# Patient Record
Sex: FEMALE | Race: WHITE | NOT HISPANIC OR LATINO
[De-identification: names, ages, dates, MRNs, and addresses within clinical notes are randomized per-mention and may not be internally consistent; named-entity substitution may affect disease eponyms.]

---

## 2020-09-20 ENCOUNTER — TRANSCRIPTION ENCOUNTER (OUTPATIENT)
Age: 60
End: 2020-09-20

## 2021-02-08 ENCOUNTER — APPOINTMENT (OUTPATIENT)
Dept: CT IMAGING | Facility: IMAGING CENTER | Age: 61
End: 2021-02-08
Payer: COMMERCIAL

## 2021-02-08 ENCOUNTER — RESULT REVIEW (OUTPATIENT)
Age: 61
End: 2021-02-08

## 2021-02-08 ENCOUNTER — OUTPATIENT (OUTPATIENT)
Dept: OUTPATIENT SERVICES | Facility: HOSPITAL | Age: 61
LOS: 1 days | End: 2021-02-08
Payer: COMMERCIAL

## 2021-02-08 DIAGNOSIS — G56.01 CARPAL TUNNEL SYNDROME, RIGHT UPPER LIMB: Chronic | ICD-10-CM

## 2021-02-08 DIAGNOSIS — Z00.8 ENCOUNTER FOR OTHER GENERAL EXAMINATION: ICD-10-CM

## 2021-02-08 DIAGNOSIS — Z98.89 OTHER SPECIFIED POSTPROCEDURAL STATES: Chronic | ICD-10-CM

## 2021-02-08 PROCEDURE — 74177 CT ABD & PELVIS W/CONTRAST: CPT | Mod: 26

## 2021-02-08 PROCEDURE — 82565 ASSAY OF CREATININE: CPT

## 2021-02-08 PROCEDURE — 74177 CT ABD & PELVIS W/CONTRAST: CPT

## 2021-04-16 ENCOUNTER — APPOINTMENT (OUTPATIENT)
Dept: RHEUMATOLOGY | Facility: CLINIC | Age: 61
End: 2021-04-16
Payer: COMMERCIAL

## 2021-04-16 ENCOUNTER — LABORATORY RESULT (OUTPATIENT)
Age: 61
End: 2021-04-16

## 2021-04-16 VITALS
SYSTOLIC BLOOD PRESSURE: 109 MMHG | HEIGHT: 66 IN | TEMPERATURE: 96.7 F | RESPIRATION RATE: 16 BRPM | WEIGHT: 139 LBS | OXYGEN SATURATION: 99 % | DIASTOLIC BLOOD PRESSURE: 70 MMHG | BODY MASS INDEX: 22.34 KG/M2 | HEART RATE: 60 BPM

## 2021-04-16 PROCEDURE — 99244 OFF/OP CNSLTJ NEW/EST MOD 40: CPT

## 2021-04-16 PROCEDURE — 99072 ADDL SUPL MATRL&STAF TM PHE: CPT

## 2021-04-21 ENCOUNTER — NON-APPOINTMENT (OUTPATIENT)
Age: 61
End: 2021-04-21

## 2021-04-22 ENCOUNTER — NON-APPOINTMENT (OUTPATIENT)
Age: 61
End: 2021-04-22

## 2021-04-27 ENCOUNTER — APPOINTMENT (OUTPATIENT)
Dept: RHEUMATOLOGY | Facility: CLINIC | Age: 61
End: 2021-04-27
Payer: COMMERCIAL

## 2021-04-27 VITALS
WEIGHT: 142 LBS | HEART RATE: 96 BPM | HEIGHT: 66 IN | BODY MASS INDEX: 22.82 KG/M2 | SYSTOLIC BLOOD PRESSURE: 157 MMHG | DIASTOLIC BLOOD PRESSURE: 70 MMHG | OXYGEN SATURATION: 98 % | TEMPERATURE: 97.9 F

## 2021-04-27 DIAGNOSIS — R76.12 NONSPECIFIC REACTION TO CELL MEDIATED IMMUNITY MEASUREMENT OF GAMMA INTERFERON ANTIGEN RESPONSE W/OUT ACTIVE TUBERCULOSIS: ICD-10-CM

## 2021-04-27 LAB
ALBUMIN SERPL ELPH-MCNC: 4.7 G/DL
ALP BLD-CCNC: 91 U/L
ALT SERPL-CCNC: 16 U/L
ANA SER IF-ACNC: NEGATIVE
ANION GAP SERPL CALC-SCNC: 15 MMOL/L
APPEARANCE: CLEAR
AST SERPL-CCNC: 12 U/L
B2 GLYCOPROT1 AB SER QL: NEGATIVE
BACTERIA: NEGATIVE
BASOPHILS # BLD AUTO: 0.04 K/UL
BASOPHILS NFR BLD AUTO: 0.3 %
BILIRUB SERPL-MCNC: 0.2 MG/DL
BILIRUBIN URINE: NEGATIVE
BLOOD URINE: NEGATIVE
BUN SERPL-MCNC: 13 MG/DL
C3 SERPL-MCNC: 172 MG/DL
C4 SERPL-MCNC: 42 MG/DL
CALCIUM SERPL-MCNC: 10.2 MG/DL
CARDIOLIPIN AB SER IA-ACNC: POSITIVE
CCP AB SER IA-ACNC: <8 UNITS
CHLORIDE SERPL-SCNC: 103 MMOL/L
CO2 SERPL-SCNC: 25 MMOL/L
COLOR: COLORLESS
CONFIRM: 29.8 SEC
CREAT SERPL-MCNC: 0.54 MG/DL
CREAT SPEC-SCNC: 25 MG/DL
CREAT/PROT UR: 0.3 RATIO
CRP SERPL-MCNC: 10 MG/L
DRVVT IMM 1:2 NP PPP: NORMAL
DRVVT SCREEN TO CONFIRM RATIO: 1.01 RATIO
DSDNA AB SER-ACNC: <12 IU/ML
ENA RNP AB SER IA-ACNC: <0.2 AL
ENA SM AB SER IA-ACNC: <0.2 AL
EOSINOPHIL # BLD AUTO: 0.05 K/UL
EOSINOPHIL NFR BLD AUTO: 0.3 %
ERYTHROCYTE [SEDIMENTATION RATE] IN BLOOD BY WESTERGREN METHOD: 36 MM/HR
GLUCOSE QUALITATIVE U: NEGATIVE
GLUCOSE SERPL-MCNC: 99 MG/DL
HBV CORE IGG+IGM SER QL: NONREACTIVE
HBV SURFACE AG SER QL: NONREACTIVE
HCT VFR BLD CALC: 41.3 %
HGB BLD-MCNC: 12.2 G/DL
HYALINE CASTS: 0 /LPF
IGG SUBSET TOTAL IGG: 746 MG/DL
IGG1 SER-MCNC: 531 MG/DL
IGG2 SER-MCNC: 121 MG/DL
IGG3 SER-MCNC: 50 MG/DL
IGG4 SER-MCNC: 4 MG/DL
IL6 SERPL-MCNC: 4.4 PG/ML
IMM GRANULOCYTES NFR BLD AUTO: 0.5 %
KETONES URINE: NEGATIVE
LEUKOCYTE ESTERASE URINE: NEGATIVE
LYMPHOCYTES # BLD AUTO: 1.76 K/UL
LYMPHOCYTES NFR BLD AUTO: 11.9 %
M TB IFN-G BLD-IMP: POSITIVE
MAN DIFF?: NORMAL
MCHC RBC-ENTMCNC: 27.2 PG
MCHC RBC-ENTMCNC: 29.5 GM/DL
MCV RBC AUTO: 92.2 FL
MICROSCOPIC-UA: NORMAL
MONOCYTES # BLD AUTO: 0.59 K/UL
MONOCYTES NFR BLD AUTO: 4 %
MPO AB + PR3 PNL SER: NORMAL
NEUTROPHILS # BLD AUTO: 12.29 K/UL
NEUTROPHILS NFR BLD AUTO: 83 %
NITRITE URINE: NEGATIVE
PH URINE: 6.5
PLATELET # BLD AUTO: 382 K/UL
POTASSIUM SERPL-SCNC: 4.9 MMOL/L
PROT SERPL-MCNC: 6.9 G/DL
PROT UR-MCNC: 7 MG/DL
PROTEIN URINE: NORMAL
QUANTIFERON TB PLUS MITOGEN MINUS NIL: 1.79 IU/ML
QUANTIFERON TB PLUS NIL: 0.01 IU/ML
QUANTIFERON TB PLUS TB1 MINUS NIL: 0.4 IU/ML
QUANTIFERON TB PLUS TB2 MINUS NIL: 0.46 IU/ML
RBC # BLD: 4.48 M/UL
RBC # FLD: 19 %
RED BLOOD CELLS URINE: 0 /HPF
RF+CCP IGG SER-IMP: NEGATIVE
RHEUMATOID FACT SER QL: 10 IU/ML
SCREEN DRVVT: 40.5 SEC
SILICA CLOTTING TIME INTERPRETATION: NORMAL
SILICA CLOTTING TIME S/C: 1.02 RATIO
SODIUM SERPL-SCNC: 143 MMOL/L
SPECIFIC GRAVITY URINE: 1.01
SQUAMOUS EPITHELIAL CELLS: 0 /HPF
T PALLIDUM AB SER QL IA: NEGATIVE
UROBILINOGEN URINE: NORMAL
WBC # FLD AUTO: 14.8 K/UL
WHITE BLOOD CELLS URINE: 0 /HPF

## 2021-04-27 PROCEDURE — 99072 ADDL SUPL MATRL&STAF TM PHE: CPT

## 2021-04-27 PROCEDURE — 99214 OFFICE O/P EST MOD 30 MIN: CPT

## 2021-04-28 ENCOUNTER — TRANSCRIPTION ENCOUNTER (OUTPATIENT)
Age: 61
End: 2021-04-28

## 2021-05-03 ENCOUNTER — APPOINTMENT (OUTPATIENT)
Dept: INFECTIOUS DISEASE | Facility: CLINIC | Age: 61
End: 2021-05-03
Payer: COMMERCIAL

## 2021-05-03 ENCOUNTER — LABORATORY RESULT (OUTPATIENT)
Age: 61
End: 2021-05-03

## 2021-05-03 ENCOUNTER — APPOINTMENT (OUTPATIENT)
Dept: RADIOLOGY | Facility: CLINIC | Age: 61
End: 2021-05-03
Payer: COMMERCIAL

## 2021-05-03 ENCOUNTER — OUTPATIENT (OUTPATIENT)
Dept: OUTPATIENT SERVICES | Facility: HOSPITAL | Age: 61
LOS: 1 days | End: 2021-05-03
Payer: COMMERCIAL

## 2021-05-03 VITALS
WEIGHT: 142 LBS | TEMPERATURE: 98.2 F | HEART RATE: 104 BPM | SYSTOLIC BLOOD PRESSURE: 156 MMHG | BODY MASS INDEX: 22.82 KG/M2 | DIASTOLIC BLOOD PRESSURE: 102 MMHG | OXYGEN SATURATION: 100 % | HEIGHT: 66 IN

## 2021-05-03 DIAGNOSIS — R76.12 NONSPECIFIC REACTION TO CELL MEDIATED IMMUNITY MEASUREMENT OF GAMMA INTERFERON ANTIGEN RESPONSE WITHOUT ACTIVE TUBERCULOSIS: ICD-10-CM

## 2021-05-03 DIAGNOSIS — Z98.89 OTHER SPECIFIED POSTPROCEDURAL STATES: Chronic | ICD-10-CM

## 2021-05-03 DIAGNOSIS — Z82.49 FAMILY HISTORY OF ISCHEMIC HEART DISEASE AND OTHER DISEASES OF THE CIRCULATORY SYSTEM: ICD-10-CM

## 2021-05-03 DIAGNOSIS — G56.01 CARPAL TUNNEL SYNDROME, RIGHT UPPER LIMB: Chronic | ICD-10-CM

## 2021-05-03 DIAGNOSIS — Z80.7 FAMILY HISTORY OF OTHER MALIGNANT NEOPLASMS OF LYMPHOID, HEMATOPOIETIC AND RELATED TISSUES: ICD-10-CM

## 2021-05-03 PROCEDURE — 99072 ADDL SUPL MATRL&STAF TM PHE: CPT

## 2021-05-03 PROCEDURE — 71046 X-RAY EXAM CHEST 2 VIEWS: CPT | Mod: 26

## 2021-05-03 PROCEDURE — 99203 OFFICE O/P NEW LOW 30 MIN: CPT

## 2021-05-03 PROCEDURE — 71046 X-RAY EXAM CHEST 2 VIEWS: CPT

## 2021-05-04 LAB
HCV AB SER QL: REACTIVE
HCV S/CO RATIO: 15.32 S/CO
HIV1+2 AB SPEC QL IA.RAPID: NONREACTIVE

## 2021-05-04 NOTE — PHYSICAL EXAM
[General Appearance - Alert] : alert [General Appearance - In No Acute Distress] : in no acute distress [Sclera] : the sclera and conjunctiva were normal [PERRL With Normal Accommodation] : pupils were equal in size, round, reactive to light [Extraocular Movements] : extraocular movements were intact [Outer Ear] : the ears and nose were normal in appearance [Oropharynx] : the oropharynx was normal with no thrush [Neck Appearance] : the appearance of the neck was normal [Neck Cervical Mass (___cm)] : no neck mass was observed [Jugular Venous Distention Increased] : there was no jugular-venous distention [] : no respiratory distress [Auscultation Breath Sounds / Voice Sounds] : lungs were clear to auscultation bilaterally [Heart Rate And Rhythm] : heart rate was normal and rhythm regular [Bowel Sounds] : normal bowel sounds [Abdomen Soft] : soft [Cervical Lymph Nodes Enlarged Posterior Bilaterally] : posterior cervical [Cervical Lymph Nodes Enlarged Anterior Bilaterally] : anterior cervical [Supraclavicular Lymph Nodes Enlarged Bilaterally] : supraclavicular [Skin Lesions] : no skin lesions [No Focal Deficits] : no focal deficits [Oriented To Time, Place, And Person] : oriented to person, place, and time [Affect] : the affect was normal

## 2021-05-04 NOTE — ASSESSMENT
[FreeTextEntry1] : This is a 59 yo F with h/o aortitis, MGUS, prior Hep C (s/p treatment >6 years ago ~2015 and cured), presents today for positive quantiferon gold.\par \par Check chest xray.\par If negative then it is c/w latent TB and  will start  mg po daily and B6 mg po daily.\par \par Check HIV, hepatitis C since h/o Hep c in past. \par LFTs normal. \par \par Discussed side effects of INH with pt including but not limited to GI upset, neuropathy, elevated LFTs, bone marrow suppression.  She does not drink alcohol.\par \par Pt to return one month.\par \par Addendum:\par Chest xray clear.\par Pt informed to start INH as above.

## 2021-05-04 NOTE — HISTORY OF PRESENT ILLNESS
[FreeTextEntry1] : This is a 59 yo F with h/o aortitis, MGUS, prior Hep C (s/p treatment >6 years ago ~2015 and cured), presents today for positive quantiferon gold.\par \par Born in [place].\par Moved to  1999.\par Never knew of a positive TB test in the past.\par Never treated in the past.\par No known exposures.\par Works as .\par Lives in [place]\par \par \par No cough, sob, enlarged lymph nodes, fevers. Does get some sweats since post menopausal. No hemoptysis.\par \par Had chest xray 1/28/21 which was clear. \par \par Also notes h/o Hep C, s/p treatment ~2015, LFTs normal now. \par \par Now on Prednisone 60 mg daily and to be tapered down soon to 50.  To start biologic once TB treatment initiated.

## 2021-05-04 NOTE — CONSULT LETTER
[Dear  ___] : Dear  [unfilled], [Consult Letter:] : I had the pleasure of evaluating your patient, [unfilled]. [Please see my note below.] : Please see my note below. [Consult Closing:] : Thank you very much for allowing me to participate in the care of this patient.  If you have any questions, please do not hesitate to contact me. [Sincerely,] : Sincerely, [FreeTextEntry2] : Dr. Neva Patino [FreeTextEntry3] : \par Valery Thibodeaux MD\par  of Medicine\par Division of Infectious Diseases\par [facility]\par [address] [place] [address] DrJyoti\[place] [address]\par Tel: [phone]\par Fax: [phone]

## 2021-05-05 LAB
HCV RNA SERPL NAA DL=5-ACNC: NOT DETECTED IU/ML
HCV RNA SERPL NAA+PROBE-LOG IU: NOT DETECTED LOG10IU/ML

## 2021-05-10 ENCOUNTER — APPOINTMENT (OUTPATIENT)
Dept: CT IMAGING | Facility: CLINIC | Age: 61
End: 2021-05-10
Payer: COMMERCIAL

## 2021-05-10 ENCOUNTER — OUTPATIENT (OUTPATIENT)
Dept: OUTPATIENT SERVICES | Facility: HOSPITAL | Age: 61
LOS: 1 days | End: 2021-05-10
Payer: COMMERCIAL

## 2021-05-10 DIAGNOSIS — Z98.89 OTHER SPECIFIED POSTPROCEDURAL STATES: Chronic | ICD-10-CM

## 2021-05-10 DIAGNOSIS — G56.01 CARPAL TUNNEL SYNDROME, RIGHT UPPER LIMB: Chronic | ICD-10-CM

## 2021-05-10 DIAGNOSIS — Z00.8 ENCOUNTER FOR OTHER GENERAL EXAMINATION: ICD-10-CM

## 2021-05-10 DIAGNOSIS — I77.6 ARTERITIS, UNSPECIFIED: ICD-10-CM

## 2021-05-10 PROCEDURE — 71275 CT ANGIOGRAPHY CHEST: CPT | Mod: 26

## 2021-05-10 PROCEDURE — 70498 CT ANGIOGRAPHY NECK: CPT | Mod: 26

## 2021-05-10 PROCEDURE — 70498 CT ANGIOGRAPHY NECK: CPT

## 2021-05-10 PROCEDURE — 71275 CT ANGIOGRAPHY CHEST: CPT

## 2021-05-11 ENCOUNTER — NON-APPOINTMENT (OUTPATIENT)
Age: 61
End: 2021-05-11

## 2021-05-27 ENCOUNTER — APPOINTMENT (OUTPATIENT)
Dept: RHEUMATOLOGY | Facility: CLINIC | Age: 61
End: 2021-05-27
Payer: COMMERCIAL

## 2021-05-27 VITALS
BODY MASS INDEX: 23.3 KG/M2 | OXYGEN SATURATION: 98 % | SYSTOLIC BLOOD PRESSURE: 186 MMHG | HEIGHT: 66 IN | TEMPERATURE: 97.2 F | WEIGHT: 145 LBS | DIASTOLIC BLOOD PRESSURE: 102 MMHG | HEART RATE: 115 BPM

## 2021-05-27 DIAGNOSIS — Z22.7 LATENT TUBERCULOSIS: ICD-10-CM

## 2021-05-27 PROCEDURE — 99072 ADDL SUPL MATRL&STAF TM PHE: CPT

## 2021-05-27 PROCEDURE — 99214 OFFICE O/P EST MOD 30 MIN: CPT

## 2021-05-28 ENCOUNTER — TRANSCRIPTION ENCOUNTER (OUTPATIENT)
Age: 61
End: 2021-05-28

## 2021-05-28 LAB
ALBUMIN SERPL ELPH-MCNC: 4.8 G/DL
ALP BLD-CCNC: 73 U/L
ALT SERPL-CCNC: 22 U/L
ANION GAP SERPL CALC-SCNC: 15 MMOL/L
AST SERPL-CCNC: 16 U/L
BASOPHILS # BLD AUTO: 0.02 K/UL
BASOPHILS NFR BLD AUTO: 0.2 %
BILIRUB SERPL-MCNC: 0.3 MG/DL
BUN SERPL-MCNC: 12 MG/DL
CALCIUM SERPL-MCNC: 9.8 MG/DL
CHLORIDE SERPL-SCNC: 102 MMOL/L
CO2 SERPL-SCNC: 26 MMOL/L
CREAT SERPL-MCNC: 0.58 MG/DL
CRP SERPL-MCNC: 29 MG/L
EOSINOPHIL # BLD AUTO: 0.01 K/UL
EOSINOPHIL NFR BLD AUTO: 0.1 %
ERYTHROCYTE [SEDIMENTATION RATE] IN BLOOD BY WESTERGREN METHOD: 27 MM/HR
GLUCOSE SERPL-MCNC: 138 MG/DL
HCT VFR BLD CALC: 42.4 %
HGB BLD-MCNC: 13.1 G/DL
IMM GRANULOCYTES NFR BLD AUTO: 0.4 %
LYMPHOCYTES # BLD AUTO: 0.67 K/UL
LYMPHOCYTES NFR BLD AUTO: 6.4 %
MAN DIFF?: NORMAL
MCHC RBC-ENTMCNC: 29.3 PG
MCHC RBC-ENTMCNC: 30.9 GM/DL
MCV RBC AUTO: 94.9 FL
MONOCYTES # BLD AUTO: 0.15 K/UL
MONOCYTES NFR BLD AUTO: 1.4 %
NEUTROPHILS # BLD AUTO: 9.57 K/UL
NEUTROPHILS NFR BLD AUTO: 91.5 %
PLATELET # BLD AUTO: 334 K/UL
POTASSIUM SERPL-SCNC: 4.7 MMOL/L
PROT SERPL-MCNC: 7 G/DL
RBC # BLD: 4.47 M/UL
RBC # FLD: 17.5 %
SODIUM SERPL-SCNC: 143 MMOL/L
WBC # FLD AUTO: 10.46 K/UL

## 2021-06-08 ENCOUNTER — APPOINTMENT (OUTPATIENT)
Dept: INFECTIOUS DISEASE | Facility: CLINIC | Age: 61
End: 2021-06-08
Payer: COMMERCIAL

## 2021-06-08 VITALS
HEART RATE: 91 BPM | OXYGEN SATURATION: 97 % | WEIGHT: 144 LBS | RESPIRATION RATE: 17 BRPM | SYSTOLIC BLOOD PRESSURE: 135 MMHG | BODY MASS INDEX: 23.14 KG/M2 | DIASTOLIC BLOOD PRESSURE: 85 MMHG | TEMPERATURE: 99.1 F | HEIGHT: 66 IN

## 2021-06-08 PROCEDURE — 99072 ADDL SUPL MATRL&STAF TM PHE: CPT

## 2021-06-08 PROCEDURE — 99213 OFFICE O/P EST LOW 20 MIN: CPT

## 2021-06-11 LAB
ALBUMIN SERPL ELPH-MCNC: 4.4 G/DL
ALP BLD-CCNC: 62 U/L
ALT SERPL-CCNC: 33 U/L
ANION GAP SERPL CALC-SCNC: 15 MMOL/L
AST SERPL-CCNC: 18 U/L
BILIRUB SERPL-MCNC: 0.3 MG/DL
BUN SERPL-MCNC: 11 MG/DL
CALCIUM SERPL-MCNC: 9.3 MG/DL
CHLORIDE SERPL-SCNC: 102 MMOL/L
CO2 SERPL-SCNC: 23 MMOL/L
CREAT SERPL-MCNC: 0.6 MG/DL
GLUCOSE SERPL-MCNC: 138 MG/DL
POTASSIUM SERPL-SCNC: 4.8 MMOL/L
PROT SERPL-MCNC: 6.5 G/DL
SODIUM SERPL-SCNC: 139 MMOL/L

## 2021-06-14 NOTE — ASSESSMENT
[FreeTextEntry1] : This is a 61 yo F with h/o aortitis, MGUS, prior Hep C (s/p treatment >6 years ago ~2015 and cured), presents today for positive quantiferon gold found to have Latent TB\par \par Chest xray was clear 5/4/21 as was CT angio 5/20/21. No findings of TB.\par \par Continue  mg po daily and B6 mg po daily.  Planned for 9 months.  End date 2/5/2022.\par \par LFTs normal.\par \par Discussed side effects of INH with pt including but not limited to GI upset, neuropathy, elevated LFTs, bone marrow suppression.  She does not drink alcohol.\par \par If needed, can start biologic as pt has been on INH for one month now. \par \par Pt to return one month.\par \par

## 2021-06-14 NOTE — PHYSICAL EXAM
[General Appearance - Alert] : alert [General Appearance - In No Acute Distress] : in no acute distress [PERRL With Normal Accommodation] : pupils were equal in size, round, reactive to light [Sclera] : the sclera and conjunctiva were normal [Extraocular Movements] : extraocular movements were intact [Outer Ear] : the ears and nose were normal in appearance [Neck Appearance] : the appearance of the neck was normal [Oropharynx] : the oropharynx was normal with no thrush [Neck Cervical Mass (___cm)] : no neck mass was observed [Jugular Venous Distention Increased] : there was no jugular-venous distention [Auscultation Breath Sounds / Voice Sounds] : lungs were clear to auscultation bilaterally [] : no respiratory distress [Heart Rate And Rhythm] : heart rate was normal and rhythm regular [Bowel Sounds] : normal bowel sounds [Cervical Lymph Nodes Enlarged Posterior Bilaterally] : posterior cervical [Abdomen Soft] : soft [Cervical Lymph Nodes Enlarged Anterior Bilaterally] : anterior cervical [Supraclavicular Lymph Nodes Enlarged Bilaterally] : supraclavicular [Skin Lesions] : no skin lesions [No Focal Deficits] : no focal deficits [Oriented To Time, Place, And Person] : oriented to person, place, and time [Affect] : the affect was normal

## 2021-06-14 NOTE — HISTORY OF PRESENT ILLNESS
[FreeTextEntry1] : This is a 59 yo F with h/o aortitis, MGUS, prior Hep C (s/p treatment >6 years ago ~2015 and cured), presents today for positive quantiferon gold.\par \par Born in [place].\par Moved to  1999.\par Never knew of a positive TB test in the past.\par Never treated in the past.\par No known exposures.\par Works as .\par Lives in [place]\par \par \par No cough, sob, enlarged lymph nodes, fevers. Does get some sweats since post menopausal. No hemoptysis.\par \par Had chest xray 1/28/21 which was clear. \par \par Also notes h/o Hep C, s/p treatment ~2015, LFTs normal now. \par \par Now on Prednisone 60 mg daily and to be tapered down soon to 50.  To start biologic once TB treatment initiated.   \par \par Started INH and B6 5/2021.  Tolerating. Does have some have some muscle cramps. Told by her cardiologist to take magnesium.

## 2021-06-14 NOTE — CONSULT LETTER
[Dear  ___] : Dear  [unfilled], [Consult Letter:] : I had the pleasure of evaluating your patient, [unfilled]. [Consult Closing:] : Thank you very much for allowing me to participate in the care of this patient.  If you have any questions, please do not hesitate to contact me. [Please see my note below.] : Please see my note below. [Sincerely,] : Sincerely, [FreeTextEntry2] : Dr. Neva Patino [FreeTextEntry3] : \par Valery Thibodeaux MD\par  of Medicine\par Division of Infectious Diseases\par [facility] at [facility]\par [address] [place] [address] DrJyoti\[place] [address]\par Tel: [phone]\par Fax: [phone]

## 2021-07-01 ENCOUNTER — APPOINTMENT (OUTPATIENT)
Dept: RHEUMATOLOGY | Facility: CLINIC | Age: 61
End: 2021-07-01
Payer: COMMERCIAL

## 2021-07-01 VITALS
HEIGHT: 66 IN | BODY MASS INDEX: 23.14 KG/M2 | SYSTOLIC BLOOD PRESSURE: 122 MMHG | OXYGEN SATURATION: 98 % | DIASTOLIC BLOOD PRESSURE: 78 MMHG | WEIGHT: 144 LBS | HEART RATE: 88 BPM | TEMPERATURE: 97.8 F | RESPIRATION RATE: 16 BRPM

## 2021-07-01 PROCEDURE — 99214 OFFICE O/P EST MOD 30 MIN: CPT

## 2021-07-01 PROCEDURE — 99072 ADDL SUPL MATRL&STAF TM PHE: CPT

## 2021-07-02 ENCOUNTER — TRANSCRIPTION ENCOUNTER (OUTPATIENT)
Age: 61
End: 2021-07-02

## 2021-07-02 LAB
BASOPHILS # BLD AUTO: 0.03 K/UL
BASOPHILS NFR BLD AUTO: 0.3 %
CRP SERPL-MCNC: 8 MG/L
EOSINOPHIL # BLD AUTO: 0.01 K/UL
EOSINOPHIL NFR BLD AUTO: 0.1 %
ERYTHROCYTE [SEDIMENTATION RATE] IN BLOOD BY WESTERGREN METHOD: 19 MM/HR
HCT VFR BLD CALC: 42.8 %
HGB BLD-MCNC: 13 G/DL
IMM GRANULOCYTES NFR BLD AUTO: 0.5 %
LYMPHOCYTES # BLD AUTO: 0.85 K/UL
LYMPHOCYTES NFR BLD AUTO: 7.3 %
MAN DIFF?: NORMAL
MCHC RBC-ENTMCNC: 29.7 PG
MCHC RBC-ENTMCNC: 30.4 GM/DL
MCV RBC AUTO: 97.9 FL
MONOCYTES # BLD AUTO: 0.27 K/UL
MONOCYTES NFR BLD AUTO: 2.3 %
NEUTROPHILS # BLD AUTO: 10.48 K/UL
NEUTROPHILS NFR BLD AUTO: 89.5 %
PLATELET # BLD AUTO: 318 K/UL
RBC # BLD: 4.37 M/UL
RBC # FLD: 15.2 %
WBC # FLD AUTO: 11.7 K/UL

## 2021-07-16 ENCOUNTER — NON-APPOINTMENT (OUTPATIENT)
Age: 61
End: 2021-07-16

## 2021-07-16 ENCOUNTER — APPOINTMENT (OUTPATIENT)
Dept: INFECTIOUS DISEASE | Facility: CLINIC | Age: 61
End: 2021-07-16
Payer: COMMERCIAL

## 2021-07-16 VITALS
OXYGEN SATURATION: 100 % | TEMPERATURE: 98.5 F | HEIGHT: 66 IN | BODY MASS INDEX: 23.14 KG/M2 | WEIGHT: 144 LBS | HEART RATE: 124 BPM | SYSTOLIC BLOOD PRESSURE: 156 MMHG | DIASTOLIC BLOOD PRESSURE: 106 MMHG

## 2021-07-16 PROCEDURE — 99213 OFFICE O/P EST LOW 20 MIN: CPT

## 2021-07-16 PROCEDURE — 99072 ADDL SUPL MATRL&STAF TM PHE: CPT

## 2021-07-16 NOTE — CONSULT LETTER
[Dear  ___] : Dear  [unfilled], [Consult Letter:] : I had the pleasure of evaluating your patient, [unfilled]. [Please see my note below.] : Please see my note below. [Consult Closing:] : Thank you very much for allowing me to participate in the care of this patient.  If you have any questions, please do not hesitate to contact me. [Sincerely,] : Sincerely, [FreeTextEntry2] : Dr. Neva Patino [FreeTextEntry3] : \par Valery Thibodeaux MD\par  of Medicine\par Division of Infectious Diseases\par [facility] at [facility]\par [address] [place] [address] DrJyoti\[place] [address]\par Tel: [phone]\par Fax: [phone]

## 2021-07-16 NOTE — ASSESSMENT
[FreeTextEntry1] : This is a 60 yo F with h/o aortitis, MGUS, prior Hep C (s/p treatment >6 years ago ~2015 and cured), presents today for positive quantiferon gold found to have Latent TB\par \par Chest xray was clear 5/4/21 as was CT angio 5/20/21. No findings of TB.\par \par Continue  mg po daily and B6 mg po daily.  Planned for 9 months.  End date 2/5/2022.\par \par LFTs normal.\par \par Discussed side effects of INH with pt including but not limited to GI upset, neuropathy, elevated LFTs, bone marrow suppression.  She does not drink alcohol.\par \par If needed, can start biologic as pt has been on INH for one month now. \par \par Concerned regarding leg cramps.  Will add CPK to labs today with CMP. CBC checked 2 weeks ago and stable.  WBC mildly high but pt on prednisone.   \par \par Asked pt to hold INH x 2 days. Continue B6 though.  Will see if cramps any better with holding 2 doses of INH.\par Resume INH Sunday 7/18/21.\par \par RTC 6-8 weeks but sooner if needed. \par \par

## 2021-07-19 LAB
ALBUMIN SERPL ELPH-MCNC: 4.4 G/DL
ALP BLD-CCNC: 73 U/L
ALT SERPL-CCNC: 114 U/L
ANION GAP SERPL CALC-SCNC: 15 MMOL/L
AST SERPL-CCNC: 56 U/L
BILIRUB SERPL-MCNC: 0.2 MG/DL
BUN SERPL-MCNC: 15 MG/DL
CALCIUM SERPL-MCNC: 9.5 MG/DL
CHLORIDE SERPL-SCNC: 104 MMOL/L
CK SERPL-CCNC: 134 U/L
CO2 SERPL-SCNC: 24 MMOL/L
CREAT SERPL-MCNC: 0.67 MG/DL
CRP SERPL-MCNC: 8 MG/L
ERYTHROCYTE [SEDIMENTATION RATE] IN BLOOD BY WESTERGREN METHOD: 9 MM/HR
GLUCOSE SERPL-MCNC: 117 MG/DL
POTASSIUM SERPL-SCNC: 4.6 MMOL/L
PROT SERPL-MCNC: 6.3 G/DL
SODIUM SERPL-SCNC: 143 MMOL/L

## 2021-07-20 ENCOUNTER — TRANSCRIPTION ENCOUNTER (OUTPATIENT)
Age: 61
End: 2021-07-20

## 2021-07-21 ENCOUNTER — TRANSCRIPTION ENCOUNTER (OUTPATIENT)
Age: 61
End: 2021-07-21

## 2021-07-21 ENCOUNTER — APPOINTMENT (OUTPATIENT)
Dept: INFECTIOUS DISEASE | Facility: CLINIC | Age: 61
End: 2021-07-21

## 2021-07-21 ENCOUNTER — NON-APPOINTMENT (OUTPATIENT)
Age: 61
End: 2021-07-21

## 2021-07-21 LAB
ALBUMIN SERPL ELPH-MCNC: 4.5 G/DL
ALP BLD-CCNC: 71 U/L
ALT SERPL-CCNC: 83 U/L
ANION GAP SERPL CALC-SCNC: 16 MMOL/L
AST SERPL-CCNC: 35 U/L
BILIRUB SERPL-MCNC: 0.4 MG/DL
BUN SERPL-MCNC: 16 MG/DL
CALCIUM SERPL-MCNC: 9.9 MG/DL
CHLORIDE SERPL-SCNC: 100 MMOL/L
CO2 SERPL-SCNC: 24 MMOL/L
CREAT SERPL-MCNC: 0.78 MG/DL
GLUCOSE SERPL-MCNC: 106 MG/DL
POTASSIUM SERPL-SCNC: 4.6 MMOL/L
PROT SERPL-MCNC: 6.2 G/DL
SODIUM SERPL-SCNC: 140 MMOL/L

## 2021-07-22 ENCOUNTER — TRANSCRIPTION ENCOUNTER (OUTPATIENT)
Age: 61
End: 2021-07-22

## 2021-07-28 ENCOUNTER — APPOINTMENT (OUTPATIENT)
Dept: RHEUMATOLOGY | Facility: CLINIC | Age: 61
End: 2021-07-28
Payer: COMMERCIAL

## 2021-07-28 VITALS
TEMPERATURE: 97.2 F | HEART RATE: 103 BPM | BODY MASS INDEX: 23.33 KG/M2 | OXYGEN SATURATION: 98 % | DIASTOLIC BLOOD PRESSURE: 79 MMHG | RESPIRATION RATE: 16 BRPM | HEIGHT: 66 IN | WEIGHT: 145.2 LBS | SYSTOLIC BLOOD PRESSURE: 129 MMHG

## 2021-07-28 DIAGNOSIS — R06.02 SHORTNESS OF BREATH: ICD-10-CM

## 2021-07-28 DIAGNOSIS — K21.9 GASTRO-ESOPHAGEAL REFLUX DISEASE W/OUT ESOPHAGITIS: ICD-10-CM

## 2021-07-28 PROCEDURE — 99072 ADDL SUPL MATRL&STAF TM PHE: CPT

## 2021-07-28 PROCEDURE — 99215 OFFICE O/P EST HI 40 MIN: CPT

## 2021-08-20 ENCOUNTER — APPOINTMENT (OUTPATIENT)
Dept: MRI IMAGING | Facility: CLINIC | Age: 61
End: 2021-08-20
Payer: COMMERCIAL

## 2021-08-20 ENCOUNTER — OUTPATIENT (OUTPATIENT)
Dept: OUTPATIENT SERVICES | Facility: HOSPITAL | Age: 61
LOS: 1 days | End: 2021-08-20
Payer: COMMERCIAL

## 2021-08-20 ENCOUNTER — APPOINTMENT (OUTPATIENT)
Dept: RADIOLOGY | Facility: CLINIC | Age: 61
End: 2021-08-20
Payer: COMMERCIAL

## 2021-08-20 DIAGNOSIS — Z98.89 OTHER SPECIFIED POSTPROCEDURAL STATES: Chronic | ICD-10-CM

## 2021-08-20 DIAGNOSIS — Z00.8 ENCOUNTER FOR OTHER GENERAL EXAMINATION: ICD-10-CM

## 2021-08-20 DIAGNOSIS — G56.01 CARPAL TUNNEL SYNDROME, RIGHT UPPER LIMB: Chronic | ICD-10-CM

## 2021-08-20 PROCEDURE — 74185 MRA ABD W OR W/O CNTRST: CPT | Mod: 26

## 2021-08-20 PROCEDURE — 77085 DXA BONE DENSITY AXL VRT FX: CPT

## 2021-08-20 PROCEDURE — 71555 MRI ANGIO CHEST W OR W/O DYE: CPT | Mod: 26

## 2021-08-20 PROCEDURE — C8911: CPT

## 2021-08-20 PROCEDURE — 77085 DXA BONE DENSITY AXL VRT FX: CPT | Mod: 26

## 2021-08-20 PROCEDURE — C8902: CPT

## 2021-08-20 PROCEDURE — A9585: CPT

## 2021-08-26 ENCOUNTER — APPOINTMENT (OUTPATIENT)
Dept: MRI IMAGING | Facility: CLINIC | Age: 61
End: 2021-08-26

## 2021-08-27 ENCOUNTER — APPOINTMENT (OUTPATIENT)
Dept: PULMONOLOGY | Facility: CLINIC | Age: 61
End: 2021-08-27

## 2021-08-27 ENCOUNTER — RESULT REVIEW (OUTPATIENT)
Age: 61
End: 2021-08-27

## 2021-08-27 ENCOUNTER — APPOINTMENT (OUTPATIENT)
Dept: RADIOLOGY | Facility: CLINIC | Age: 61
End: 2021-08-27
Payer: COMMERCIAL

## 2021-08-27 ENCOUNTER — APPOINTMENT (OUTPATIENT)
Dept: RHEUMATOLOGY | Facility: CLINIC | Age: 61
End: 2021-08-27
Payer: COMMERCIAL

## 2021-08-27 VITALS
RESPIRATION RATE: 16 BRPM | WEIGHT: 149 LBS | HEIGHT: 66 IN | TEMPERATURE: 96.3 F | OXYGEN SATURATION: 98 % | HEART RATE: 102 BPM | BODY MASS INDEX: 23.95 KG/M2

## 2021-08-27 DIAGNOSIS — Z79.52 LONG TERM (CURRENT) USE OF SYSTEMIC STEROIDS: ICD-10-CM

## 2021-08-27 DIAGNOSIS — M25.572 PAIN IN LEFT ANKLE AND JOINTS OF LEFT FOOT: ICD-10-CM

## 2021-08-27 DIAGNOSIS — M25.571 PAIN IN RIGHT ANKLE AND JOINTS OF RIGHT FOOT: ICD-10-CM

## 2021-08-27 DIAGNOSIS — Z11.59 ENCOUNTER FOR SCREENING FOR OTHER VIRAL DISEASES: ICD-10-CM

## 2021-08-27 PROCEDURE — 73610 X-RAY EXAM OF ANKLE: CPT | Mod: RT

## 2021-08-27 PROCEDURE — 99214 OFFICE O/P EST MOD 30 MIN: CPT

## 2021-08-27 RX ORDER — ISONIAZID 300 MG/1
300 TABLET ORAL
Qty: 90 | Refills: 2 | Status: DISCONTINUED | COMMUNITY
Start: 2021-05-03 | End: 2021-08-27

## 2021-08-31 ENCOUNTER — NON-APPOINTMENT (OUTPATIENT)
Age: 61
End: 2021-08-31

## 2021-08-31 LAB
ALBUMIN SERPL ELPH-MCNC: 4.4 G/DL
ALP BLD-CCNC: 57 U/L
ALT SERPL-CCNC: 18 U/L
ANION GAP SERPL CALC-SCNC: 14 MMOL/L
AST SERPL-CCNC: 19 U/L
BASOPHILS # BLD AUTO: 0.02 K/UL
BASOPHILS NFR BLD AUTO: 0.2 %
BILIRUB SERPL-MCNC: 0.3 MG/DL
BUN SERPL-MCNC: 9 MG/DL
CALCIUM SERPL-MCNC: 9.7 MG/DL
CHLORIDE SERPL-SCNC: 105 MMOL/L
CK SERPL-CCNC: 38 U/L
CO2 SERPL-SCNC: 26 MMOL/L
COVID-19 SPIKE DOMAIN ANTIBODY INTERPRETATION: POSITIVE
CREAT SERPL-MCNC: 0.64 MG/DL
CRP SERPL-MCNC: <3 MG/L
EOSINOPHIL # BLD AUTO: 0 K/UL
EOSINOPHIL NFR BLD AUTO: 0 %
ERYTHROCYTE [SEDIMENTATION RATE] IN BLOOD BY WESTERGREN METHOD: 7 MM/HR
GGT SERPL-CCNC: 25 U/L
GLUCOSE SERPL-MCNC: 98 MG/DL
HCT VFR BLD CALC: 42.1 %
HGB BLD-MCNC: 13.5 G/DL
IMM GRANULOCYTES NFR BLD AUTO: 0.3 %
LYMPHOCYTES # BLD AUTO: 1.6 K/UL
LYMPHOCYTES NFR BLD AUTO: 15.2 %
MAN DIFF?: NORMAL
MCHC RBC-ENTMCNC: 30.5 PG
MCHC RBC-ENTMCNC: 32.1 GM/DL
MCV RBC AUTO: 95.2 FL
MONOCYTES # BLD AUTO: 0.56 K/UL
MONOCYTES NFR BLD AUTO: 5.3 %
NEUTROPHILS # BLD AUTO: 8.29 K/UL
NEUTROPHILS NFR BLD AUTO: 79 %
PLATELET # BLD AUTO: 274 K/UL
POTASSIUM SERPL-SCNC: 4.6 MMOL/L
PROT SERPL-MCNC: 6.5 G/DL
RBC # BLD: 4.42 M/UL
RBC # FLD: 13.2 %
SARS-COV-2 AB SERPL IA-ACNC: 142 U/ML
SODIUM SERPL-SCNC: 145 MMOL/L
WBC # FLD AUTO: 10.5 K/UL

## 2021-09-01 ENCOUNTER — APPOINTMENT (OUTPATIENT)
Dept: INFECTIOUS DISEASE | Facility: CLINIC | Age: 61
End: 2021-09-01
Payer: COMMERCIAL

## 2021-09-01 ENCOUNTER — OUTPATIENT (OUTPATIENT)
Dept: OUTPATIENT SERVICES | Facility: HOSPITAL | Age: 61
LOS: 1 days | End: 2021-09-01
Payer: COMMERCIAL

## 2021-09-01 ENCOUNTER — APPOINTMENT (OUTPATIENT)
Dept: MRI IMAGING | Facility: CLINIC | Age: 61
End: 2021-09-01
Payer: COMMERCIAL

## 2021-09-01 VITALS
DIASTOLIC BLOOD PRESSURE: 75 MMHG | HEIGHT: 66 IN | BODY MASS INDEX: 24.27 KG/M2 | HEART RATE: 80 BPM | RESPIRATION RATE: 18 BRPM | WEIGHT: 151 LBS | OXYGEN SATURATION: 99 % | SYSTOLIC BLOOD PRESSURE: 136 MMHG | TEMPERATURE: 98.7 F

## 2021-09-01 DIAGNOSIS — G56.01 CARPAL TUNNEL SYNDROME, RIGHT UPPER LIMB: Chronic | ICD-10-CM

## 2021-09-01 DIAGNOSIS — Z98.89 OTHER SPECIFIED POSTPROCEDURAL STATES: Chronic | ICD-10-CM

## 2021-09-01 DIAGNOSIS — M19.079 PRIMARY OSTEOARTHRITIS, UNSPECIFIED ANKLE AND FOOT: ICD-10-CM

## 2021-09-01 PROCEDURE — 73721 MRI JNT OF LWR EXTRE W/O DYE: CPT

## 2021-09-01 PROCEDURE — 73721 MRI JNT OF LWR EXTRE W/O DYE: CPT | Mod: 26,RT

## 2021-09-01 PROCEDURE — 99214 OFFICE O/P EST MOD 30 MIN: CPT

## 2021-09-03 NOTE — CONSULT LETTER
[Dear  ___] : Dear  [unfilled], [Consult Letter:] : I had the pleasure of evaluating your patient, [unfilled]. [Please see my note below.] : Please see my note below. [Consult Closing:] : Thank you very much for allowing me to participate in the care of this patient.  If you have any questions, please do not hesitate to contact me. [Sincerely,] : Sincerely, [FreeTextEntry2] : Dr. Neva Patino [FreeTextEntry3] : \par Valery Thibodeaux MD\par  of Medicine\par Division of Infectious Diseases\par [facility] at [facility]\par [address] [place] [address] DrJyoti\[place] [address]\par Tel: [phone]\par Fax: [phone]

## 2021-09-03 NOTE — HISTORY OF PRESENT ILLNESS
[FreeTextEntry1] : This is a 60 yo F with h/o aortitis, MGUS, prior Hep C (s/p treatment >6 years ago ~2015 and cured), presents today for f/up of latent TB with positive quantiferon gold.\par \par Born in [place].\par Moved to [place].\par Never knew of a positive TB test in the past.\par Never treated in the past.\par No known exposures.\par Works as .\par Lives in [place]\par \par \par No cough, sob, enlarged lymph nodes, fevers. Does get some sweats since post menopausal. No hemoptysis.\par \par Had chest xray 1/28/21 which was clear. \par \par Also notes h/o Hep C, s/p treatment ~2015, LFTs normal now. \par \par Now on Prednisone taper.  Started On 30 mg for aortitis.  Now at 15 mg po daily.    \par \par Started INH and B6 5/3/2021.  Developed muscle aches and elevated LFTs.  Drug stopped 7/16/21.  Returns to discuss next steps.    \par \par MRI chest done 8/20/21: Patent central airways.  No nodules. No effusion. \par Given omeprazole for GERD.\par

## 2021-09-03 NOTE — ASSESSMENT
[FreeTextEntry1] : This is a 62 yo F with h/o aortitis, MGUS, prior Hep C (s/p treatment >6 years ago ~2015 and cured), presents today for positive quantiferon gold found to have Latent TB\par \par Chest xray was clear 5/4/21 as was CT angio 5/20/21. No findings of TB.\par Had MRI chest 8/20/21 w/o findings of TB as well.\par \par Had to stop INH due to transaminitis and muscle aches.\par \par Will start Rifampin 600 mg po daily x 4 months today.\par Can interact with omeprazole and atorvastatin. Will monitor closely. \par \par LFTs normal now.\par \par Discussed side effects of Rifampin with pt including but not limited to GI upset, elevated LFTs, bone marrow suppression, orange colored urine.  She does not drink alcohol.\par \par RTC 2-3 weeks for close f/up given side effects with prior INH. \par \par \par \par

## 2021-09-07 ENCOUNTER — TRANSCRIPTION ENCOUNTER (OUTPATIENT)
Age: 61
End: 2021-09-07

## 2021-09-08 ENCOUNTER — INPATIENT (INPATIENT)
Facility: HOSPITAL | Age: 61
LOS: 1 days | Discharge: HOME | DRG: 547 | End: 2021-09-10
Attending: GENERAL ACUTE CARE HOSPITAL | Admitting: GENERAL ACUTE CARE HOSPITAL
Payer: COMMERCIAL

## 2021-09-08 ENCOUNTER — APPOINTMENT (OUTPATIENT)
Dept: RHEUMATOLOGY | Facility: CLINIC | Age: 61
End: 2021-09-08
Payer: COMMERCIAL

## 2021-09-08 VITALS
SYSTOLIC BLOOD PRESSURE: 176 MMHG | DIASTOLIC BLOOD PRESSURE: 104 MMHG | HEIGHT: 66 IN | HEART RATE: 128 BPM | OXYGEN SATURATION: 97 % | BODY MASS INDEX: 23.78 KG/M2 | WEIGHT: 148 LBS | TEMPERATURE: 97.4 F

## 2021-09-08 VITALS
OXYGEN SATURATION: 98 % | HEART RATE: 93 BPM | DIASTOLIC BLOOD PRESSURE: 70 MMHG | TEMPERATURE: 98 F | RESPIRATION RATE: 18 BRPM | WEIGHT: 149.91 LBS | HEIGHT: 66 IN | SYSTOLIC BLOOD PRESSURE: 112 MMHG

## 2021-09-08 DIAGNOSIS — G56.01 CARPAL TUNNEL SYNDROME, RIGHT UPPER LIMB: Chronic | ICD-10-CM

## 2021-09-08 DIAGNOSIS — R07.9 CHEST PAIN, UNSPECIFIED: ICD-10-CM

## 2021-09-08 DIAGNOSIS — Z98.89 OTHER SPECIFIED POSTPROCEDURAL STATES: Chronic | ICD-10-CM

## 2021-09-08 LAB
ALBUMIN SERPL ELPH-MCNC: 4.3 G/DL — SIGNIFICANT CHANGE UP (ref 3.3–5)
ALP SERPL-CCNC: 65 U/L — SIGNIFICANT CHANGE UP (ref 40–120)
ALT FLD-CCNC: 18 U/L — SIGNIFICANT CHANGE UP (ref 10–45)
ANION GAP SERPL CALC-SCNC: 17 MMOL/L — SIGNIFICANT CHANGE UP (ref 5–17)
APTT BLD: 33.8 SEC — SIGNIFICANT CHANGE UP (ref 27.5–35.5)
AST SERPL-CCNC: 25 U/L — SIGNIFICANT CHANGE UP (ref 10–40)
BASOPHILS # BLD AUTO: 0.03 K/UL — SIGNIFICANT CHANGE UP (ref 0–0.2)
BASOPHILS NFR BLD AUTO: 0.3 % — SIGNIFICANT CHANGE UP (ref 0–2)
BILIRUB SERPL-MCNC: 0.6 MG/DL — SIGNIFICANT CHANGE UP (ref 0.2–1.2)
BUN SERPL-MCNC: 10 MG/DL — SIGNIFICANT CHANGE UP (ref 7–23)
CALCIUM SERPL-MCNC: 9.8 MG/DL — SIGNIFICANT CHANGE UP (ref 8.4–10.5)
CHLORIDE SERPL-SCNC: 104 MMOL/L — SIGNIFICANT CHANGE UP (ref 96–108)
CO2 SERPL-SCNC: 20 MMOL/L — LOW (ref 22–31)
CREAT SERPL-MCNC: 0.56 MG/DL — SIGNIFICANT CHANGE UP (ref 0.5–1.3)
EOSINOPHIL # BLD AUTO: 0.02 K/UL — SIGNIFICANT CHANGE UP (ref 0–0.5)
EOSINOPHIL NFR BLD AUTO: 0.2 % — SIGNIFICANT CHANGE UP (ref 0–6)
GLUCOSE SERPL-MCNC: 96 MG/DL — SIGNIFICANT CHANGE UP (ref 70–99)
HCT VFR BLD CALC: 43.5 % — SIGNIFICANT CHANGE UP (ref 34.5–45)
HGB BLD-MCNC: 14 G/DL — SIGNIFICANT CHANGE UP (ref 11.5–15.5)
IMM GRANULOCYTES NFR BLD AUTO: 0.2 % — SIGNIFICANT CHANGE UP (ref 0–1.5)
INR BLD: 1.04 RATIO — SIGNIFICANT CHANGE UP (ref 0.88–1.16)
LYMPHOCYTES # BLD AUTO: 1.82 K/UL — SIGNIFICANT CHANGE UP (ref 1–3.3)
LYMPHOCYTES # BLD AUTO: 17.3 % — SIGNIFICANT CHANGE UP (ref 13–44)
MCHC RBC-ENTMCNC: 29.5 PG — SIGNIFICANT CHANGE UP (ref 27–34)
MCHC RBC-ENTMCNC: 32.2 GM/DL — SIGNIFICANT CHANGE UP (ref 32–36)
MCV RBC AUTO: 91.6 FL — SIGNIFICANT CHANGE UP (ref 80–100)
MONOCYTES # BLD AUTO: 0.68 K/UL — SIGNIFICANT CHANGE UP (ref 0–0.9)
MONOCYTES NFR BLD AUTO: 6.5 % — SIGNIFICANT CHANGE UP (ref 2–14)
NEUTROPHILS # BLD AUTO: 7.96 K/UL — HIGH (ref 1.8–7.4)
NEUTROPHILS NFR BLD AUTO: 75.5 % — SIGNIFICANT CHANGE UP (ref 43–77)
NRBC # BLD: 0 /100 WBCS — SIGNIFICANT CHANGE UP (ref 0–0)
NT-PROBNP SERPL-SCNC: 1234 PG/ML — HIGH (ref 0–300)
PLATELET # BLD AUTO: 299 K/UL — SIGNIFICANT CHANGE UP (ref 150–400)
POTASSIUM SERPL-MCNC: 4.3 MMOL/L — SIGNIFICANT CHANGE UP (ref 3.5–5.3)
POTASSIUM SERPL-SCNC: 4.3 MMOL/L — SIGNIFICANT CHANGE UP (ref 3.5–5.3)
PROT SERPL-MCNC: 6.9 G/DL — SIGNIFICANT CHANGE UP (ref 6–8.3)
PROTHROM AB SERPL-ACNC: 12.4 SEC — SIGNIFICANT CHANGE UP (ref 10.6–13.6)
RBC # BLD: 4.75 M/UL — SIGNIFICANT CHANGE UP (ref 3.8–5.2)
RBC # FLD: 12.9 % — SIGNIFICANT CHANGE UP (ref 10.3–14.5)
SARS-COV-2 RNA SPEC QL NAA+PROBE: SIGNIFICANT CHANGE UP
SODIUM SERPL-SCNC: 142 MMOL/L — SIGNIFICANT CHANGE UP (ref 135–145)
TROPONIN T, HIGH SENSITIVITY RESULT: 13 NG/L — SIGNIFICANT CHANGE UP (ref 0–51)
TROPONIN T, HIGH SENSITIVITY RESULT: 16 NG/L — SIGNIFICANT CHANGE UP (ref 0–51)
WBC # BLD: 10.53 K/UL — HIGH (ref 3.8–10.5)
WBC # FLD AUTO: 10.53 K/UL — HIGH (ref 3.8–10.5)

## 2021-09-08 PROCEDURE — G1004: CPT

## 2021-09-08 PROCEDURE — 99215 OFFICE O/P EST HI 40 MIN: CPT

## 2021-09-08 PROCEDURE — 71275 CT ANGIOGRAPHY CHEST: CPT | Mod: 26,MG

## 2021-09-08 PROCEDURE — 99223 1ST HOSP IP/OBS HIGH 75: CPT

## 2021-09-08 PROCEDURE — 93010 ELECTROCARDIOGRAM REPORT: CPT

## 2021-09-08 PROCEDURE — 99285 EMERGENCY DEPT VISIT HI MDM: CPT

## 2021-09-08 RX ORDER — METOPROLOL TARTRATE 50 MG
12.5 TABLET ORAL DAILY
Refills: 0 | Status: DISCONTINUED | OUTPATIENT
Start: 2021-09-08 | End: 2021-09-10

## 2021-09-08 RX ORDER — ENOXAPARIN SODIUM 100 MG/ML
40 INJECTION SUBCUTANEOUS DAILY
Refills: 0 | Status: DISCONTINUED | OUTPATIENT
Start: 2021-09-08 | End: 2021-09-10

## 2021-09-08 RX ORDER — VALACYCLOVIR 500 MG/1
500 TABLET, FILM COATED ORAL DAILY
Refills: 0 | Status: DISCONTINUED | OUTPATIENT
Start: 2021-09-08 | End: 2021-09-10

## 2021-09-08 RX ORDER — ATORVASTATIN CALCIUM 80 MG/1
20 TABLET, FILM COATED ORAL AT BEDTIME
Refills: 0 | Status: DISCONTINUED | OUTPATIENT
Start: 2021-09-08 | End: 2021-09-10

## 2021-09-08 RX ORDER — ONDANSETRON 8 MG/1
4 TABLET, FILM COATED ORAL EVERY 8 HOURS
Refills: 0 | Status: DISCONTINUED | OUTPATIENT
Start: 2021-09-08 | End: 2021-09-10

## 2021-09-08 RX ORDER — PANTOPRAZOLE SODIUM 20 MG/1
40 TABLET, DELAYED RELEASE ORAL
Refills: 0 | Status: DISCONTINUED | OUTPATIENT
Start: 2021-09-08 | End: 2021-09-10

## 2021-09-08 RX ORDER — ACETAMINOPHEN 500 MG
650 TABLET ORAL EVERY 6 HOURS
Refills: 0 | Status: DISCONTINUED | OUTPATIENT
Start: 2021-09-08 | End: 2021-09-10

## 2021-09-08 RX ADMIN — Medication 30 MILLILITER(S): at 16:49

## 2021-09-08 NOTE — H&P ADULT - PROBLEM SELECTOR PLAN 3
CT showing possible fat necrosis in bilateral breast soft tissue with indeterminate nodular focus in the left breast soft tissue  - outpt dedicated breast imaging - US vs mammogram vs CT

## 2021-09-08 NOTE — H&P ADULT - PROBLEM SELECTOR PLAN 1
ddx includes ischemic pain vs aortitis  delta trop negative, EKG non ischemic   possibly 2/2 aortitis 2/2 recent reduction in steroids  CTA showing no dissection or aneurysm  - rheum consult in AM (Dr Mckeon, [place])  - cardiology consult in AM (Dr Best)  - check TTE  - monitor on tele   - possible further ischemic work-up pending cardiology eval ddx includes ischemic pain vs aortitis  delta trop negative, EKG w/o KENNEY, +STD in I/AVL with TWI in V4-V6, no prior EKGs to compare to   possibly 2/2 aortitis 2/2 recent reduction in steroids  CTA showing no dissection or aneurysm  - rheum consult in AM (Dr Mckeon, [place] [facility])  - cardiology consult in AM (Dr Best)  - check TTE  - monitor on tele   - possible further ischemic work-up pending cardiology eval

## 2021-09-08 NOTE — H&P ADULT - NSHPREVIEWOFSYSTEMS_GEN_ALL_CORE
REVIEW OF SYSTEMS:    CONSTITUTIONAL: No weakness, fevers, chills  EYES/ENT: No visual changes;  no throat pain   NECK: No pain or stiffness  RESPIRATORY: No cough, no shortness of breath  CARDIOVASCULAR: +chest pain no palpitations  GASTROINTESTINAL: no nausea, vomiting, no abdominal pain, no BRBPR  GENITOURINARY: no polyuria, no dysuria  NEUROLOGICAL: +numbness in R foot, no headaches, no confusion   MUSCULOSKELETAL: +back pain and RLE pain, no weakness   SKIN: No itching, burning, rashes, or lesions   PSYCH: no anxiety, depression  HEME: no gum bleeding, no bruising

## 2021-09-08 NOTE — ED PROVIDER NOTE - NS ED ATTENDING STATEMENT MOD
Per patient there are no changes in medication, dosage, sig or quantity.  The medication (s) are set up as pending and waiting for your approval.  The preferred pharmacy has been set up and verified.    CELEBREX 200 MG capsule 30 capsule 2 4/24/2017     Sig - Route: Take 1 capsule by mouth daily. - Oral         Attending with

## 2021-09-08 NOTE — ED PROVIDER NOTE - ATTENDING CONTRIBUTION TO CARE
I performed a history and physical exam of the patient and discussed their management with the resident and /or advanced care provider. I reviewed the resident and /or ACP's note and agree with the documented findings and plan of care. My medical decision making and observations are found above.  Lungs clear, awake alert, abd soft

## 2021-09-08 NOTE — H&P ADULT - PROBLEM SELECTOR PLAN 4
Detail Level: Zone
Detail Level: Generalized
Detail Level: Detailed
Detail Level: Simple
h/o aortitis with intermittent episodes of CP   rheumatology consult in AM   c/w prednisone 145mg daily for now

## 2021-09-08 NOTE — H&P ADULT - NSICDXPASTMEDICALHX_GEN_ALL_CORE_FT
PAST MEDICAL HISTORY:  Aortitis     GERD (gastroesophageal reflux disease)     HCV (hepatitis C virus)     TB lung, latent

## 2021-09-08 NOTE — ED ADULT NURSE NOTE - OBJECTIVE STATEMENT
61yF BIBEMS from outpatient MD office for complaints of chest pain and R leg pain. PMHx of . Pt reports x2 episodes of anterior wall chest pain described as stabbing, sudden onset, first episode lasted ~5min and second episode 61yF KAYCEE from outpatient MD office for complaints of chest pain and R leg pain. PMHx of . Pt reports x2 episodes of anterior wall chest pain described as stabbing, sudden onset, first episode lasted ~5min and second episode lasting ~30min. Pt took ASA and was given oxygen for first episodes because she was on a flight (pt works as a ) and pain resolved. Pain resolved on own for second episode. Denies N/V, SOB, dizziness/lightheadedness, syncope, fevers/chills, urinary symptoms. Pt saw outpatient MD today who referred her to hospital for further work up. Pt also reports t4mtiac of R leg pain, numbness and tingling. Pt AAOx4, VS as documented, pt placed on cardiac monitor (NSR noted at this time). Pt lungs clear BL. Pt has no increased WOB, labored respirations, or retractions. Pt abdomen soft and nontender to palpation. Pt has + pulses in UE and LE BL. Pt has no edema in LE BL. Bed locked in lowest position with appropriate side rails raised. Pt given call bell and explained call bell system. Pt aware of plan to await ED MD assessment. Pt has no other questions or concerns at this time.

## 2021-09-08 NOTE — H&P ADULT - HISTORY OF PRESENT ILLNESS
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, HCV, latent TB, GERD p/w CP. Pt states she has 2 episodes of CP in the past 3 days. First episode was while she was flying (she is a ), severe, 10/10, midsternal pain, radiating to back, no associated SOB, n/v, diaphoresis, lightheadedness. Lasted for 5 minutes before passing. She had another similar episodes yesterday, although less intense, which lasted 30 minutes. She does not know of any provoking factors, denies any stress or exertion when the episodes occur; passes on its own without intervention after some time. Pt states feels similar to pain she had when she was diagnosed with aortitis and has had similar episodes whenever her prednisone dose is reduced; she was reduced form 20mg to 15mg about 2 weeks ago. Saw Dr Mckeon today due to these episodes and told to come to ED for further evaluation.   Pt also c/o one month of RLE pain - pain is from her buttock down to her feet, associated with paresthesias in her feet, mostly locate don posterior aspect of leg; pain is constant but worse with movement; pt has hx pf sciatica many years ago and this feels similar, but pain has not been getting any better over past few weeks.  No urinary or bowel incontinence, no recent fall or trauma.   Denies fevers, chills, CP, SOB, abd pain, n/v, urinary symptoms.

## 2021-09-08 NOTE — ED ADULT NURSE NOTE - BREATHING, MLM
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 He otherwise feels at baseline and would prefer to hold off on taking study drug until he speaks with [facility] CF doctor, Dr. Flores and be observed off drug, rather then withdraw from the study.\par \par He feels at a new baseline and is exacerbating more frequently since last hospitalization in July 2018.\par Pulmonary:\par frequent history of hemoptysis in the past, maintained on daily vitamin K supplement. \par History of severe C. difficile colitis during hospitalization in July, 2018 after being treated with oral ciprofloxacin. \par Discontinue PO vanco. \par Resumed azithromycin TIW  \par \par FEV appears to be at baseline before start of Corbus trial, between 38% - 40% since at least October, 2018. . Sputum positive for Pseudomonas and fungus Candida blanki - and has been on noxafil (posaconazole) since 2014. \par Hx of bronchoscopy with + candida blanki and persistent positivity.\par did not tolerate voriconazole. \par Unable to tolerate inhaled antibiotics [place] - caused hemoptysis. Tobramycin TIS?- caused hemoptysis but definitely due to dry powder . PATIENT MAY BENEFIT FROM TRYING ANOTHER MAINTENANCE INHALED ANTIBIOTIC AS HE HAS EXACERBATED 4 TIMES THIS YEAR ALONE. \par colistin - rash\par \par Alternatives to consider for inhalation use inh ceftazidime, levaquin. \par \par No evidence of  AFB, resume Azithro TIW. \par Baseline FEV1 in 39-40% range. \par ACT - no HS. uses albuterol, and pulmozyme. Does aerobika with neb BID-TID. Good adherence.\par \par Chronic respiratory failure - was discharged with bilevel use with 1.5 L O2 since 2014 hospitalization with fungal pneumonia where he was in the ICU. Has been using bilevel for the past 5 years. Reviewed VBG with PCO in 55 back in 2014.\par DME - [facility].\par \par Chest x-ray 12/2018 - chronic features of bronchiectasis.\par \par ENT - sinus congestion from a URI, not affecting his breathing. \par Pt did not fill script for trial of Afrin x 3 days, and switch flonase to azelastine. \par Pt stayed on azelastine.\par chronic sinusitis and nasal polyposis. Last sinus surgery was 2013. Has multiple ENT physicians. One who removes frequent cerumen impaction. Another ENT who would be performing surgeries if needed.\par \par GI - history of meconium ileus, that led to a perforation, needing ostomy which was reversed. Chronic constipation on MiraLax daily.\par \par Endocrine- OGTT impaired in 2016 \par CFRD on OGTT results performed on 5/24/19- 121/232/216 - pt had appointment with diabetes education 7/12/19\par ask endo to move up appointment. 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 \par \par Research - [id number]. \par \par Health Maintenance\par -up to date with flu vaccine, needs PNA  \par -needs BD, script provided again\par -annual labs drawn up to date\par \par ADVANCED CARE PLANNING - d/w pt regarding intubation and lung transplant. Went over decision aid, risks/benefits of both procedures. Answered pts questions. \par \par Subject is here for Visit 6 of the [id number].\par Subject returned all used and unused medication. New drug dispensed however pt will remain off study drug until he notifies me of resuming. 
Spontaneous, unlabored and symmetrical

## 2021-09-08 NOTE — ED PROVIDER NOTE - CLINICAL SUMMARY MEDICAL DECISION MAKING FREE TEXT BOX
René: hx of aortitis now with 2-3 episodes of chest pain. not radiating to back. +concern for aneurism or heart ischemic disease. would get labs and CT of aorta. PE not as likely. Would admit for pain.

## 2021-09-08 NOTE — H&P ADULT - PROBLEM SELECTOR PLAN 2
back pain radiating down RLE with paresthesias x 1 month, likely sciatica   - will check CT lumbar spine   - pain control with tylenol and oxycodone for now

## 2021-09-08 NOTE — ED ADULT TRIAGE NOTE - CHIEF COMPLAINT QUOTE
went to PMD for leg pain. pt stated that she had CP yesterday, PMD sent her to ED for further eval. denies CP currently.

## 2021-09-08 NOTE — ED PROVIDER NOTE - OBJECTIVE STATEMENT
62 yo female with pmhx aortitis (on prednisone, recently decreased), HCV, latent TB on rifampin, GERD, presenting with 3-4 days of CP. She is a , during flight felt sudden midsternal CP, nonradiating, had been persistent since then (was given oxygen on plane with no improvement of sx). Pain improved mildly today, but persisted, saw Rheum today, who rec go to ED for further evaluation/admission. Had extensive workup in March 2021 in [place] with dx with aortitis.  Also endorses having RLE pain, has h/o sciatica s/p fall 12 years ago and has chronic pain in RLE.    Denies SOB, N/V, LOC, vision changes, weakness.

## 2021-09-08 NOTE — H&P ADULT - NSHPLABSRESULTS_GEN_ALL_CORE
Labs, imaging and EKG personally reviewed and interpreted by me.   labs notable for normal WBC, Hb, electrolytes; trop 30/32, elevated BNP  Imaging personally reviewed and interpreted by me - CXR w/o consolidation or effusions.   EKG personally reviewed and interpreted by me - NSR, no acute ischemic changes.                         14.0   10.53 )-----------( 299      ( 08 Sep 2021 16:48 )             43.5     09-08    142  |  104  |  10  ----------------------------<  96  4.3   |  20<L>  |  0.56    Ca    9.8      08 Sep 2021 16:48    TPro  6.9  /  Alb  4.3  /  TBili  0.6  /  DBili  x   /  AST  25  /  ALT  18  /  AlkPhos  65  09-08        PT/INR - ( 08 Sep 2021 16:48 )   PT: 12.4 sec;   INR: 1.04 ratio         PTT - ( 08 Sep 2021 16:48 )  PTT:33.8 sec      < from: CT Angio Chest Aorta w/wo IV Cont (09.08.21 @ 20:56) >    IMPRESSION:    No thoracic aortic dissection.    Suggest fat necrosis in bilateral breast soft tissue. Indeterminate nodular focus in the left breast soft tissue. Recommend correlation with dedicated breast imaging to exclude potential underlying lesion/neoplasm.    Additional findings as described    < end of copied text > Labs, imaging and EKG personally reviewed and interpreted by me.   labs notable for normal WBC, Hb, electrolytes; trop 13/16, elevated BNP  Imaging personally reviewed and interpreted by me - CXR w/o consolidation or effusions.   EKG personally reviewed and interpreted by me - NSR, no acute ischemic changes.                         14.0   10.53 )-----------( 299      ( 08 Sep 2021 16:48 )             43.5     09-08    142  |  104  |  10  ----------------------------<  96  4.3   |  20<L>  |  0.56    Ca    9.8      08 Sep 2021 16:48    TPro  6.9  /  Alb  4.3  /  TBili  0.6  /  DBili  x   /  AST  25  /  ALT  18  /  AlkPhos  65  09-08        PT/INR - ( 08 Sep 2021 16:48 )   PT: 12.4 sec;   INR: 1.04 ratio         PTT - ( 08 Sep 2021 16:48 )  PTT:33.8 sec      < from: CT Angio Chest Aorta w/wo IV Cont (09.08.21 @ 20:56) >    IMPRESSION:    No thoracic aortic dissection.    Suggest fat necrosis in bilateral breast soft tissue. Indeterminate nodular focus in the left breast soft tissue. Recommend correlation with dedicated breast imaging to exclude potential underlying lesion/neoplasm.    Additional findings as described    < end of copied text > Labs, imaging and EKG personally reviewed and interpreted by me.   labs notable for normal WBC, Hb, electrolytes; trop 13/16, elevated BNP  Imaging personally reviewed and interpreted by me - CXR w/o consolidation or effusions.   EKG personally reviewed and interpreted by me - NSR, STD in I/AVL with TWI in V4-V6, LVH, .                        14.0   10.53 )-----------( 299      ( 08 Sep 2021 16:48 )             43.5     09-08    142  |  104  |  10  ----------------------------<  96  4.3   |  20<L>  |  0.56    Ca    9.8      08 Sep 2021 16:48    TPro  6.9  /  Alb  4.3  /  TBili  0.6  /  DBili  x   /  AST  25  /  ALT  18  /  AlkPhos  65  09-08        PT/INR - ( 08 Sep 2021 16:48 )   PT: 12.4 sec;   INR: 1.04 ratio         PTT - ( 08 Sep 2021 16:48 )  PTT:33.8 sec      < from: CT Angio Chest Aorta w/wo IV Cont (09.08.21 @ 20:56) >    IMPRESSION:    No thoracic aortic dissection.    Suggest fat necrosis in bilateral breast soft tissue. Indeterminate nodular focus in the left breast soft tissue. Recommend correlation with dedicated breast imaging to exclude potential underlying lesion/neoplasm.    Additional findings as described    < end of copied text >

## 2021-09-08 NOTE — H&P ADULT - NSHPPHYSICALEXAM_GEN_ALL_CORE
Vital Signs Last 24 Hrs  T(C): 36.6 (08 Sep 2021 22:55), Max: 36.7 (08 Sep 2021 15:35)  T(F): 97.9 (08 Sep 2021 22:55), Max: 98.1 (08 Sep 2021 15:35)  HR: 78 (08 Sep 2021 22:55) (78 - 93)  BP: 158/94 (08 Sep 2021 22:55) (112/70 - 158/94)  BP(mean): 110 (08 Sep 2021 18:18) (110 - 110)  RR: 18 (08 Sep 2021 22:55) (18 - 20)  SpO2: 100% (08 Sep 2021 22:55) (98% - 100%)    PHYSICAL EXAM:  GENERAL: NAD, well-developed  HEAD:  Atraumatic, normocephalic  EYES: EOMI, conjunctiva and sclera clear  NECK: Supple, no JVD  CHEST/LUNG: Clear to auscultation bilaterally; no wheezing or rales  HEART: Regular rate and rhythm; no murmurs  ABDOMEN: Soft, nontender, nondistended; bowel sounds present  EXTREMITIES:  2+ Peripheral Pulses, no edema  PSYCH: calm affect, not anxious  NEUROLOGY: non-focal, AAOx3, moving all extremities equally   SKIN: No rashes or lesions  MUSCULOSKELETAL: full strength in RLE, no back pain elicited, no joint swelling

## 2021-09-08 NOTE — H&P ADULT - NSHPSOCIALHISTORY_GEN_ALL_CORE
current smoker 5-7 cigarettes per day x 35 years; no etoh no drugs  lives at home with spouse  works as

## 2021-09-09 DIAGNOSIS — R09.89 OTHER SPECIFIED SYMPTOMS AND SIGNS INVOLVING THE CIRCULATORY AND RESPIRATORY SYSTEMS: ICD-10-CM

## 2021-09-09 DIAGNOSIS — Z98.890 OTHER SPECIFIED POSTPROCEDURAL STATES: Chronic | ICD-10-CM

## 2021-09-09 DIAGNOSIS — I77.6 ARTERITIS, UNSPECIFIED: ICD-10-CM

## 2021-09-09 DIAGNOSIS — M54.9 DORSALGIA, UNSPECIFIED: ICD-10-CM

## 2021-09-09 DIAGNOSIS — R93.89 ABNORMAL FINDINGS ON DIAGNOSTIC IMAGING OF OTHER SPECIFIED BODY STRUCTURES: ICD-10-CM

## 2021-09-09 DIAGNOSIS — R07.9 CHEST PAIN, UNSPECIFIED: ICD-10-CM

## 2021-09-09 DIAGNOSIS — Z22.7 LATENT TUBERCULOSIS: ICD-10-CM

## 2021-09-09 DIAGNOSIS — K21.9 GASTRO-ESOPHAGEAL REFLUX DISEASE WITHOUT ESOPHAGITIS: ICD-10-CM

## 2021-09-09 LAB
HCV AB S/CO SERPL IA: 14.57 S/CO — HIGH (ref 0–0.99)
HCV AB SERPL-IMP: REACTIVE

## 2021-09-09 PROCEDURE — 93306 TTE W/DOPPLER COMPLETE: CPT | Mod: 26

## 2021-09-09 PROCEDURE — 72131 CT LUMBAR SPINE W/O DYE: CPT | Mod: 26

## 2021-09-09 RX ORDER — OXYCODONE HYDROCHLORIDE 5 MG/1
5 TABLET ORAL EVERY 6 HOURS
Refills: 0 | Status: DISCONTINUED | OUTPATIENT
Start: 2021-09-09 | End: 2021-09-10

## 2021-09-09 RX ORDER — INFLUENZA VIRUS VACCINE 15; 15; 15; 15 UG/.5ML; UG/.5ML; UG/.5ML; UG/.5ML
0.5 SUSPENSION INTRAMUSCULAR ONCE
Refills: 0 | Status: DISCONTINUED | OUTPATIENT
Start: 2021-09-09 | End: 2021-09-10

## 2021-09-09 RX ADMIN — ENOXAPARIN SODIUM 40 MILLIGRAM(S): 100 INJECTION SUBCUTANEOUS at 12:16

## 2021-09-09 RX ADMIN — ATORVASTATIN CALCIUM 20 MILLIGRAM(S): 80 TABLET, FILM COATED ORAL at 21:12

## 2021-09-09 RX ADMIN — PANTOPRAZOLE SODIUM 40 MILLIGRAM(S): 20 TABLET, DELAYED RELEASE ORAL at 05:03

## 2021-09-09 RX ADMIN — Medication 12.5 MILLIGRAM(S): at 05:03

## 2021-09-09 RX ADMIN — OXYCODONE HYDROCHLORIDE 5 MILLIGRAM(S): 5 TABLET ORAL at 02:07

## 2021-09-09 RX ADMIN — Medication 15 MILLIGRAM(S): at 05:03

## 2021-09-09 RX ADMIN — OXYCODONE HYDROCHLORIDE 5 MILLIGRAM(S): 5 TABLET ORAL at 01:14

## 2021-09-09 RX ADMIN — VALACYCLOVIR 500 MILLIGRAM(S): 500 TABLET, FILM COATED ORAL at 12:16

## 2021-09-09 NOTE — CONSULT NOTE ADULT - SUBJECTIVE AND OBJECTIVE BOX
CARDIOLOGY CONSULT - Dr. 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 Pt states she has 2 episodes of CP in the past 3 days. First episode was while she was flying (she is a ), severe, 10/10, midsternal pain, radiating to back, no associated SOB, n/v, diaphoresis, lightheadedness. Lasted for 5 minutes before passing. She had another similar episodes yesterday, although less intense, which lasted 30 minutes. She does not know of any provoking factors, denies any stress or exertion when the episodes occur; passes on its own without intervention after some time. Pt states feels similar to pain she had when she was diagnosed with aortitis and has had similar episodes whenever her prednisone dose is reduced; she was reduced form 20mg to 15mg about 2 weeks ago. Saw Dr Mckeon today due to these episodes and told to come to ED for further evaluation.   Pt also c/o one month of RLE pain - pain is from her buttock down to her feet, associated with paresthesias in her feet, mostly locate don posterior aspect of leg; pain is constant but worse with movement; pt has hx pf sciatica many years ago and this feels similar, but pain has not been getting any better over past few weeks.  No urinary or bowel incontinence, no recent fall or trauma.   Denies fevers, chills, CP, SOB, abd pain, n/v, urinary symptoms. (08 Sep 2021 23:53)    Denies any recent cardiac testing.  pt denies any symptoms assoicated with chest pain.  currently patient denies any chest pain, dyspnea, palpitations, cough, syncope, edema, exertional symptoms, nausea, abdominal pain, fever, chills,  or rash.  Denies any history of CAD, MI, valve disease, cardiomyopathy, or congenital heart disease.     PAST MEDICAL & SURGICAL HISTORY:  Aortitis    TB lung, latent    GERD (gastroesophageal reflux disease)    HCV (hepatitis C virus)    Status post breast reduction            PREVIOUS DIAGNOSTIC TESTING:    [ ] Echocardiogram:  [ ]  Catheterization:  [ ] Stress Test:  	    MEDICATIONS:  Home Medications:  Lipitor 20 mg oral tablet: 1 tab(s) orally once a day (08 Sep 2021 23:52)  metoprolol succinate 25 mg oral capsule, extended release: 0.5 cap(s) orally once a day (08 Sep 2021 23:52)  omeprazole 40 mg oral delayed release capsule: 1 cap(s) orally once a day (08 Sep 2021 23:52)  predniSONE 10 mg oral tablet: 1.5 tab(s) orally once a day (08 Sep 2021 23:52)  rifAMPin 300 mg oral capsule: 2 cap(s) orally once a day (08 Sep 2021 23:52)  Valtrex 500 mg oral tablet: 1 tab(s) orally once a day (08 Sep 2021 23:52)      MEDICATIONS  (STANDING):  atorvastatin 20 milliGRAM(s) Oral at bedtime  enoxaparin Injectable 40 milliGRAM(s) SubCutaneous daily  influenza   Vaccine 0.5 milliLiter(s) IntraMuscular once  metoprolol succinate ER 12.5 milliGRAM(s) Oral daily  pantoprazole    Tablet 40 milliGRAM(s) Oral before breakfast  predniSONE   Tablet 15 milliGRAM(s) Oral daily  rifAMPin 600 milliGRAM(s) Oral daily  valACYclovir 500 milliGRAM(s) Oral daily      FAMILY HISTORY:  FH: CAD (coronary artery disease) (Father, Sibling)        SOCIAL HISTORY:    [ x] Non-smoker  [ ] Smoker  [ ] Alcohol    Allergies    No Known Allergies    Intolerances    	    REVIEW OF SYSTEMS:  CONSTITUTIONAL: No fever, weight loss, or fatigue  EYES: No eye pain, visual disturbances, or discharge  ENMT:  No difficulty hearing, tinnitus, vertigo; No sinus or throat pain  NECK: No pain or stiffness  RESPIRATORY: No cough, wheezing, chills or hemoptysis; No Shortness of Breath  CARDIOVASCULAR: + chest pain, no palpitations, passing out, dizziness, or leg swelling  GASTROINTESTINAL: No abdominal or epigastric pain. No nausea, vomiting, or hematemesis; No diarrhea or constipation. No melena or hematochezia.  GENITOURINARY: No dysuria, frequency, hematuria, or incontinence  NEUROLOGICAL: No headaches, memory loss, loss of strength, numbness, or tremors  SKIN: No itching, burning, rashes, or lesions   	    [x ] All others negative  see hpi 	  [ ] Unable to obtain    PHYSICAL EXAM:  T(C): 36.7 (09-09-21 @ 12:22), Max: 36.7 (09-08-21 @ 15:35)  HR: 67 (09-09-21 @ 12:22) (67 - 93)  BP: 121/76 (09-09-21 @ 12:22) (112/63 - 158/94)  RR: 18 (09-09-21 @ 12:22) (18 - 20)  SpO2: 97% (09-09-21 @ 12:22) (95% - 100%)  Wt(kg): --  I&O's Summary      Appearance: Normal	  Psychiatry: A & O x 3, Mood & affect appropriate  HEENT:   Normal oral mucosa, PERRL, EOMI	  Lymphatic: No lymphadenopathy  Cardiovascular: Normal S1 S2,RRR, No JVD, No murmurs  Respiratory: Lungs clear to auscultation	  Gastrointestinal:  Soft, Non-tender, + BS	  Skin: No rashes, No ecchymoses, No cyanosis	  Neurologic: Non-focal  Extremities: Normal range of motion, No clubbing, cyanosis or edema  Vascular: Peripheral pulses palpable 2+ bilaterally    TELEMETRY: NSR 	    ECG:  per provider note - nsr, short pr, lvh, no acute ischemic changes 	  RADIOLOGY:  < from: CT Angio Chest Aorta w/wo IV Cont (09.08.21 @ 20:56) >  FINDINGS:    LUNGS AND AIRWAYS: Patent central airways. Small triangular opacity along the minor fissure, suggestive of intrafissural lymph node. Subsegmental bibasilar atelectasis.    PLEURA: No pleural effusion or pneumothorax.    MEDIASTINUM AND YEIMY: Subcentimeter lymph nodes without lymphadenopathy.    VESSELS: No thoracic aortic intramural hematoma, dissection or aneurysm. Bovine arch.    HEART: Normal heart size. No pericardial effusion.    CHEST WALL AND LOWER NECK: Fatty structure with stranding in bilateral breast soft tissue, suggesting fat necrosis. A 0.6 x 0.7 cm nodular focus in the left breast soft tissue.    VISUALIZED UPPER ABDOMEN: A 2.0 x 2.1 cm cyst in the left hepatic lobe. Subcentimeter hypodense focus in the right hepatic lobe, too small to characterize.    BONES: Degenerative changes of the spine.    IMPRESSION:    No thoracic aortic dissection.    Suggest fat necrosis in bilateral breast soft tissue. Indeterminate nodular focus in the left breast soft tissue. Recommend correlation with dedicated breast imaging to exclude potential underlying lesion/neoplasm.    Additional findings as described    --- End of Report ---      < end of copied text >    OTHER: 	  	  LABS:	 	    CARDIAC MARKERS:  Troponin T, High Sensitivity Result: 16 ng/L (09-08 @ 18:23)  Troponin T, High Sensitivity Result: 13 ng/L (09-08 @ 16:48)                                  14.0   10.53 )-----------( 299      ( 08 Sep 2021 16:48 )             43.5     09-08    142  |  104  |  10  ----------------------------<  96  4.3   |  20<L>  |  0.56    Ca    9.8      08 Sep 2021 16:48    TPro  6.9  /  Alb  4.3  /  TBili  0.6  /  DBili  x   /  AST  25  /  ALT  18  /  AlkPhos  65  09-08    PT/INR - ( 08 Sep 2021 16:48 )   PT: 12.4 sec;   INR: 1.04 ratio         PTT - ( 08 Sep 2021 16:48 )  PTT:33.8 sec  proBNP: Serum Pro-Brain Natriuretic Peptide: 1234 pg/mL (09-08 @ 16:48)    Lipid Profile:   HgA1c:   TSH:       
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  Rheumatology on consult for hx of large vessel vasculitis.   Symptoms started 3-4 days prior to admission.  Sharp, substernal, intermittent CP that has no aggravating or relieving factors.   No fever, chills, n/v/d, abdominal pain, cough or SOB.   Hx of large vessel vasculitis:   Dx in 4/2021, she was getting intermittent CP and abdominal pain radiating to her back.  CT scan showed thoracic and abdominal aortic wall thickening.  Was started on Prednisone 60 mg daily in 4/2021 with slow taper.  CP symptoms became better with steroids, CT in 5/2021 and then MRA in 8/2021 showed no aortic wall thickening.   Serologies (ALDAIR, ANCA, IGG 4, NENO, Sjogrens') were negative.   Was on 15 mg Prednisone daily when current symptoms restarted.       PAST MEDICAL & SURGICAL HISTORY:  Aortitis    TB lung, latent    GERD (gastroesophageal reflux disease)    HCV (hepatitis C virus)    Status post breast reduction    Review of Systems:  As per HPI    MEDICATIONS  (STANDING):  atorvastatin 20 milliGRAM(s) Oral at bedtime  enoxaparin Injectable 40 milliGRAM(s) SubCutaneous daily  influenza   Vaccine 0.5 milliLiter(s) IntraMuscular once  metoprolol succinate ER 12.5 milliGRAM(s) Oral daily  pantoprazole    Tablet 40 milliGRAM(s) Oral before breakfast  predniSONE   Tablet 15 milliGRAM(s) Oral daily  rifAMPin 600 milliGRAM(s) Oral daily  valACYclovir 500 milliGRAM(s) Oral daily    MEDICATIONS  (PRN):  acetaminophen   Tablet .. 650 milliGRAM(s) Oral every 6 hours PRN Temp greater or equal to 38.5C (101.3F), Mild Pain (1 - 3)  ondansetron Injectable 4 milliGRAM(s) IV Push every 8 hours PRN Nausea and/or Vomiting  oxyCODONE    IR 5 milliGRAM(s) Oral every 6 hours PRN Moderate Pain (4 - 6)      Pertinent Medication history:  Home Medications:  Lipitor 20 mg oral tablet: 1 tab(s) orally once a day (08 Sep 2021 23:52)  metoprolol succinate 25 mg oral capsule, extended release: 0.5 cap(s) orally once a day (08 Sep 2021 23:52)  omeprazole 40 mg oral delayed release capsule: 1 cap(s) orally once a day (08 Sep 2021 23:52)  predniSONE 10 mg oral tablet: 1.5 tab(s) orally once a day (08 Sep 2021 23:52)  rifAMPin 300 mg oral capsule: 2 cap(s) orally once a day (08 Sep 2021 23:52)  Valtrex 500 mg oral tablet: 1 tab(s) orally once a day (08 Sep 2021 23:52)      Allergies    No Known Allergies    Intolerances      SOCIAL HISTORY:  Current smoker, no alcohol or illicit drugs.     FAMILY HISTORY:  FH: CAD (coronary artery disease) (Father, Sibling)        Vital Signs Last 24 Hrs  T(C): 36.7 (09 Sep 2021 12:22), Max: 36.7 (08 Sep 2021 18:18)  T(F): 98 (09 Sep 2021 12:22), Max: 98.1 (08 Sep 2021 18:18)  HR: 67 (09 Sep 2021 12:22) (67 - 84)  BP: 121/76 (09 Sep 2021 12:22) (112/63 - 158/94)  BP(mean): 110 (08 Sep 2021 18:18) (110 - 110)  RR: 18 (09 Sep 2021 12:22) (18 - 18)  SpO2: 97% (09 Sep 2021 12:22) (95% - 100%)    Daily     Daily     Physical Exam:  General: No apparent distress  HEENT: EOMI, MMM  CVS: +S1/S2, RRR  Resp: CTA b/l  GI: Soft, NT/ND  MSK: No signs of synovitis, limitation of ROM or deformity in any joint.   Neuro: AAOx3  Skin: No rashes    LABS:                        14.0   10.53 )-----------( 299      ( 08 Sep 2021 16:48 )             43.5     09-08    142  |  104  |  10  ----------------------------<  96  4.3   |  20<L>  |  0.56    Ca    9.8      08 Sep 2021 16:48    TPro  6.9  /  Alb  4.3  /  TBili  0.6  /  DBili  x   /  AST  25  /  ALT  18  /  AlkPhos  65  09-08    PT/INR - ( 08 Sep 2021 16:48 )   PT: 12.4 sec;   INR: 1.04 ratio         PTT - ( 08 Sep 2021 16:48 )  PTT:33.8 sec      RADIOLOGY & ADDITIONAL STUDIES:    EXAM:  CT ANGIO CHEST AORTA [facility]                            PROCEDURE DATE:  09/08/2021            INTERPRETATION:  CLINICAL INFORMATION: Chest pain. History of aortitis. Evaluate for dissection versus aneurysm.    COMPARISON: None.    CONTRAST/COMPLICATIONS:  IV Contrast: Omnipaque 350  90 cc administered   10 cc discarded  Oral Contrast: None  Complications: None reported    PROCEDURE:  A noncontrast enhanced CT of the Chest was performed followed by a CT Angiography of the Chest.  Sagittal and coronal reformats were performed as well as 3D (MIP) reconstructions.    FINDINGS:    LUNGS AND AIRWAYS: Patent central airways. Small triangular opacity along the minor fissure, suggestive of intrafissural lymph node. Subsegmental bibasilar atelectasis.    PLEURA: No pleural effusion or pneumothorax.    MEDIASTINUM AND YEIMY: Subcentimeter lymph nodes without lymphadenopathy.    VESSELS: No thoracic aortic intramural hematoma, dissection or aneurysm. Bovine arch.    HEART: Normal heart size. No pericardial effusion.    CHEST WALL AND LOWER NECK: Fatty structure with stranding in bilateral breast soft tissue, suggesting fat necrosis. A 0.6 x 0.7 cm nodular focus in the left breast soft tissue.    VISUALIZED UPPER ABDOMEN: A 2.0 x 2.1 cm cyst in the left hepatic lobe. Subcentimeter hypodense focus in the right hepatic lobe, too small to characterize.    BONES: Degenerative changes of the spine.    IMPRESSION:    No thoracic aortic dissection.    Suggest fat necrosis in bilateral breast soft tissue. Indeterminate nodular focus in the left breast soft tissue. Recommend correlation with dedicated breast imaging to exclude potential underlying lesion/neoplasm.    Additional findings as described    --- End of Report ---              DONALDO SANTIAGO MD; Resident Radiologist  This document has been electronically signed.  GABRIELLA REBOLLAR MD; Attending Radiologist  This document has been electronically signed. Sep  8 2021 10:30PM

## 2021-09-09 NOTE — CONSULT NOTE ADULT - ASSESSMENT
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  Rheumatology on consult for hx of large vessel vasculitis.     Relevant data:  . Current episodes of CP started a few days ago, appear to be different than abdominal and back pains she used to have previously when her vasculitis was active.   . Pt has never had CAD w/u previously   . Exam unremarkable.  . CBC, CMP, trops wnl.  . CT chest this admission shows no vasculitis of aorta.  . EKG showed T wave inversions.     Impression:  . Current symptoms likely not related to aortic vasculitis that she has had previously.   . Her recent MRA in 8/2021 and CT this admission were normal.  . A cardiac etiology of symptoms or even esophageal etiology of symptoms are more likely and require w/u.     Recs:   . Continue home prednisone 15 mg daily.  . Get ESR and CRP.  . Thorough cardiac eval to rule out ischemic cardiac dx.     DW with Dr. Lynette Monaco MD  Rheum fellow PGY 5  Pager [phone]

## 2021-09-09 NOTE — CONSULT NOTE ADULT - ASSESSMENT
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 r/t aortitis   -CTA neg for dissection, fat necrosis noted  -BNP elevated, no decomp hf noted on exam  -check echo to rule out LV or valvular dysfunction   -pt notes shes unable to tolerate NST given hx of SVT  -will consider ischemic w/u pending echo results     #Aortitis   -h/o aortitis with intermittent episodes of CP   -c/w prednisone daily  -rheum eval    #hx of SVT s/p ablation  -cont bb    dvt ppx  d/w  at bedside

## 2021-09-09 NOTE — CONSULT NOTE ADULT - TIME BILLING
Agree with above NP note.  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, HCV, latent TB, SVT s/p ablation, GERD p/w CP    #Chest pain  -no active chest pain  -trop negative   -dx with aortitis   -CTA neg for acute aortic disease/dissection  -echo with normal lv/valve function  -pt notes shes unable to tolerate NST given hx of SVT  -previous cath in 2015 after moderate anterior ischemia revealed normal coronaries  -ct coronaries in this scenario will better diagnostic test to r/o obstructive CAD  -will d/w her in am    #Aortitis   -h/o aortitis with intermittent episodes of CP   -sx not similar to previous chest pain associated with active vasculitis  -c/w prednisone daily  -rheum f/u    #hx of SVT s/p ablation  -cont bb    dvt ppx

## 2021-09-09 NOTE — PATIENT PROFILE ADULT - HAS THE PATIENT RECEIVED THE INFLUENZA VACCINE THIS SEASON?
no... Epidermal Autograft Text: The defect edges were debeveled with a #15 scalpel blade.  Given the location of the defect, shape of the defect and the proximity to free margins an epidermal autograft was deemed most appropriate.  Using a sterile surgical marker, the primary defect shape was transferred to the donor site. The epidermal graft was then harvested.  The skin graft was then placed in the primary defect and oriented appropriately.

## 2021-09-10 ENCOUNTER — TRANSCRIPTION ENCOUNTER (OUTPATIENT)
Age: 61
End: 2021-09-10

## 2021-09-10 VITALS
DIASTOLIC BLOOD PRESSURE: 77 MMHG | TEMPERATURE: 99 F | HEART RATE: 79 BPM | RESPIRATION RATE: 18 BRPM | SYSTOLIC BLOOD PRESSURE: 115 MMHG | OXYGEN SATURATION: 98 %

## 2021-09-10 LAB
ANION GAP SERPL CALC-SCNC: 12 MMOL/L — SIGNIFICANT CHANGE UP (ref 5–17)
BILIRUB SERPL-MCNC: 0.2 MG/DL — SIGNIFICANT CHANGE UP (ref 0.2–1.2)
BUN SERPL-MCNC: 13 MG/DL — SIGNIFICANT CHANGE UP (ref 7–23)
CALCIUM SERPL-MCNC: 9.1 MG/DL — SIGNIFICANT CHANGE UP (ref 8.4–10.5)
CHLORIDE SERPL-SCNC: 106 MMOL/L — SIGNIFICANT CHANGE UP (ref 96–108)
CO2 SERPL-SCNC: 24 MMOL/L — SIGNIFICANT CHANGE UP (ref 22–31)
COVID-19 SPIKE DOMAIN AB INTERP: POSITIVE
COVID-19 SPIKE DOMAIN ANTIBODY RESULT: 106 U/ML — HIGH
CREAT SERPL-MCNC: 0.53 MG/DL — SIGNIFICANT CHANGE UP (ref 0.5–1.3)
CREAT SERPL-MCNC: 0.56 MG/DL — SIGNIFICANT CHANGE UP (ref 0.5–1.3)
CRP SERPL-MCNC: 22 MG/L — HIGH (ref 0–4)
ERYTHROCYTE [SEDIMENTATION RATE] IN BLOOD: 27 MM/HR — HIGH (ref 0–20)
GLUCOSE SERPL-MCNC: 96 MG/DL — SIGNIFICANT CHANGE UP (ref 70–99)
HCT VFR BLD CALC: 42.2 % — SIGNIFICANT CHANGE UP (ref 34.5–45)
HGB BLD-MCNC: 13.7 G/DL — SIGNIFICANT CHANGE UP (ref 11.5–15.5)
INR BLD: 1.05 RATIO — SIGNIFICANT CHANGE UP (ref 0.88–1.16)
MCHC RBC-ENTMCNC: 29.5 PG — SIGNIFICANT CHANGE UP (ref 27–34)
MCHC RBC-ENTMCNC: 32.5 GM/DL — SIGNIFICANT CHANGE UP (ref 32–36)
MCV RBC AUTO: 90.8 FL — SIGNIFICANT CHANGE UP (ref 80–100)
MELD SCORE WITH DIALYSIS: 20 POINTS — SIGNIFICANT CHANGE UP
MELD SCORE WITHOUT DIALYSIS: 7 POINTS — SIGNIFICANT CHANGE UP
NRBC # BLD: 0 /100 WBCS — SIGNIFICANT CHANGE UP (ref 0–0)
PLATELET # BLD AUTO: 260 K/UL — SIGNIFICANT CHANGE UP (ref 150–400)
POTASSIUM SERPL-MCNC: 3.9 MMOL/L — SIGNIFICANT CHANGE UP (ref 3.5–5.3)
POTASSIUM SERPL-SCNC: 3.9 MMOL/L — SIGNIFICANT CHANGE UP (ref 3.5–5.3)
PROTHROM AB SERPL-ACNC: 12.6 SEC — SIGNIFICANT CHANGE UP (ref 10.6–13.6)
RBC # BLD: 4.65 M/UL — SIGNIFICANT CHANGE UP (ref 3.8–5.2)
RBC # FLD: 13 % — SIGNIFICANT CHANGE UP (ref 10.3–14.5)
SARS-COV-2 IGG+IGM SERPL QL IA: 106 U/ML — HIGH
SARS-COV-2 IGG+IGM SERPL QL IA: POSITIVE
SODIUM SERPL-SCNC: 142 MMOL/L — SIGNIFICANT CHANGE UP (ref 135–145)
SODIUM SERPL-SCNC: 142 MMOL/L — SIGNIFICANT CHANGE UP (ref 135–145)
WBC # BLD: 8.41 K/UL — SIGNIFICANT CHANGE UP (ref 3.8–10.5)
WBC # FLD AUTO: 8.41 K/UL — SIGNIFICANT CHANGE UP (ref 3.8–10.5)

## 2021-09-10 PROCEDURE — 93970 EXTREMITY STUDY: CPT | Mod: 26

## 2021-09-10 PROCEDURE — 75574 CT ANGIO HRT W/3D IMAGE: CPT | Mod: 26

## 2021-09-10 RX ORDER — METOPROLOL TARTRATE 50 MG
25 TABLET ORAL ONCE
Refills: 0 | Status: COMPLETED | OUTPATIENT
Start: 2021-09-10 | End: 2021-09-10

## 2021-09-10 RX ADMIN — VALACYCLOVIR 500 MILLIGRAM(S): 500 TABLET, FILM COATED ORAL at 11:48

## 2021-09-10 RX ADMIN — Medication 12.5 MILLIGRAM(S): at 05:06

## 2021-09-10 RX ADMIN — ENOXAPARIN SODIUM 40 MILLIGRAM(S): 100 INJECTION SUBCUTANEOUS at 11:49

## 2021-09-10 RX ADMIN — Medication 25 MILLIGRAM(S): at 11:42

## 2021-09-10 RX ADMIN — PANTOPRAZOLE SODIUM 40 MILLIGRAM(S): 20 TABLET, DELAYED RELEASE ORAL at 05:06

## 2021-09-10 RX ADMIN — Medication 15 MILLIGRAM(S): at 05:06

## 2021-09-10 NOTE — DISCHARGE NOTE PROVIDER - HOSPITAL COURSE
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     Problem/Plan - 1:  ·  Problem: Chest pain.   ·  Plan: ddx includes ischemic pain vs aortitis  delta trop negative, EKG w/o KENNEY, +STD in I/AVL with TWI in V4-V6, no prior EKGs to compare to  ( not in chart )   possibly 2/2 aortitis 2/2 recent reduction in steroids  CTA showing no dissection or aneurysm  - rheum appreciated : pain is different from her vasculitic pain .. cont steroids   - cardiology input noted :  CT coronaries .. will dc if cleared by cardio   - monitor on tele      Problem/Plan - 2:  ·  Problem: Pain of back and right lower extremity.   ·  Plan: back pain radiating down RLE with paresthesias x 1 month, likely sciatica   - CT lumbar : no acuet patholgy   - pain control with tylenol and oxycodone for now.     Problem/Plan - 3:  ·  Problem: Abnormal CT scan.   ·  Plan: CT showing possible fat necrosis in bilateral breast soft tissue with indeterminate nodular focus in the left breast soft tissue  - outpt dedicated breast imaging - US vs mammogram vs CT.     Problem/Plan - 4:  ·  Problem: Aortitis.   ·  Plan: h/o aortitis with intermittent episodes of CP   rheumatology consult in AM   c/w prednisone 145mg daily for now.     Problem/Plan - 5:  ·  Problem: TB lung, latent.   ·  Plan: c/w rifampin.     Problem/Plan - 6:  ·  Problem: GERD (gastroesophageal reflux disease).   ·  Plan: c/w PPI.     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    ·  Problem: Chest pain.   ·  Plan: ddx includes ischemic pain vs aortitis  delta trop negative, EKG w/o KENNEY, +STD in I/AVL with TWI in V4-V6, no prior EKGs to compare to  ( not in chart )   possibly 2/2 aortitis 2/2 recent reduction in steroids  CTA showing no dissection or aneurysm, s/p rheumatology and cardiology eval and monitored on tele   s/p CT coronaries : Approximate 20% narrowing in the ostium of the RCA from noncalcified plaque. The remainder of the coronaries are unremarkable. Mild diffuse thickening of the myocardium suggesting LV hypertrophy, measuring up to 16 mm.  NONCARDIAC FINDINGS: Minimal thickening of the ascending and descending thoracic aorta in keeping with known vasculitis.    ·  Problem: Pain of back and right lower extremity.   - back pain radiating down RLE with paresthesias x 1 month, likely sciatica   - CT lumbar : no acuet patholgy, pain management done with Tylenol and oxycodone       ·  Problem: Abnormal CT scan.   ·  - CT showing possible fat necrosis in bilateral breast soft tissue with indeterminate nodular focus in the left breast soft tissue  - outpt dedicated breast imaging - US vs mammogram vs CT.    ·  Problem: Aortitis.   -  h/o aortitis with intermittent episodes of CP. You were seen by rheumatologist, recommended  cardiac workup. Prednisone 15 mg continued     ·  Problem: TB lung, latent.   - rifampin continued      Problem/Plan - 6:  ·  Problem: GERD (gastroesophageal reflux disease).   ·  Plan: c/w PPI.     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    ·  Problem: Chest pain.   ·  Plan: ddx includes ischemic pain vs aortitis  delta trop negative, EKG w/o KENNEY, +STD in I/AVL with TWI in V4-V6, no prior EKGs to compare to  ( not in chart )   possibly 2/2 aortitis 2/2 recent reduction in steroids  CTA showing no dissection or aneurysm, s/p rheumatology and cardiology eval and monitored on tele   s/p CT coronaries : Approximate 20% narrowing in the ostium of the RCA from noncalcified plaque. The remainder of the coronaries are unremarkable. Mild diffuse thickening of the myocardium suggesting LV hypertrophy, measuring up to 16 mm.  NONCARDIAC FINDINGS: Minimal thickening of the ascending and descending thoracic aorta in keeping with known vasculitis.    ·  Problem: Pain of back and right lower extremity.   - back pain radiating down RLE with paresthesias x 1 month, likely sciatica   - CT lumbar : no acuet patholgy, pain management done with Tylenol and oxycodone       ·  Problem: Abnormal CT scan.   ·  - CT showing possible fat necrosis in bilateral breast soft tissue with indeterminate nodular focus in the left breast soft tissue  - outpt dedicated breast imaging - US vs mammogram vs CT.    ·  Problem: Aortitis.   -  h/o aortitis with intermittent episodes of CP. You were seen by rheumatologist, recommended  cardiac workup. Prednisone 15 mg continued     ·  Problem: TB lung, latent.   - rifampin continued       ·  Problem: GERD (gastroesophageal reflux disease).    c/w PPI.    She denies incontinent bowel / bladder episode, leg weakness. pt to follow up with her primary care physician for further evaluation   Recommended to have dedicated breast image for abnormality in CTA  pt to follow up with PCP, cardiology, and rheumatologist

## 2021-09-10 NOTE — PROGRESS NOTE ADULT - ASSESSMENT
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     #Chest pain  -no associated symptoms  -self resolved  -hsT neg, unable to locate ekg, per provider note - NSR with short MA, LVH  -CTA neg for dissection, fat necrosis noted  -BNP elevated, no decomp hf noted on exam  -echo with normal LV function. EF 55%   -pt notes shes unable to tolerate NST given hx of SVT  -Plan for CT coronary today.     #Aortitis   -h/o aortitis with intermittent episodes of CP   -c/w prednisone daily  -rheum f/u    #hx of SVT s/p ablation  -cont bb    d/w patient and NP    # ACP- Advanced Care Planning   -Advanced care planning discussed with patient. Advanced care planning forms discussed with patient and/or family. Risks, benefits and alternatives of medical/cardiac procedures were discussed in detail with all questions awnsered. Up to 30 minutes were spent addressing advance care planning..     dvt ppx       
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    Problem/Plan - 1:  ·  Problem: Chest pain.   ·  Plan: ddx includes ischemic pain vs aortitis  delta trop negative, EKG w/o KENNEY, +STD in I/AVL with TWI in V4-V6, no prior EKGs to compare to  ( not in chart )   possibly 2/2 aortitis 2/2 recent reduction in steroids  CTA showing no dissection or aneurysm  - rheum appreciated : pain is different from her vasculitic pain .. cont steroids   - cardiology input noted :  CT coronaries .. will dc if cleared by cardio   - monitor on tele     Problem/Plan - 2:  ·  Problem: Pain of back and right lower extremity.   ·  Plan: back pain radiating down RLE with paresthesias x 1 month, likely sciatica   - CT lumbar : no acuet patholgy   - pain control with tylenol and oxycodone for now.    Problem/Plan - 3:  ·  Problem: Abnormal CT scan.   ·  Plan: CT showing possible fat necrosis in bilateral breast soft tissue with indeterminate nodular focus in the left breast soft tissue  - outpt dedicated breast imaging - US vs mammogram vs CT.    Problem/Plan - 4:  ·  Problem: Aortitis.   ·  Plan: h/o aortitis with intermittent episodes of CP   rheumatology consult in AM   c/w prednisone 145mg daily for now.    Problem/Plan - 5:  ·  Problem: TB lung, latent.   ·  Plan: c/w rifampin.    Problem/Plan - 6:  ·  Problem: GERD (gastroesophageal reflux disease).   ·  Plan: c/w PPI.
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    Problem/Plan - 1:  ·  Problem: Chest pain.   ·  Plan: ddx includes ischemic pain vs aortitis  delta trop negative, EKG w/o KENNEY, +STD in I/AVL with TWI in V4-V6, no prior EKGs to compare to  ( not in chart )   possibly 2/2 aortitis 2/2 recent reduction in steroids  CTA showing no dissection or aneurysm  - rheum appreciated : pain is different from her vasculitic pain .. cont steroids   - cardiology input noted :  CT coronaries   - monitor on tele     Problem/Plan - 2:  ·  Problem: Pain of back and right lower extremity.   ·  Plan: back pain radiating down RLE with paresthesias x 1 month, likely sciatica   - CT lumbar : no acuet patholgy   - pain control with tylenol and oxycodone for now.    Problem/Plan - 3:  ·  Problem: Abnormal CT scan.   ·  Plan: CT showing possible fat necrosis in bilateral breast soft tissue with indeterminate nodular focus in the left breast soft tissue  - outpt dedicated breast imaging - US vs mammogram vs CT.    Problem/Plan - 4:  ·  Problem: Aortitis.   ·  Plan: h/o aortitis with intermittent episodes of CP   rheumatology consult in AM   c/w prednisone 145mg daily for now.    Problem/Plan - 5:  ·  Problem: TB lung, latent.   ·  Plan: c/w rifampin.    Problem/Plan - 6:  ·  Problem: GERD (gastroesophageal reflux disease).   ·  Plan: c/w PPI.

## 2021-09-10 NOTE — DISCHARGE NOTE PROVIDER - CARE PROVIDER_API CALL
Rolly Beard)  Family Medicine  [address], [address] [place] [address]  Phone: [phone]  Fax: [phone]  Follow Up Time: 1 week    Wilson Zepeda)  Cardiology  [address] [facility] [address] [facility], [address]  [place] [address]  Phone: [phone]  Fax: [phone]  Follow Up Time:

## 2021-09-10 NOTE — DISCHARGE NOTE NURSING/CASE MANAGEMENT/SOCIAL WORK - PATIENT PORTAL LINK FT
You can access the FollowMyHealth Patient Portal offered by [facility] by registering at the following website: [email or web address]. By joining [facility]’s FollowMyHealth portal, you will also be able to view your health information using other applications (apps) compatible with our system.

## 2021-09-10 NOTE — CHART NOTE - NSCHARTNOTEFT_GEN_A_CORE
Echo wnl.  No further CP reported by pt.   ESR and CRP mildly elevated.   CT coronary pending.   At this time we advise continuing Cardiac w/u.  Continue prednisone 15 mg daily.   We also recommend inpatient/outpateint w/u for possible GI causes of CP if cardiac w/u is negative.   If cardiac w/u is negative for cause of CP, we will consider outpatient PET scan to see if there are signs of vasculitis.     DW with Dr. Lynette Monaco MD  Rheum fellow PGY 5  Pager [phone]
CT coronaries reviewed by cardiology. NO cardiology objection for discharge.   L-spine CT showed spinal canal stenosis, L5-S1 Mild to moderate right neural foraminal narrowing. She denies incontinent bowel / bladder episodes, or leg weakness.   Explained pt to follow up with her primary care physician for further evaluation including MRI of spine.   Also she is aware of abnormal CTA results, recommended to have dedicated breast image with her breast surgeon  Medically cleared for discharge by Dr. Delgado.   Pt to follow up with PCP, cardiology, rheumatology and breast surgeon     Kaela Klein NP-C  #[phone]

## 2021-09-10 NOTE — PROGRESS NOTE ADULT - SUBJECTIVE AND OBJECTIVE BOX
CARDIOLOGY FOLLOW UP - Dr. Zepeda  DATE OF SERVICE: 09-10-21 @ 09:57    CC no cp/sob     REVIEW OF SYSTEMS:   CONSTITUTIONAL: No fever, weight loss, or fatigue   RESPIRATORY:  No cough, wheezing, chills or hemoptysis; No SOB  CARDIOVASCULAR: No chest pain, palpitations, passing out, dizziness, or leg swelling   GASTROINTESTINAL: No abdominal or epigastric pain. No nausea, vomiting, or hematemesis, no diarreha, or constipation, No melena or hematochezia   VASCULAR: no edema.       PHYSICAL EXAM:  T(C): 36.5 (09-10-21 @ 04:49), Max: 37 (09-09-21 @ 19:39)  HR: 71 (09-10-21 @ 04:49) (67 - 73)  BP: 131/82 (09-10-21 @ 04:49) (121/76 - 136/69)  RR: 18 (09-10-21 @ 04:49) (16 - 18)  SpO2: 98% (09-10-21 @ 04:49) (97% - 98%)  Wt(kg): --  I&O's Summary      Appearance: Normal	  Cardiovascular: Normal S1 S2,RRR, No JVD, No murmurs  Respiratory: Lungs clear to auscultation	  Gastrointestinal:  Soft, Non-tender, + BS	  Extremities: Normal range of motion, No clubbing, cyanosis or edema      HOME MEDICATIONS:  Lipitor 20 mg oral tablet: 1 tab(s) orally once a day (08 Sep 2021 23:52)  metoprolol succinate 25 mg oral capsule, extended release: 0.5 cap(s) orally once a day (08 Sep 2021 23:52)  omeprazole 40 mg oral delayed release capsule: 1 cap(s) orally once a day (08 Sep 2021 23:52)  predniSONE 10 mg oral tablet: 1.5 tab(s) orally once a day (08 Sep 2021 23:52)  rifAMPin 300 mg oral capsule: 2 cap(s) orally once a day (08 Sep 2021 23:52)  Valtrex 500 mg oral tablet: 1 tab(s) orally once a day (08 Sep 2021 23:52)      MEDICATIONS  (STANDING):  atorvastatin 20 milliGRAM(s) Oral at bedtime  enoxaparin Injectable 40 milliGRAM(s) SubCutaneous daily  influenza   Vaccine 0.5 milliLiter(s) IntraMuscular once  metoprolol succinate ER 12.5 milliGRAM(s) Oral daily  pantoprazole    Tablet 40 milliGRAM(s) Oral before breakfast  predniSONE   Tablet 15 milliGRAM(s) Oral daily  rifAMPin 600 milliGRAM(s) Oral daily  valACYclovir 500 milliGRAM(s) Oral daily      TELEMETRY: NSR 	    ECG:  	  RADIOLOGY:   DIAGNOSTIC TESTING:  [ ] Echocardiogram:  [ ]  Catheterization:  [ ] Stress Test:    OTHER: 	    LABS:	 	                                13.7   8.41  )-----------( 260      ( 10 Sep 2021 06:43 )             42.2     09-10    142  |  106  |  13  ----------------------------<  96  3.9   |  24  |  0.56    Ca    9.1      10 Sep 2021 06:43    TPro  x   /  Alb  x   /  TBili  0.2  /  DBili  x   /  AST  x   /  ALT  x   /  AlkPhos  x   09-10    PT/INR - ( 10 Sep 2021 06:43 )   PT: 12.6 sec;   INR: 1.05 ratio         PTT - ( 08 Sep 2021 16:48 )  PTT:33.8 sec    
Date of service: 09-09-21 @ 22:56      Patient is a 61y old  Female who presents with a chief complaint of CP (09 Sep 2021 17:54)                                                               INTERVAL HPI/OVERNIGHT EVENTS:    REVIEW OF SYSTEMS:     CONSTITUTIONAL: No weakness, fevers or chills  RESPIRATORY: No cough, wheezing,  No shortness of breath  CARDIOVASCULAR: r chest pain or palpitations  GASTROINTESTINAL: No abdominal pain  . No nausea, vomiting, or hematemesis; No diarrhea or constipation. No melena or hematochezia.  GENITOURINARY: No dysuria, frequency or hematuria  NEUROLOGICAL: No numbness or weakness                                                                                                                                                                                                                                                                                 Medications:  MEDICATIONS  (STANDING):  atorvastatin 20 milliGRAM(s) Oral at bedtime  enoxaparin Injectable 40 milliGRAM(s) SubCutaneous daily  influenza   Vaccine 0.5 milliLiter(s) IntraMuscular once  metoprolol succinate ER 12.5 milliGRAM(s) Oral daily  pantoprazole    Tablet 40 milliGRAM(s) Oral before breakfast  predniSONE   Tablet 15 milliGRAM(s) Oral daily  rifAMPin 600 milliGRAM(s) Oral daily  valACYclovir 500 milliGRAM(s) Oral daily    MEDICATIONS  (PRN):  acetaminophen   Tablet .. 650 milliGRAM(s) Oral every 6 hours PRN Temp greater or equal to 38.5C (101.3F), Mild Pain (1 - 3)  ondansetron Injectable 4 milliGRAM(s) IV Push every 8 hours PRN Nausea and/or Vomiting  oxyCODONE    IR 5 milliGRAM(s) Oral every 6 hours PRN Moderate Pain (4 - 6)       Allergies    No Known Allergies    Intolerances      Vital Signs Last 24 Hrs  T(C): 37 (09 Sep 2021 19:39), Max: 37 (09 Sep 2021 19:39)  T(F): 98.6 (09 Sep 2021 19:39), Max: 98.6 (09 Sep 2021 19:39)  HR: 73 (09 Sep 2021 19:39) (67 - 84)  BP: 136/69 (09 Sep 2021 19:39) (112/63 - 136/69)  BP(mean): --  RR: 16 (09 Sep 2021 19:39) (16 - 18)  SpO2: 98% (09 Sep 2021 19:39) (95% - 98%)  CAPILLARY BLOOD GLUCOSE          Physical Exam:    Daily Height in cm: 167.64 (09 Sep 2021 19:39)    Daily   General:  Well appearing, NAD, not cachetic  HEENT:  Nonicteric, PERRLA  CV:  RRR, S1S2   Lungs:  CTA B/L, no wheezes, rales, rhonchi  poinbt tenderness upon palpation in L Lower CC. junction   Abdomen:  Soft, non-tender, no distended, positive BS  Extremities:  2+ pulses, no c/c, no edema  Skin:  Warm and dry, no rashes  :  No martinez  Neuro:  AAOx3, non-focal, grossly intact                                                                                                                                                                                                                                                                                                LABS:                               14.0   10.53 )-----------( 299      ( 08 Sep 2021 16:48 )             43.5                      09-08    142  |  104  |  10  ----------------------------<  96  4.3   |  20<L>  |  0.56    Ca    9.8      08 Sep 2021 16:48    TPro  6.9  /  Alb  4.3  /  TBili  0.6  /  DBili  x   /  AST  25  /  ALT  18  /  AlkPhos  65  09-08                       RADIOLOGY & ADDITIONAL TESTS         I personally reviewed: [  ]EKG   [  ]CXR    [  ] CT      A/P:         Discussed with :     Marianna consultants' Notes   Time spent :  
Date of service: 09-10-21 @ 13:56      Patient is a 61y old  Female who presents with a chief complaint of CP (10 Sep 2021 09:57)                                                               INTERVAL HPI/OVERNIGHT EVENTS:    REVIEW OF SYSTEMS:     CONSTITUTIONAL: No weakness, fevers or chills  EYES/ENT: No visual changes , no ear ache   NECK: No pain or stiffness  RESPIRATORY: No cough, wheezing,  No shortness of breath  CARDIOVASCULAR: No chest pain or palpitations  GASTROINTESTINAL: No abdominal pain  . No nausea, vomiting, or hematemesis; No diarrhea or constipation. No melena or hematochezia.  GENITOURINARY: No dysuria, frequency or hematuria  NEUROLOGICAL: No numbness or weakness  SKIN: No itching, burning, rashes, or lesions                                                                                                                                                                                                                                                                                 Medications:  MEDICATIONS  (STANDING):  atorvastatin 20 milliGRAM(s) Oral at bedtime  enoxaparin Injectable 40 milliGRAM(s) SubCutaneous daily  influenza   Vaccine 0.5 milliLiter(s) IntraMuscular once  metoprolol succinate ER 12.5 milliGRAM(s) Oral daily  pantoprazole    Tablet 40 milliGRAM(s) Oral before breakfast  predniSONE   Tablet 15 milliGRAM(s) Oral daily  rifAMPin 600 milliGRAM(s) Oral daily  valACYclovir 500 milliGRAM(s) Oral daily    MEDICATIONS  (PRN):  acetaminophen   Tablet .. 650 milliGRAM(s) Oral every 6 hours PRN Temp greater or equal to 38.5C (101.3F), Mild Pain (1 - 3)  ondansetron Injectable 4 milliGRAM(s) IV Push every 8 hours PRN Nausea and/or Vomiting  oxyCODONE    IR 5 milliGRAM(s) Oral every 6 hours PRN Moderate Pain (4 - 6)       Allergies    No Known Allergies    Intolerances      Vital Signs Last 24 Hrs  T(C): 37.1 (10 Sep 2021 11:21), Max: 37.1 (10 Sep 2021 11:21)  T(F): 98.8 (10 Sep 2021 11:21), Max: 98.8 (10 Sep 2021 11:21)  HR: 79 (10 Sep 2021 11:21) (71 - 79)  BP: 115/77 (10 Sep 2021 11:21) (115/77 - 136/69)  BP(mean): --  RR: 18 (10 Sep 2021 11:21) (16 - 18)  SpO2: 98% (10 Sep 2021 11:21) (97% - 98%)  CAPILLARY BLOOD GLUCOSE          09-10 @ 07:01  -  09-10 @ 13:56  --------------------------------------------------------  IN: 600 mL / OUT: 0 mL / NET: 600 mL      Physical Exam:    Daily Height in cm: 167.64 (09 Sep 2021 19:39)    Daily   General:  Well appearing, NAD, not cachetic  HEENT:  Nonicteric, PERRLA  CV:  RRR, S1S2   Lungs:  CTA B/L, no wheezes, rales, rhonchi  Abdomen:  Soft, non-tender, no distended, positive BS  Extremities:  2+ pulses, no c/c, no edema  Skin:  Warm and dry, no rashes  :  No martinez  Neuro:  AAOx3, non-focal, grossly intact                                                                                                                                                                                                                                                                                                LABS:                               13.7   8.41  )-----------( 260      ( 10 Sep 2021 06:43 )             42.2                      09-10    142  |  106  |  13  ----------------------------<  96  3.9   |  24  |  0.56    Ca    9.1      10 Sep 2021 06:43    TPro  x   /  Alb  x   /  TBili  0.2  /  DBili  x   /  AST  x   /  ALT  x   /  AlkPhos  x   09-10                       RADIOLOGY & ADDITIONAL TESTS         I personally reviewed: [  ]EKG   [  ]CXR    [  ] CT      A/P:         Discussed with :     Marianna consultants' Notes   Time spent :

## 2021-09-10 NOTE — PROGRESS NOTE ADULT - TIME BILLING
Patient seen and examined.  Agree with above NP note.  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, HCV, latent TB, SVT s/p ablation, GERD p/w CP    #Chest pain  -recurrent chest pain   -trop negative   -known aortitis   -CTA neg for acute aortic disease/dissection  -echo with normal lv/valve function  -previous cath in 2015 after moderate anterior ischemia revealed normal coronaries  -await ct coronary read from today     #Aortitis   -h/o aortitis with intermittent episodes of CP   -sx not similar to previous chest pain associated with active vasculitis  -c/w prednisone daily  -rheum f/u    #hx of SVT s/p ablation  -cont bb    dvt ppx      if ct cors without any obs CAD no CV CI to d/c planning

## 2021-09-10 NOTE — DISCHARGE NOTE PROVIDER - NSDCCPCAREPLAN_GEN_ALL_CORE_FT
PRINCIPAL DISCHARGE DIAGNOSIS  Diagnosis: Chest pain  Assessment and Plan of Treatment: You were evaluated by rheumatologist and cardiologist   Cardiac enzymes negative   CTA showed no dissection  BNP elevated, no decomp hf noted on exam  Echocardiogram with normal LV function. EF 55%   s/p CT coronaries   Approximate 20% narrowing in the ostium of the RCA from noncalcified plaque. The remainder of the coronaries are unremarkable.  Mild diffuse thickening of the myocardium suggesting LV hypertrophy, measuring up to 16 mm.  NONCARDIAC FINDINGS: Minimal thickening of the ascending and descending thoracic aorta in keeping with known vasculitis  Continue current medications as directed   Please follow up with your cardiologist, rheumatologist and primary care physician         SECONDARY DISCHARGE DIAGNOSES  Diagnosis: Abnormal CT scan  Assessment and Plan of Treatment: Abnormal CT scan.   - CT showing possible fat necrosis in bilateral breast soft tissue with indeterminate nodular focus in the left breast soft tissue  - Please follow up with your physician for dedicated breast imaging - US vs mammogram vs CT.    Diagnosis: Aortitis  Assessment and Plan of Treatment: Continue Prednisone  aS directed  Please follow up with your Rheumatologist    Diagnosis: Pain of back and right lower extremity  Assessment and Plan of Treatment: back pain radiating down RLE with paresthesias x 1 month,   CT lumbar spine : no acuet patholgy, continue pain management with Tylenol  --CT of L- spine --  No acute fracture or traumatic subluxation.  L4-L5 disc bulge and bilateral ligamentous hypertrophy resulting in moderate spinal canal stenosis and bilateral lateral recess stenosis.  L5-S1 broad-based disc protrusion asymmetric to the right resulting in moderate right-sided spinal canal stenosis with posterior displacement of the descending right S1 nerve root. Mild to moderate right neural foraminal narrowing at this level.  Correlation with MRI of the lumbar spine may be obtained for further evaluation.  please follow up with your doctor for further evaluation including MRI        PRINCIPAL DISCHARGE DIAGNOSIS  Diagnosis: Chest pain  Assessment and Plan of Treatment: You were evaluated by rheumatologist and cardiologist   Cardiac enzymes negative   CTA showed no dissection  BNP elevated, no decomp hf noted on exam  Echocardiogram with normal LV function. EF 55%   s/p CT coronaries   Approximate 20% narrowing in the ostium of the RCA from noncalcified plaque. The remainder of the coronaries are unremarkable.  Mild diffuse thickening of the myocardium suggesting LV hypertrophy, measuring up to 16 mm.  NONCARDIAC FINDINGS: Minimal thickening of the ascending and descending thoracic aorta in keeping with known vasculitis  Continue current medications as directed   Please follow up with your cardiologist, rheumatologist and primary care physician         SECONDARY DISCHARGE DIAGNOSES  Diagnosis: Abnormal CT scan  Assessment and Plan of Treatment: Abnormal CT scan.   - CT showing possible fat necrosis in bilateral breast soft tissue with indeterminate nodular focus in the left breast soft tissue  - Please follow up with your breast surgeon for dedicated breast imaging - US vs mammogram vs CT.    Diagnosis: Aortitis  Assessment and Plan of Treatment: Continue Prednisone  aS directed  Please follow up with your Rheumatologist    Diagnosis: Pain of back and right lower extremity  Assessment and Plan of Treatment: back pain radiating down RLE with paresthesias x 1 month,   CT lumbar spine : no acuet patholgy,    No incontinent episode or leg weakness   continue pain management with Tylenol  Please follow up with your doctor for further evaluation including MRI   --CT of L- spine --  No acute fracture or traumatic subluxation.  L4-L5 disc bulge and bilateral ligamentous hypertrophy resulting in moderate spinal canal stenosis and bilateral lateral recess stenosis.  L5-S1 broad-based disc protrusion asymmetric to the right resulting in moderate right-sided spinal canal stenosis with posterior displacement of the descending right S1 nerve root. Mild to moderate right neural foraminal narrowing at this level.  Correlation with MRI of the lumbar spine may be obtained for further evaluation.

## 2021-09-14 ENCOUNTER — NON-APPOINTMENT (OUTPATIENT)
Age: 61
End: 2021-09-14

## 2021-09-17 ENCOUNTER — APPOINTMENT (OUTPATIENT)
Dept: RHEUMATOLOGY | Facility: CLINIC | Age: 61
End: 2021-09-17
Payer: COMMERCIAL

## 2021-09-17 ENCOUNTER — APPOINTMENT (OUTPATIENT)
Dept: INFECTIOUS DISEASE | Facility: CLINIC | Age: 61
End: 2021-09-17
Payer: COMMERCIAL

## 2021-09-17 VITALS
HEART RATE: 100 BPM | OXYGEN SATURATION: 98 % | TEMPERATURE: 97.3 F | HEIGHT: 66 IN | DIASTOLIC BLOOD PRESSURE: 89 MMHG | BODY MASS INDEX: 23.63 KG/M2 | WEIGHT: 147 LBS | SYSTOLIC BLOOD PRESSURE: 135 MMHG

## 2021-09-17 VITALS
DIASTOLIC BLOOD PRESSURE: 78 MMHG | HEART RATE: 95 BPM | SYSTOLIC BLOOD PRESSURE: 128 MMHG | OXYGEN SATURATION: 99 % | RESPIRATION RATE: 18 BRPM | TEMPERATURE: 98.7 F | HEIGHT: 66 IN

## 2021-09-17 DIAGNOSIS — M51.16 INTERVERTEBRAL DISC DISORDERS WITH RADICULOPATHY, LUMBAR REGION: ICD-10-CM

## 2021-09-17 DIAGNOSIS — N63.0 UNSPECIFIED LUMP IN UNSPECIFIED BREAST: ICD-10-CM

## 2021-09-17 PROCEDURE — 99215 OFFICE O/P EST HI 40 MIN: CPT

## 2021-09-17 PROCEDURE — 99213 OFFICE O/P EST LOW 20 MIN: CPT

## 2021-09-17 NOTE — PHYSICAL EXAM
[General Appearance - Alert] : alert [General Appearance - In No Acute Distress] : in no acute distress [Sclera] : the sclera and conjunctiva were normal [PERRL With Normal Accommodation] : pupils were equal in size, round, reactive to light [Extraocular Movements] : extraocular movements were intact [Outer Ear] : the ears and nose were normal in appearance [Oropharynx] : the oropharynx was normal with no thrush [Neck Appearance] : the appearance of the neck was normal [Neck Cervical Mass (___cm)] : no neck mass was observed [Jugular Venous Distention Increased] : there was no jugular-venous distention [] : no respiratory distress [Auscultation Breath Sounds / Voice Sounds] : lungs were clear to auscultation bilaterally [Heart Rate And Rhythm] : heart rate was normal and rhythm regular [Bowel Sounds] : normal bowel sounds [Abdomen Soft] : soft [Cervical Lymph Nodes Enlarged Posterior Bilaterally] : posterior cervical [Cervical Lymph Nodes Enlarged Anterior Bilaterally] : anterior cervical [Supraclavicular Lymph Nodes Enlarged Bilaterally] : supraclavicular [Skin Lesions] : no skin lesions [No Focal Deficits] : no focal deficits [Affect] : the affect was normal [Oriented To Time, Place, And Person] : oriented to person, place, and time

## 2021-09-17 NOTE — CONSULT LETTER
[Dear  ___] : Dear  [unfilled], [Consult Letter:] : I had the pleasure of evaluating your patient, [unfilled]. [Please see my note below.] : Please see my note below. [Consult Closing:] : Thank you very much for allowing me to participate in the care of this patient.  If you have any questions, please do not hesitate to contact me. [Sincerely,] : Sincerely, [FreeTextEntry2] : Dr. Neva Patino [FreeTextEntry3] : \par Valery Thibodeaux MD\par  of Medicine\par Division of Infectious Diseases\par [employer] [facility]\par [address] [place] [address] DrJyoti\[place] [address]\par Tel: [phone]\par Fax: [phone]

## 2021-09-17 NOTE — HISTORY OF PRESENT ILLNESS
[FreeTextEntry1] : This is a 60 yo F with h/o aortitis, MGUS, prior Hep C (s/p treatment >6 years ago ~2015 and cured), presents today for f/up of latent TB with positive quantiferon gold.\par \par Born in [place].\par Moved to  1999.\par Never knew of a positive TB test in the past.\par Never treated in the past.\par No known exposures.\par Works as .\par Lives in [place]\par \par \par No cough, sob, enlarged lymph nodes, fevers. Does get some sweats since post menopausal. No hemoptysis.\par \par Had chest xray 1/28/21 which was clear. \par \par Also notes h/o Hep C, s/p treatment ~2015, LFTs normal now. \par \par Now on Prednisone taper.  Started On 30 mg for aortitis.  Now at 12.5 mg po daily.    To taper down every 2 weeks by 2.5 mg.\par \par Started INH and B6 5/3/2021.  Developed muscle aches and elevated LFTs.  Drug stopped 7/16/21.  \par Started Rifampin 600 mg po daily 9/1/21.\par Will be traveling to [facility] to visit family for 6 weeks. \par \par \par MRI chest done 8/20/21: Patent central airways.  No nodules. No effusion. \par Given omeprazole for GERD.\par Had chest pain and was hospitalized at [facility]. Unclear dx. Recovered. CT with dye and was not heart related. Not related to aortitis.  LFTs checked and were normal as well on 9/8 and 9/10/21. \par

## 2021-09-18 LAB
ALBUMIN SERPL ELPH-MCNC: 4.5 G/DL
ALP BLD-CCNC: 70 U/L
ALT SERPL-CCNC: 16 U/L
ANION GAP SERPL CALC-SCNC: 16 MMOL/L
APPEARANCE: CLEAR
AST SERPL-CCNC: 19 U/L
BACTERIA: NEGATIVE
BASOPHILS # BLD AUTO: 0.03 K/UL
BASOPHILS NFR BLD AUTO: 0.3 %
BILIRUB SERPL-MCNC: 0.4 MG/DL
BILIRUBIN URINE: NEGATIVE
BLOOD URINE: NEGATIVE
BUN SERPL-MCNC: 7 MG/DL
CALCIUM SERPL-MCNC: 9.2 MG/DL
CHLORIDE SERPL-SCNC: 103 MMOL/L
CO2 SERPL-SCNC: 24 MMOL/L
COLOR: ABNORMAL
CREAT SERPL-MCNC: 0.55 MG/DL
CRP SERPL-MCNC: 28 MG/L
EOSINOPHIL # BLD AUTO: 0.03 K/UL
EOSINOPHIL NFR BLD AUTO: 0.3 %
GLUCOSE QUALITATIVE U: NEGATIVE
GLUCOSE SERPL-MCNC: 106 MG/DL
HCT VFR BLD CALC: 39.6 %
HGB BLD-MCNC: 13.1 G/DL
HYALINE CASTS: 2 /LPF
IMM GRANULOCYTES NFR BLD AUTO: 0.3 %
KETONES URINE: NEGATIVE
LDH SERPL-CCNC: 230 U/L
LEUKOCYTE ESTERASE URINE: NEGATIVE
LYMPHOCYTES # BLD AUTO: 1.09 K/UL
LYMPHOCYTES NFR BLD AUTO: 12.3 %
MAN DIFF?: NORMAL
MCHC RBC-ENTMCNC: 30.7 PG
MCHC RBC-ENTMCNC: 33.1 GM/DL
MCV RBC AUTO: 92.7 FL
MICROSCOPIC-UA: NORMAL
MONOCYTES # BLD AUTO: 0.5 K/UL
MONOCYTES NFR BLD AUTO: 5.6 %
NEUTROPHILS # BLD AUTO: 7.21 K/UL
NEUTROPHILS NFR BLD AUTO: 81.2 %
NITRITE URINE: NEGATIVE
NT-PROBNP SERPL-MCNC: 1771 PG/ML
PH URINE: 7
PLATELET # BLD AUTO: 323 K/UL
POTASSIUM SERPL-SCNC: 4.7 MMOL/L
PROT SERPL-MCNC: 6.4 G/DL
PROTEIN URINE: NEGATIVE
RBC # BLD: 4.27 M/UL
RBC # FLD: 13 %
RED BLOOD CELLS URINE: 3 /HPF
SODIUM SERPL-SCNC: 143 MMOL/L
SPECIFIC GRAVITY URINE: 1.01
SQUAMOUS EPITHELIAL CELLS: 1 /HPF
UROBILINOGEN URINE: NORMAL
WBC # FLD AUTO: 8.89 K/UL
WHITE BLOOD CELLS URINE: 0 /HPF

## 2021-09-20 ENCOUNTER — RX RENEWAL (OUTPATIENT)
Age: 61
End: 2021-09-20

## 2021-09-21 LAB — IL6 SERPL-MCNC: 7.9 PG/ML

## 2021-09-21 NOTE — DISCHARGE NOTE PROVIDER - NSDCMRMEDTOKEN_GEN_ALL_CORE_FT
26.3 Lipitor 20 mg oral tablet: 1 tab(s) orally once a day  metoprolol succinate 25 mg oral capsule, extended release: 0.5 cap(s) orally once a day  omeprazole 40 mg oral delayed release capsule: 1 cap(s) orally once a day  predniSONE 10 mg oral tablet: 1.5 tab(s) orally once a day  rifAMPin 300 mg oral capsule: 2 cap(s) orally once a day  Valtrex 500 mg oral tablet: 1 tab(s) orally once a day

## 2021-09-22 LAB
IGG SUBSET TOTAL IGG: 533 MG/DL
IGG1 SER-MCNC: 336 MG/DL
IGG2 SER-MCNC: 67 MG/DL
IGG3 SER-MCNC: 35 MG/DL
IGG4 SER-MCNC: 2 MG/DL

## 2021-10-20 ENCOUNTER — APPOINTMENT (OUTPATIENT)
Dept: RHEUMATOLOGY | Facility: CLINIC | Age: 61
End: 2021-10-20

## 2021-11-09 PROBLEM — I77.6 ARTERITIS, UNSPECIFIED: Chronic | Status: ACTIVE | Noted: 2021-09-08

## 2021-11-09 PROBLEM — K21.9 GASTRO-ESOPHAGEAL REFLUX DISEASE WITHOUT ESOPHAGITIS: Chronic | Status: ACTIVE | Noted: 2021-09-09

## 2021-11-09 PROBLEM — Z22.7 LATENT TUBERCULOSIS: Chronic | Status: ACTIVE | Noted: 2021-09-09

## 2021-11-09 PROBLEM — B19.20 UNSPECIFIED VIRAL HEPATITIS C WITHOUT HEPATIC COMA: Chronic | Status: ACTIVE | Noted: 2021-09-09

## 2021-11-23 ENCOUNTER — APPOINTMENT (OUTPATIENT)
Dept: RHEUMATOLOGY | Facility: CLINIC | Age: 61
End: 2021-11-23
Payer: COMMERCIAL

## 2021-11-23 VITALS
HEIGHT: 66 IN | TEMPERATURE: 97.2 F | SYSTOLIC BLOOD PRESSURE: 117 MMHG | WEIGHT: 147 LBS | OXYGEN SATURATION: 98 % | BODY MASS INDEX: 23.63 KG/M2 | DIASTOLIC BLOOD PRESSURE: 89 MMHG | HEART RATE: 103 BPM

## 2021-11-23 PROCEDURE — 99214 OFFICE O/P EST MOD 30 MIN: CPT | Mod: 25

## 2021-11-23 PROCEDURE — 90472 IMMUNIZATION ADMIN EACH ADD: CPT

## 2021-11-23 PROCEDURE — 90670 PCV13 VACCINE IM: CPT

## 2021-11-23 PROCEDURE — G0009: CPT

## 2021-11-23 PROCEDURE — 90686 IIV4 VACC NO PRSV 0.5 ML IM: CPT

## 2021-11-24 LAB
ALBUMIN SERPL ELPH-MCNC: 4.5 G/DL
ALP BLD-CCNC: 93 U/L
ALT SERPL-CCNC: 13 U/L
ANION GAP SERPL CALC-SCNC: 13 MMOL/L
AST SERPL-CCNC: 17 U/L
BASOPHILS # BLD AUTO: 0.03 K/UL
BASOPHILS NFR BLD AUTO: 0.3 %
BILIRUB SERPL-MCNC: 0.5 MG/DL
BUN SERPL-MCNC: 10 MG/DL
CALCIUM SERPL-MCNC: 9.6 MG/DL
CHLORIDE SERPL-SCNC: 104 MMOL/L
CO2 SERPL-SCNC: 26 MMOL/L
COVID-19 SPIKE DOMAIN ANTIBODY INTERPRETATION: POSITIVE
CREAT SERPL-MCNC: 0.5 MG/DL
CRP SERPL-MCNC: 44 MG/L
DEPRECATED KAPPA LC FREE/LAMBDA SER: 0.07 RATIO
EOSINOPHIL # BLD AUTO: 0.04 K/UL
EOSINOPHIL NFR BLD AUTO: 0.4 %
ERYTHROCYTE [SEDIMENTATION RATE] IN BLOOD BY WESTERGREN METHOD: 53 MM/HR
GLUCOSE SERPL-MCNC: 102 MG/DL
HCT VFR BLD CALC: 40.4 %
HGB BLD-MCNC: 12.7 G/DL
IGA SER QL IEP: 107 MG/DL
IGG SER QL IEP: 731 MG/DL
IGM SER QL IEP: 24 MG/DL
IMM GRANULOCYTES NFR BLD AUTO: 0.3 %
KAPPA LC CSF-MCNC: 13.96 MG/DL
KAPPA LC SERPL-MCNC: 0.95 MG/DL
LYMPHOCYTES # BLD AUTO: 1.71 K/UL
LYMPHOCYTES NFR BLD AUTO: 19.2 %
MAN DIFF?: NORMAL
MCHC RBC-ENTMCNC: 29.5 PG
MCHC RBC-ENTMCNC: 31.4 GM/DL
MCV RBC AUTO: 94 FL
MONOCYTES # BLD AUTO: 0.73 K/UL
MONOCYTES NFR BLD AUTO: 8.2 %
NEUTROPHILS # BLD AUTO: 6.36 K/UL
NEUTROPHILS NFR BLD AUTO: 71.6 %
NT-PROBNP SERPL-MCNC: 1196 PG/ML
PLATELET # BLD AUTO: 345 K/UL
POTASSIUM SERPL-SCNC: 5 MMOL/L
PROT SERPL-MCNC: 6.8 G/DL
RBC # BLD: 4.3 M/UL
RBC # FLD: 13.2 %
SARS-COV-2 AB SERPL IA-ACNC: 1.51 U/ML
SODIUM SERPL-SCNC: 143 MMOL/L
WBC # FLD AUTO: 8.9 K/UL

## 2021-11-26 LAB
IGG SUBSET TOTAL IGG: 698 MG/DL
IGG1 SER-MCNC: 463 MG/DL
IGG2 SER-MCNC: 82 MG/DL
IGG3 SER-MCNC: 47 MG/DL
IGG4 SER-MCNC: 6 MG/DL

## 2021-12-21 ENCOUNTER — APPOINTMENT (OUTPATIENT)
Dept: INFECTIOUS DISEASE | Facility: CLINIC | Age: 61
End: 2021-12-21
Payer: COMMERCIAL

## 2021-12-21 VITALS
OXYGEN SATURATION: 99 % | WEIGHT: 149 LBS | RESPIRATION RATE: 18 BRPM | DIASTOLIC BLOOD PRESSURE: 74 MMHG | BODY MASS INDEX: 23.95 KG/M2 | HEIGHT: 66 IN | TEMPERATURE: 99 F | SYSTOLIC BLOOD PRESSURE: 141 MMHG

## 2021-12-21 VITALS
RESPIRATION RATE: 18 BRPM | HEART RATE: 90 BPM | HEIGHT: 66 IN | OXYGEN SATURATION: 99 % | TEMPERATURE: 99 F | SYSTOLIC BLOOD PRESSURE: 141 MMHG | DIASTOLIC BLOOD PRESSURE: 74 MMHG | BODY MASS INDEX: 23.95 KG/M2 | WEIGHT: 149 LBS

## 2021-12-21 PROCEDURE — 99213 OFFICE O/P EST LOW 20 MIN: CPT

## 2021-12-21 NOTE — CONSULT LETTER
[Dear  ___] : Dear  [unfilled], [Consult Letter:] : I had the pleasure of evaluating your patient, [unfilled]. [Please see my note below.] : Please see my note below. [Consult Closing:] : Thank you very much for allowing me to participate in the care of this patient.  If you have any questions, please do not hesitate to contact me. [Sincerely,] : Sincerely, [FreeTextEntry2] : Dr. Neva Patino [FreeTextEntry3] : \par Valery Thibodeaux MD\par  of Medicine\par Division of Infectious Diseases\par [facility] [employer] [facility] [employer] at [facility]\par [address] [place] [address] DrJyoti\[place] [address]\par Tel: [phone]\par Fax: [phone]

## 2021-12-21 NOTE — HISTORY OF PRESENT ILLNESS
[FreeTextEntry1] : This is a 62 yo F with h/o aortitis, MGUS, prior Hep C (s/p treatment >6 years ago ~2015 and cured), presents today for f/up of latent TB with positive quantiferon gold.\par \par Born in [place].\par Moved to [place].\par Never knew of a positive TB test in the past.\par Never treated in the past.\par No known exposures.\par Works as .\par Lives in [place]\par \par \par No cough, sob, enlarged lymph nodes, fevers. Does get some sweats since post menopausal. No hemoptysis.\par \par Had chest xray 1/28/21 which was clear. \par \par Also notes h/o Hep C, s/p treatment ~2015, LFTs normal now. \par \par Now on Prednisone taper.  Started On 30 mg for aortitis.  Now at 5 mg  po daily.    Has f/up with rheum today 12/21/21.\par \par Started INH and B6 5/3/2021.  Developed muscle aches and elevated LFTs.  Drug stopped 7/16/21.  \par Started Rifampin 600 mg po daily 9/1/21.\par Traveled  to [place] to visit family for 6 weeks.  Just returned from [place]. Covid pcr negative. \par \par MRI chest done 8/20/21: Patent central airways.  No nodules. No effusion. \par Reports she gets RLE pain. Now on gabapentin and meloxicam intermittently. Will d/w rheum today. \par

## 2021-12-21 NOTE — ASSESSMENT
Rx faxed to . Fax confirmation received. Rx hardcopy mailed to pharmacy.  emailed with upcoming refill information. [FreeTextEntry1] : This is a 60 yo F with h/o aortitis, MGUS, prior Hep C (s/p treatment >6 years ago ~2015 and cured), presents today for positive quantiferon gold found to have Latent TB\par \par Chest xray was clear 5/4/21 as was CT angio 5/20/21. No findings of TB.\par Had MRI chest 8/20/21 w/o findings of TB as well.\par \par Had to stop INH due to transaminitis and muscle aches.\par \par Continue Rifampin 600 mg po daily x 4 months.  Start date 9/1/2021. Planned end date 1/1/2022.\par Can interact with omeprazole and atorvastatin. Will monitor closely. \par \par LFTs normal now. 11/23/21.\par \par Discussed side effects of Rifampin with pt including but not limited to GI upset, elevated LFTs, bone marrow suppression, orange colored urine.  She does not drink alcohol.\par \par Pt has appt now with rheumatology. Will order CBC w/ diff and CMP to be done at rheum b/c will likely need other blood work today. \par \par She is aware to stop rifampin as of 1/1/22. \par She can return as needed. \par \par \par \par

## 2022-01-11 ENCOUNTER — APPOINTMENT (OUTPATIENT)
Dept: RHEUMATOLOGY | Facility: CLINIC | Age: 62
End: 2022-01-11
Payer: COMMERCIAL

## 2022-01-11 VITALS
SYSTOLIC BLOOD PRESSURE: 142 MMHG | WEIGHT: 145 LBS | HEIGHT: 66 IN | OXYGEN SATURATION: 97 % | HEART RATE: 98 BPM | BODY MASS INDEX: 23.3 KG/M2 | TEMPERATURE: 97.3 F | DIASTOLIC BLOOD PRESSURE: 79 MMHG

## 2022-01-11 PROCEDURE — 99214 OFFICE O/P EST MOD 30 MIN: CPT

## 2022-01-12 ENCOUNTER — TRANSCRIPTION ENCOUNTER (OUTPATIENT)
Age: 62
End: 2022-01-12

## 2022-01-12 LAB
ALBUMIN SERPL ELPH-MCNC: 4.5 G/DL
ALP BLD-CCNC: 108 U/L
ALT SERPL-CCNC: 14 U/L
ANION GAP SERPL CALC-SCNC: 14 MMOL/L
AST SERPL-CCNC: 15 U/L
BASOPHILS # BLD AUTO: 0.02 K/UL
BASOPHILS NFR BLD AUTO: 0.2 %
BILIRUB SERPL-MCNC: 0.2 MG/DL
BUN SERPL-MCNC: 8 MG/DL
CALCIUM SERPL-MCNC: 9.8 MG/DL
CHLORIDE SERPL-SCNC: 102 MMOL/L
CO2 SERPL-SCNC: 27 MMOL/L
COVID-19 SPIKE DOMAIN ANTIBODY INTERPRETATION: POSITIVE
CREAT SERPL-MCNC: 0.59 MG/DL
CRP SERPL-MCNC: 45 MG/L
EOSINOPHIL # BLD AUTO: 0.09 K/UL
EOSINOPHIL NFR BLD AUTO: 1.1 %
ERYTHROCYTE [SEDIMENTATION RATE] IN BLOOD BY WESTERGREN METHOD: 27 MM/HR
GLUCOSE SERPL-MCNC: 90 MG/DL
HCT VFR BLD CALC: 42.8 %
HGB BLD-MCNC: 13 G/DL
IMM GRANULOCYTES NFR BLD AUTO: 0.2 %
LYMPHOCYTES # BLD AUTO: 1.96 K/UL
LYMPHOCYTES NFR BLD AUTO: 24.1 %
MAN DIFF?: NORMAL
MCHC RBC-ENTMCNC: 28.1 PG
MCHC RBC-ENTMCNC: 30.4 GM/DL
MCV RBC AUTO: 92.4 FL
MONOCYTES # BLD AUTO: 0.68 K/UL
MONOCYTES NFR BLD AUTO: 8.4 %
NEUTROPHILS # BLD AUTO: 5.36 K/UL
NEUTROPHILS NFR BLD AUTO: 66 %
PLATELET # BLD AUTO: 375 K/UL
POTASSIUM SERPL-SCNC: 5.1 MMOL/L
PROT SERPL-MCNC: 7.2 G/DL
RBC # BLD: 4.63 M/UL
RBC # FLD: 12.4 %
SARS-COV-2 AB SERPL IA-ACNC: >250 U/ML
SODIUM SERPL-SCNC: 143 MMOL/L
WBC # FLD AUTO: 8.13 K/UL

## 2022-01-19 ENCOUNTER — APPOINTMENT (OUTPATIENT)
Dept: NUCLEAR MEDICINE | Facility: CLINIC | Age: 62
End: 2022-01-19
Payer: COMMERCIAL

## 2022-01-19 ENCOUNTER — OUTPATIENT (OUTPATIENT)
Dept: OUTPATIENT SERVICES | Facility: HOSPITAL | Age: 62
LOS: 1 days | End: 2022-01-19
Payer: COMMERCIAL

## 2022-01-19 DIAGNOSIS — Z98.890 OTHER SPECIFIED POSTPROCEDURAL STATES: Chronic | ICD-10-CM

## 2022-01-19 DIAGNOSIS — Z98.89 OTHER SPECIFIED POSTPROCEDURAL STATES: Chronic | ICD-10-CM

## 2022-01-19 DIAGNOSIS — G56.01 CARPAL TUNNEL SYNDROME, RIGHT UPPER LIMB: Chronic | ICD-10-CM

## 2022-01-19 DIAGNOSIS — I77.6 ARTERITIS, UNSPECIFIED: ICD-10-CM

## 2022-01-19 PROCEDURE — 78816 PET IMAGE W/CT FULL BODY: CPT

## 2022-01-19 PROCEDURE — A9552: CPT

## 2022-01-19 PROCEDURE — 78816 PET IMAGE W/CT FULL BODY: CPT | Mod: 26,PI

## 2022-01-21 ENCOUNTER — TRANSCRIPTION ENCOUNTER (OUTPATIENT)
Age: 62
End: 2022-01-21

## 2022-01-28 ENCOUNTER — TRANSCRIPTION ENCOUNTER (OUTPATIENT)
Age: 62
End: 2022-01-28

## 2022-02-02 NOTE — CONSULT NOTE ADULT - CONSULT REQUESTED DATE/TIME
[facility]  Outpatient Occupational Therapy  CANCEL/ NO SHOW NOTE    Date: 2022  Patient Name: Divya Gonzalez        MRN: [id number]    [facility] #: [id number]  : 2014  (9 y.o.)  Gender: male     No Show: 1  Canceled Appointment: 2    REASON FOR MISSED TREATMENT:    []Cancelled due to illness. []Therapist cancelled appointment  []Cancelled due to other appointment   []No show / No call. Pt called with next scheduled appointment.   []Cancelled due to transportation conflict  [x]Cancelled due to weather  []Frequency of order changed  []Patient on hold due to:   []OTHER:      Electronically signed by:   ARIE De La Cruz            Date:2022
09-Sep-2021 14:47
09-Sep-2021 17:55

## 2022-02-07 ENCOUNTER — OUTPATIENT (OUTPATIENT)
Dept: OUTPATIENT SERVICES | Facility: HOSPITAL | Age: 62
LOS: 1 days | Discharge: HOME | End: 2022-02-07

## 2022-02-07 DIAGNOSIS — G56.01 CARPAL TUNNEL SYNDROME, RIGHT UPPER LIMB: Chronic | ICD-10-CM

## 2022-02-07 DIAGNOSIS — Z98.890 OTHER SPECIFIED POSTPROCEDURAL STATES: Chronic | ICD-10-CM

## 2022-02-07 DIAGNOSIS — Z98.89 OTHER SPECIFIED POSTPROCEDURAL STATES: Chronic | ICD-10-CM

## 2022-02-07 DIAGNOSIS — D47.2 MONOCLONAL GAMMOPATHY: ICD-10-CM

## 2022-02-09 ENCOUNTER — LABORATORY RESULT (OUTPATIENT)
Age: 62
End: 2022-02-09

## 2022-02-09 ENCOUNTER — APPOINTMENT (OUTPATIENT)
Dept: HEMATOLOGY ONCOLOGY | Facility: CLINIC | Age: 62
End: 2022-02-09
Payer: COMMERCIAL

## 2022-02-09 ENCOUNTER — NON-APPOINTMENT (OUTPATIENT)
Age: 62
End: 2022-02-09

## 2022-02-09 ENCOUNTER — RESULT REVIEW (OUTPATIENT)
Age: 62
End: 2022-02-09

## 2022-02-09 VITALS
DIASTOLIC BLOOD PRESSURE: 87 MMHG | WEIGHT: 145.99 LBS | HEART RATE: 103 BPM | BODY MASS INDEX: 23.46 KG/M2 | OXYGEN SATURATION: 99 % | TEMPERATURE: 98.1 F | RESPIRATION RATE: 18 BRPM | HEIGHT: 66 IN | SYSTOLIC BLOOD PRESSURE: 139 MMHG

## 2022-02-09 LAB
BASOPHILS # BLD AUTO: 0.04 K/UL — SIGNIFICANT CHANGE UP (ref 0–0.2)
BASOPHILS NFR BLD AUTO: 0.5 % — SIGNIFICANT CHANGE UP (ref 0–2)
EOSINOPHIL # BLD AUTO: 0.1 K/UL — SIGNIFICANT CHANGE UP (ref 0–0.5)
EOSINOPHIL NFR BLD AUTO: 1.2 % — SIGNIFICANT CHANGE UP (ref 0–6)
ERYTHROCYTE [SEDIMENTATION RATE] IN BLOOD: 58 MM/HR — HIGH (ref 0–20)
HCT VFR BLD CALC: 34.7 % — SIGNIFICANT CHANGE UP (ref 34.5–45)
HGB BLD-MCNC: 11.7 G/DL — SIGNIFICANT CHANGE UP (ref 11.5–15.5)
IMM GRANULOCYTES NFR BLD AUTO: 0.4 % — SIGNIFICANT CHANGE UP (ref 0–1.5)
LYMPHOCYTES # BLD AUTO: 1.92 K/UL — SIGNIFICANT CHANGE UP (ref 1–3.3)
LYMPHOCYTES # BLD AUTO: 23.6 % — SIGNIFICANT CHANGE UP (ref 13–44)
MCHC RBC-ENTMCNC: 29 PG — SIGNIFICANT CHANGE UP (ref 27–34)
MCHC RBC-ENTMCNC: 33.7 G/DL — SIGNIFICANT CHANGE UP (ref 32–36)
MCV RBC AUTO: 86.1 FL — SIGNIFICANT CHANGE UP (ref 80–100)
MONOCYTES # BLD AUTO: 0.59 K/UL — SIGNIFICANT CHANGE UP (ref 0–0.9)
MONOCYTES NFR BLD AUTO: 7.2 % — SIGNIFICANT CHANGE UP (ref 2–14)
NEUTROPHILS # BLD AUTO: 5.46 K/UL — SIGNIFICANT CHANGE UP (ref 1.8–7.4)
NEUTROPHILS NFR BLD AUTO: 67.1 % — SIGNIFICANT CHANGE UP (ref 43–77)
NRBC # BLD: 0 /100 WBCS — SIGNIFICANT CHANGE UP (ref 0–0)
PLATELET # BLD AUTO: 344 K/UL — SIGNIFICANT CHANGE UP (ref 150–400)
RBC # BLD: 4.03 M/UL — SIGNIFICANT CHANGE UP (ref 3.8–5.2)
RBC # FLD: 12.8 % — SIGNIFICANT CHANGE UP (ref 10.3–14.5)
RETICS #: 58.5 K/UL — SIGNIFICANT CHANGE UP (ref 25–125)
RETICS/RBC NFR: 1.4 % — SIGNIFICANT CHANGE UP (ref 0.5–2.5)
WBC # BLD: 8.14 K/UL — SIGNIFICANT CHANGE UP (ref 3.8–10.5)
WBC # FLD AUTO: 8.14 K/UL — SIGNIFICANT CHANGE UP (ref 3.8–10.5)

## 2022-02-09 PROCEDURE — 99205 OFFICE O/P NEW HI 60 MIN: CPT

## 2022-02-10 ENCOUNTER — TRANSCRIPTION ENCOUNTER (OUTPATIENT)
Age: 62
End: 2022-02-10

## 2022-02-10 ENCOUNTER — APPOINTMENT (OUTPATIENT)
Dept: ORTHOPEDIC SURGERY | Facility: CLINIC | Age: 62
End: 2022-02-10
Payer: COMMERCIAL

## 2022-02-10 ENCOUNTER — APPOINTMENT (OUTPATIENT)
Dept: RHEUMATOLOGY | Facility: CLINIC | Age: 62
End: 2022-02-10
Payer: COMMERCIAL

## 2022-02-10 VITALS
SYSTOLIC BLOOD PRESSURE: 146 MMHG | DIASTOLIC BLOOD PRESSURE: 81 MMHG | TEMPERATURE: 97 F | HEIGHT: 66 IN | HEART RATE: 122 BPM | BODY MASS INDEX: 23.3 KG/M2 | WEIGHT: 145 LBS | OXYGEN SATURATION: 99 %

## 2022-02-10 DIAGNOSIS — M19.079 PRIMARY OSTEOARTHRITIS, UNSPECIFIED ANKLE AND FOOT: ICD-10-CM

## 2022-02-10 DIAGNOSIS — S99.911S UNSPECIFIED INJURY OF RIGHT ANKLE, SEQUELA: ICD-10-CM

## 2022-02-10 PROCEDURE — XXXXX: CPT | Mod: 1L

## 2022-02-10 PROCEDURE — 90732 PPSV23 VACC 2 YRS+ SUBQ/IM: CPT

## 2022-02-10 PROCEDURE — G0009: CPT

## 2022-02-10 PROCEDURE — 99202 OFFICE O/P NEW SF 15 MIN: CPT | Mod: 1L

## 2022-02-10 PROCEDURE — 99215 OFFICE O/P EST HI 40 MIN: CPT | Mod: 25

## 2022-02-11 ENCOUNTER — APPOINTMENT (OUTPATIENT)
Dept: RHEUMATOLOGY | Facility: CLINIC | Age: 62
End: 2022-02-11

## 2022-02-13 RX ORDER — RIFAMPIN 300 MG/1
300 CAPSULE ORAL DAILY
Qty: 60 | Refills: 2 | Status: COMPLETED | COMMUNITY
Start: 2021-09-01 | End: 2022-02-13

## 2022-02-14 ENCOUNTER — TRANSCRIPTION ENCOUNTER (OUTPATIENT)
Age: 62
End: 2022-02-14

## 2022-02-16 ENCOUNTER — TRANSCRIPTION ENCOUNTER (OUTPATIENT)
Age: 62
End: 2022-02-16

## 2022-02-17 ENCOUNTER — TRANSCRIPTION ENCOUNTER (OUTPATIENT)
Age: 62
End: 2022-02-17

## 2022-02-22 ENCOUNTER — TRANSCRIPTION ENCOUNTER (OUTPATIENT)
Age: 62
End: 2022-02-22

## 2022-02-23 ENCOUNTER — TRANSCRIPTION ENCOUNTER (OUTPATIENT)
Age: 62
End: 2022-02-23

## 2022-02-24 ENCOUNTER — APPOINTMENT (OUTPATIENT)
Dept: ORTHOPEDIC SURGERY | Facility: CLINIC | Age: 62
End: 2022-02-24

## 2022-02-25 ENCOUNTER — APPOINTMENT (OUTPATIENT)
Dept: RHEUMATOLOGY | Facility: CLINIC | Age: 62
End: 2022-02-25
Payer: COMMERCIAL

## 2022-02-25 VITALS
TEMPERATURE: 98.4 F | BODY MASS INDEX: 23.3 KG/M2 | HEART RATE: 80 BPM | WEIGHT: 145 LBS | SYSTOLIC BLOOD PRESSURE: 151 MMHG | OXYGEN SATURATION: 99 % | HEIGHT: 66 IN | DIASTOLIC BLOOD PRESSURE: 84 MMHG

## 2022-02-25 PROCEDURE — 99214 OFFICE O/P EST MOD 30 MIN: CPT | Mod: 25

## 2022-02-25 PROCEDURE — 90750 HZV VACC RECOMBINANT IM: CPT

## 2022-02-25 PROCEDURE — 90471 IMMUNIZATION ADMIN: CPT

## 2022-02-25 RX ORDER — MELOXICAM 7.5 MG/1
7.5 TABLET ORAL
Qty: 30 | Refills: 2 | Status: COMPLETED | COMMUNITY
Start: 2022-02-10 | End: 2022-02-25

## 2022-02-25 RX ORDER — TOCILIZUMAB 180 MG/ML
162 INJECTION, SOLUTION SUBCUTANEOUS
Qty: 4 | Refills: 2 | Status: COMPLETED | COMMUNITY
Start: 2022-02-13 | End: 2022-02-25

## 2022-02-25 NOTE — ASSESSMENT
[FreeTextEntry1] : \par 1-Large Vessel Vasculitis \par Aortitis with wall thickening of TA Ao and bilateral LORI seen on CT A/P since 2/2021 \par IgG4 levels normal ,ALDAIR, ANCA and all serology negative, elevated ESR, CRP (on steroids)\par follow-up CTA of neck and chest were normal, MRA chest 8/20/2021 - no evidence of aortitis \par CTA chest 9/8/21, discussed with radiologist - Increased thickness in aortic wall compared to 5/21 imaging\par Has been off prednisone as of 1/1/2022\par \par ****PET CT 1/19/22: FDG avidity along the walls of the thoracic and abdominal aorta with activity extending into the right BC, bilateral SC arteries, bilateral iliac and femoral vessels.\par  \par Plan:\par -Given persistent inflammation on PET after > 6 months of GCs\par I recommended initiation of Tocilizumab\par Discussed with patient at length the risks, benefits and alternatives for therapy as well as the potential side effects. Patient fully understands and agreeable with the plan of treatment. All questions answered. \par Printed information provided for patient\par -First dose received today (left thigh)\par \par \par \par 2-Bone Health\par  DEXA 8/21 -- minimal osteopenia- FRAX 14/2.9\par -Hold off on bisphosphonates for now, off prednisone \par \par 3- Vaccination status/immunosuppressed status: \par COVID vaccine completed , booster :11/30/21 \par PCV 13 -received, flu vaccine -received\par PPSV 23 administered 2/10/22\par ***-shingrix administered today -first dose (outside pharmacy)\par Second dose due after May 2022\par \par 4- MGUS\par .skeletal survey 2/8/21 no lesions\par .heme Dr Rosen- most recent visit 5/27 - no MM on bm , established care with Dr. Lozoya \par \par 5- Long standing smoker\par CT chest without contrast 2/17/21: 3 mm and 7 mm nodules\par recommended repeat in 6- 12 months , MRA chest 8/2021- no pulmonary nodules\par \par \par RTO in 2-3 weeks\par \par \par Patient aware of my assessment and plan, all questions answered.\par \par \par \par

## 2022-02-25 NOTE — HISTORY OF PRESENT ILLNESS
[FreeTextEntry1] : #Interval events:\par -Patient was seen by podiatrist, received injection in right ankle\par Reports improvement in pain\par Is not currently wearing a boot\par She is currently off from work for 2 weeks on short-term disability\par -Has been taking prednisone 40 mg daily for the past 2 weeks\par -Is here today to receive her first injection of Tocilizumab\par \par \par \par -------------------\par History:\par 1. Aortitis \par = March 31, 2021 - was transferred to [facility] in [place],admitted overnight, released April 1; CT : mild circumferential wall thickenning thoracolumbar aorta, no periartic stranding- suggestive of aortitis \par Differential included inflammatory (LVV, IgG4 -RD), infectious, atherosclerotic (less likely)\par ALDAIR, ANCA and all serology negative\par normal IgG4 levels \par Elevated ESR/CRP (on steroids)\par CTA of neck and chest were reported as normal in May 2021\par In July 2021 had few episodes of severe midsternal chest pain lasting for less than half an hour similar to her initial presentation was related to lowering prednisone to 30 mg daily for 2 weeks and has been doing 20 mg daily since 6/27. As a result of recurrent symptoms prednisone increased back to 30 mg a day\par While prednisone is reduced to 12.5 mg a day in early September 2021 she experienced few episodes of acute onset severe throbbing pain retrosternal pain, was evaluated inpatient : cardiac w/u with echo and coronory CT was normal.\par CTA chest 9/2021( discussed with radiologist and compared with previous on 5/2021) showed increased wall thickness of descending aorta : and ascending aorta : comparing to 5/2021 and 2) fat necrosis of bilateral breasts (though asymptomatic), 3) 4mm nodule intrapulmonary lymph node, 4) 2cm hepatic cyst\par \par 2. History of +HCV, status post treatment>6 years ago ~2015 and cured)\par  HCV RNA undetectable \par \par 3. MGUS - switched care to [facility] \par \par 4. Latent TB\par positive quantiferon gold- Started INH and B6 5/3/2021 - was not able to tolerate - transaminitis\par on Rifampin 600 mg po daily x 4 months. Start date 9/1/2021.completed on 1/1/2022\par \par 5.Had h/o breast reduction surgery in 2015, on CTA chest in 9/2021 had incidental report of fat necrosis in bilateral breast soft tissue. Indeterminate nodular focus in the left breast soft tissue.\par Had mammogram and US breast in 10/2021- negative for suspicious lesions\par mammogram and breast US were done in 9-10/2021- normal\par \par 6- Right ankle pain -on MRI 9/8/2021: no evidence AVN / Full-thickness chondral fissuring along the lateral talar dome with cartilage signal heterogeneity in the central posterior tibial plato. Chronic fracture remodeling of the distal fibular diaphysis. Mild marrow edema in the distal fibula suggesting stress reaction. Sprain of the ATFL with scar remodeling of the CFL.\par 7-Sciatica, s.p epidural shot on 1/3/22\par \par

## 2022-03-01 ENCOUNTER — APPOINTMENT (OUTPATIENT)
Dept: RHEUMATOLOGY | Facility: CLINIC | Age: 62
End: 2022-03-01

## 2022-03-01 LAB
ALBUMIN MFR SERPL ELPH: 52.5 %
ALBUMIN SERPL ELPH-MCNC: 4.3 G/DL
ALBUMIN SERPL-MCNC: 3.5 G/DL
ALBUMIN/GLOB SERPL: 1.1 RATIO
ALP BLD-CCNC: 113 U/L
ALPHA1 GLOB MFR SERPL ELPH: 6.2 %
ALPHA1 GLOB SERPL ELPH-MCNC: 0.4 G/DL
ALPHA2 GLOB MFR SERPL ELPH: 18.3 %
ALPHA2 GLOB SERPL ELPH-MCNC: 1.2 G/DL
ALT SERPL-CCNC: 17 U/L
ANION GAP SERPL CALC-SCNC: 16 MMOL/L
AST SERPL-CCNC: 17 U/L
B-GLOBULIN MFR SERPL ELPH: 11.9 %
B-GLOBULIN SERPL ELPH-MCNC: 0.8 G/DL
B2 MICROGLOB SERPL-MCNC: 2.4 MG/L
BILIRUB SERPL-MCNC: 0.2 MG/DL
BUN SERPL-MCNC: 14 MG/DL
CALCIUM SERPL-MCNC: 9.2 MG/DL
CHLORIDE SERPL-SCNC: 106 MMOL/L
CO2 SERPL-SCNC: 21 MMOL/L
CREAT SERPL-MCNC: 0.51 MG/DL
CRP SERPL-MCNC: 31 MG/L
CRYOFIB BLD-MCNC: NEGATIVE
CRYOGLOB SERPL-MCNC: NEGATIVE
DEPRECATED KAPPA LC FREE/LAMBDA SER: 0.08 RATIO
GAMMA GLOB FLD ELPH-MCNC: 0.7 G/DL
GAMMA GLOB MFR SERPL ELPH: 11.1 %
GLUCOSE SERPL-MCNC: 122 MG/DL
IGA SER QL IEP: 115 MG/DL
IGG SER QL IEP: 820 MG/DL
IGM SER QL IEP: 23 MG/DL
INTERPRETATION SERPL IEP-IMP: NORMAL
KAPPA LC CSF-MCNC: 14.67 MG/DL
KAPPA LC SERPL-MCNC: 1.24 MG/DL
LDH SERPL-CCNC: 203 U/L
M PROTEIN MFR SERPL ELPH: NORMAL
M PROTEIN SPEC IFE-MCNC: NORMAL
MONOCLON BAND OBS SERPL: NORMAL
POTASSIUM SERPL-SCNC: 3.7 MMOL/L
PROT SERPL-MCNC: 6.7 G/DL
SODIUM SERPL-SCNC: 143 MMOL/L

## 2022-03-01 NOTE — PHYSICAL EXAM
[Restricted in physically strenuous activity but ambulatory and able to carry out work of a light or sedentary nature] : Status 1- Restricted in physically strenuous activity but ambulatory and able to carry out work of a light or sedentary nature, e.g., light house work, office work [Normal] : affect appropriate [de-identified] : bilateral paraspinal tenderness and fullness in mid to lower back, ambulating slowly

## 2022-03-01 NOTE — ASSESSMENT
[FreeTextEntry1] : Ms Franklin is a 60 yo female with a Hx of Hep C (cured, last RNA testing negative), Aortitis (1st occurrence in 2021, s/p steroids) here for further evaluation and monitoring of her IgG lambda MGUS that was initially diagnosed in 2/2021. \par \par She has not CRAB signs or symptoms and her most recent PET-CT imaging was in Jan 2022. Her disease consists mostly of elevated lambda light chain with an involved vs uninvolved ratio that remains < 100. She does have a weak M-spike that is not quantifiable on recent blood work. Her last BM biopsy in 2/2021 showed 10% monotypic plasma cells. At this time she does not meet revised IMWG criteria for smoldering myeloma or multiple myeloma. She is currently on observation. \par \par She has been having severe right ankle, neck and ? back pain. She also has ongoing aortitis / vasculitis, which was observed on a recent PET-CT scan. She plans to meet again with her Rheumatologist Dr Сергей Patino for additional management. \par \par Plan:\par - Repeat serum immunoelectrophoresis studies, obtain 24-hour UPEP\par - CMP, Hep  B and repeat C testing, cryoglobulin testing (can be associated with certain plasma cell neoplasms or Hep C related vasculitis), LDH, inflammatory markers\par - No repeat BM biopsy currently indicated, especially in the setting of acute inflammation related to vasculitis\par - Monitor serum immunoelectrophoresis every 2-3 months\par - Follow-up with Rheum Dr Сергей Patino for management of vasculitis\par - Next visit in 2 months\par \par _____\par I personally have spent a total of 60 minutes of time on the date of this encounter reviewing test results, documenting findings, providing education, coordinating care and directly consulting with the patient and/or designated family member.

## 2022-03-01 NOTE — HISTORY OF PRESENT ILLNESS
[Disease:__________________________] : Disease: [unfilled] [Treatment Protocol] : Treatment Protocol [de-identified] : 62 yo post-menopausal female with a history of Hep C (s/p curative therapy), vasculitis / aortitis (active), migraines and MGUS (dx 2/2021) here to establish care with a new hematologist. She was previously seen by Dr Tobias Bocanegra who diagnosed her with MGUS after performing a myeloma workup. 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 No increase in blasts.\par Flow: Monoclonal lambda plasma cell population that is CD38+ encompassed < 1 % of analyzed WBCs\par Cytogenetics: Normal 46,XX female karyotype\par FISH: Positive for CCND1/IGH t(11;14) -- Negative for RB1 deletion, TP53 (17p13) deletion, duplication 1q21, FGFR3/IGH t(4;14) and IGH/MAF t(14;16)\par \par Imaging:\par Skeletal Survey 2/8/21: No lytic or blastic osseous lesions\par CT Chest (w/o contrast) 2/17/21: 3 mm subpleural nodule thought to be postinflammatory or benign lymph node. No destructive lesions or other significant findings.\par \par PET-CT 1/19/22: \par 1. FDG avidity along the walls of the thoracic and abdominal aorta, with activity extending into the right brachiocephalic and bilateral subclavian arteries, and bilateral iliac and femoral vessels. This is concerning for vasculitis. \par 2. Cystic lesions in bilateral breasts with associated FDG avid soft tissue in the anterior posterior aspects.\par 3. No abnormal FDG activity in bones\par \par Social Hx:\par , lives in [place], works as an , born in [place]\par Former Smoker\par No significant Alcohol use\par \par Allergies:\par NKDA\par \par ========================================================\par Prior Hematologist / Oncologist: Dr Tobias Damon ([facility])\par Rheumatologist: Dr Сергей Patino [de-identified] : N/A [de-identified] : See above [de-identified] : Positive for t(11;14) with is favorable in MGUG/Myeloma [FreeTextEntry1] : Observation [de-identified] : 2/9/22: Initial Visit. Ms Rigoberto presents today to establish care with a new hematology team connected to [facility]. Today she complains of severe neck, right ankle, leg/back pain limiting her ability to work and stand for long periods of time. She has had a spinal workup in the past with multiple herniated discs, and also has had steroid injections without significant improvement as of recent (last injection on Alhaji 3, 2022). She has had severe pain in her right ankle for many weeks now and she feels it may radiate from her back or leg. She denied any numbness. This has significantly affected her ability to function both at home and at work and descending altitude on the plane makes the pain unbearable. Recent PET-CT showed recurrent aortitis in Jan 2022. She denies any recent fevers, chills, night sweats, significant weight loss. She also reports that she was recently told that she has smoldering myeloma, which at this time does not appear to be true.\par \par ___________\par A comprehensive review of systems was performed including constitutional, eyes, ENT, cardiovascular, respiratory, gastrointestinal, genitourinary, musculoskeletal, integumentary, neurological, psychiatric and hematologic / lymphatic. All pertinent positives are included in the H&P under interval history above and the remaining review of systems listed are negative.

## 2022-03-04 ENCOUNTER — APPOINTMENT (OUTPATIENT)
Dept: RHEUMATOLOGY | Facility: CLINIC | Age: 62
End: 2022-03-04
Payer: COMMERCIAL

## 2022-03-04 VITALS
TEMPERATURE: 96.4 F | DIASTOLIC BLOOD PRESSURE: 87 MMHG | BODY MASS INDEX: 23.3 KG/M2 | HEIGHT: 66 IN | WEIGHT: 145 LBS | HEART RATE: 89 BPM | OXYGEN SATURATION: 98 % | SYSTOLIC BLOOD PRESSURE: 164 MMHG

## 2022-03-04 PROCEDURE — 96413 CHEMO IV INFUSION 1 HR: CPT

## 2022-03-04 NOTE — ASSESSMENT
[FreeTextEntry1] : Patient receiving today second subcutaneous injection of Tocilizumab\par Previous dose received last week, well-tolerated overall, she noted some dizziness the day after subsequently resolved, no other issues or complaints\par

## 2022-03-04 NOTE — PROCEDURE
[Today's Date:] : Date: [unfilled] [Therapeutic] : therapeutic [#1 Site: ______] : #1 site identified in the [unfilled] [de-identified] : Subcutaneous injection of Tocilizumab

## 2022-03-09 ENCOUNTER — TRANSCRIPTION ENCOUNTER (OUTPATIENT)
Age: 62
End: 2022-03-09

## 2022-03-09 LAB
ALBUMIN SERPL ELPH-MCNC: 4.7 G/DL
ALP BLD-CCNC: 81 U/L
ALT SERPL-CCNC: 21 U/L
ANION GAP SERPL CALC-SCNC: 15 MMOL/L
AST SERPL-CCNC: 16 U/L
BASOPHILS # BLD AUTO: 0.02 K/UL
BASOPHILS NFR BLD AUTO: 0.4 %
BILIRUB SERPL-MCNC: 0.6 MG/DL
BUN SERPL-MCNC: 14 MG/DL
CALCIUM SERPL-MCNC: 9.2 MG/DL
CHLORIDE SERPL-SCNC: 104 MMOL/L
CO2 SERPL-SCNC: 23 MMOL/L
CREAT SERPL-MCNC: 0.54 MG/DL
EGFR: 105 ML/MIN/1.73M2
EOSINOPHIL # BLD AUTO: 0.04 K/UL
EOSINOPHIL NFR BLD AUTO: 0.7 %
HCT VFR BLD CALC: 45.3 %
HGB BLD-MCNC: 14.1 G/DL
IMM GRANULOCYTES NFR BLD AUTO: 0.5 %
LYMPHOCYTES # BLD AUTO: 1.08 K/UL
LYMPHOCYTES NFR BLD AUTO: 19.4 %
MAN DIFF?: NORMAL
MCHC RBC-ENTMCNC: 28.8 PG
MCHC RBC-ENTMCNC: 31.1 GM/DL
MCV RBC AUTO: 92.4 FL
MONOCYTES # BLD AUTO: 0.39 K/UL
MONOCYTES NFR BLD AUTO: 7 %
NEUTROPHILS # BLD AUTO: 4.02 K/UL
NEUTROPHILS NFR BLD AUTO: 72 %
PLATELET # BLD AUTO: 247 K/UL
POTASSIUM SERPL-SCNC: 4.7 MMOL/L
PROT SERPL-MCNC: 6.5 G/DL
RBC # BLD: 4.9 M/UL
RBC # FLD: 15.6 %
SODIUM SERPL-SCNC: 142 MMOL/L
WBC # FLD AUTO: 5.58 K/UL

## 2022-03-11 ENCOUNTER — APPOINTMENT (OUTPATIENT)
Dept: RHEUMATOLOGY | Facility: CLINIC | Age: 62
End: 2022-03-11
Payer: COMMERCIAL

## 2022-03-11 VITALS
DIASTOLIC BLOOD PRESSURE: 85 MMHG | TEMPERATURE: 97.4 F | HEART RATE: 89 BPM | HEIGHT: 66 IN | BODY MASS INDEX: 24.11 KG/M2 | WEIGHT: 150 LBS | SYSTOLIC BLOOD PRESSURE: 156 MMHG | OXYGEN SATURATION: 95 %

## 2022-03-11 PROCEDURE — 96401 CHEMO ANTI-NEOPL SQ/IM: CPT

## 2022-03-11 NOTE — PROCEDURE
[Today's Date:] : Date: [unfilled] [Therapeutic] : therapeutic [#1 Site: ______] : #1 site identified in the [unfilled] [Tolerated Well] : the patient tolerated the procedure well

## 2022-03-11 NOTE — ASSESSMENT
[FreeTextEntry1] : Subcutaneous administration of Actemra, third dose today\par Patient tolerated previous dose without any side effects\par Recent blood work reviewed with patient, within normal limits\par Next dose scheduled for next Friday\par Awaiting to obtain autoinjector pen

## 2022-03-18 ENCOUNTER — APPOINTMENT (OUTPATIENT)
Dept: RHEUMATOLOGY | Facility: CLINIC | Age: 62
End: 2022-03-18
Payer: COMMERCIAL

## 2022-03-18 VITALS
HEIGHT: 66 IN | HEART RATE: 92 BPM | OXYGEN SATURATION: 98 % | BODY MASS INDEX: 24.11 KG/M2 | TEMPERATURE: 97.2 F | SYSTOLIC BLOOD PRESSURE: 154 MMHG | DIASTOLIC BLOOD PRESSURE: 86 MMHG | WEIGHT: 150 LBS

## 2022-03-18 PROCEDURE — 96401 CHEMO ANTI-NEOPL SQ/IM: CPT

## 2022-03-18 NOTE — PROCEDURE
[Today's Date:] : Date: [unfilled] [Therapeutic] : therapeutic [#1 Site: ______] : #1 site identified in the [unfilled] [de-identified] : Actemra

## 2022-03-18 NOTE — ASSESSMENT
[FreeTextEntry1] : Subcutaneous administration of Actemra, fourth dose today\par Patient tolerated previous dose without any side effects\par Next dose scheduled for next Friday 3/25 with Dr Mckeon( autoinjector pen now received)\par Full visit on 3/31 \par \par

## 2022-03-19 ENCOUNTER — OUTPATIENT (OUTPATIENT)
Dept: OUTPATIENT SERVICES | Facility: HOSPITAL | Age: 62
LOS: 1 days | End: 2022-03-19
Payer: COMMERCIAL

## 2022-03-19 ENCOUNTER — APPOINTMENT (OUTPATIENT)
Dept: CT IMAGING | Facility: CLINIC | Age: 62
End: 2022-03-19
Payer: COMMERCIAL

## 2022-03-19 DIAGNOSIS — M25.571 PAIN IN RIGHT ANKLE AND JOINTS OF RIGHT FOOT: ICD-10-CM

## 2022-03-19 DIAGNOSIS — G56.01 CARPAL TUNNEL SYNDROME, RIGHT UPPER LIMB: Chronic | ICD-10-CM

## 2022-03-19 DIAGNOSIS — Z98.89 OTHER SPECIFIED POSTPROCEDURAL STATES: Chronic | ICD-10-CM

## 2022-03-19 DIAGNOSIS — Z98.890 OTHER SPECIFIED POSTPROCEDURAL STATES: Chronic | ICD-10-CM

## 2022-03-19 PROCEDURE — 76376 3D RENDER W/INTRP POSTPROCES: CPT

## 2022-03-19 PROCEDURE — 73700 CT LOWER EXTREMITY W/O DYE: CPT

## 2022-03-19 PROCEDURE — 76376 3D RENDER W/INTRP POSTPROCES: CPT | Mod: 26

## 2022-03-19 PROCEDURE — 73700 CT LOWER EXTREMITY W/O DYE: CPT | Mod: 26,RT

## 2022-03-25 ENCOUNTER — APPOINTMENT (OUTPATIENT)
Dept: RHEUMATOLOGY | Facility: CLINIC | Age: 62
End: 2022-03-25
Payer: COMMERCIAL

## 2022-03-25 VITALS
BODY MASS INDEX: 24.11 KG/M2 | HEIGHT: 66 IN | TEMPERATURE: 96.7 F | OXYGEN SATURATION: 98 % | HEART RATE: 90 BPM | WEIGHT: 150 LBS | DIASTOLIC BLOOD PRESSURE: 90 MMHG | RESPIRATION RATE: 18 BRPM | SYSTOLIC BLOOD PRESSURE: 149 MMHG

## 2022-03-25 PROCEDURE — 96401 CHEMO ANTI-NEOPL SQ/IM: CPT

## 2022-03-29 ENCOUNTER — APPOINTMENT (OUTPATIENT)
Dept: RHEUMATOLOGY | Facility: CLINIC | Age: 62
End: 2022-03-29

## 2022-03-31 ENCOUNTER — APPOINTMENT (OUTPATIENT)
Dept: RHEUMATOLOGY | Facility: CLINIC | Age: 62
End: 2022-03-31
Payer: COMMERCIAL

## 2022-03-31 VITALS
OXYGEN SATURATION: 98 % | SYSTOLIC BLOOD PRESSURE: 146 MMHG | HEART RATE: 77 BPM | BODY MASS INDEX: 24.11 KG/M2 | HEIGHT: 66 IN | DIASTOLIC BLOOD PRESSURE: 88 MMHG | WEIGHT: 150 LBS | TEMPERATURE: 97.3 F

## 2022-03-31 PROCEDURE — 99213 OFFICE O/P EST LOW 20 MIN: CPT

## 2022-03-31 NOTE — ASSESSMENT
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 LORI seen on CT A/P since 2/2021 \par IgG4 levels normal ,ALDAIR, ANCA and all serology negative, elevated ESR, CRP (on steroids)\par follow-up CTA of neck and chest were normal, MRA chest 8/20/2021 - no evidence of aortitis \par CTA chest 9/8/21, discussed with radiologist - Increased thickness in aortic wall compared to 5/21 imaging\par *PET CT 1/19/22: FDG avidity along the walls of the thoracic and abdominal aorta with activity extending into the right BC, bilateral SC arteries, bilateral iliac and femoral vessels.\par **Started TCZ on 2/25 \par \par -Continue with weekly TCZ\par -obtain monitoring blood work today \par \par \par 2-Bone Health\par  DEXA 8/21 -- minimal osteopenia- FRAX 14/2.9\par -Hold off on bisphosphonates for now, off prednisone \par \par 3- Vaccination status/immunosuppressed status: \par COVID vaccine completed , booster :11/30/21 -patient to schedule second booster dose\par PCV 13 -received, flu vaccine -received\par PPSV 23 administered 2/10/22\par Shingrix-first dose 2/25-**Second dose due after May 2022\par \par 4- MGUS\par .skeletal survey 2/8/21 no lesions\par .heme Dr Rosen- most recent visit 5/27 - no MM on bm , established care with Dr. Lozoya \par \par 5- Long standing smoker\par CT chest without contrast 2/17/21: 3 mm and 7 mm nodules\par recommended repeat in 6- 12 months , MRA chest 8/2021- no pulmonary nodules\par \par RTO in 1 month\par

## 2022-03-31 NOTE — HISTORY OF PRESENT ILLNESS
[de-identified] : At today's visit, [FreeTextEntry1] : She reports no recurrence of shortness of breath or chest pain\par Tolerating Actemra well, is not ready to self administer the auto injectable pen\par Continues to experience right ankle pain, so another orthopedist recently and received an intra-articular injection

## 2022-03-31 NOTE — PHYSICAL EXAM
[General Appearance - Alert] : alert [General Appearance - In No Acute Distress] : in no acute distress [Sclera] : the sclera and conjunctiva were normal [Auscultation Breath Sounds / Voice Sounds] : lungs were clear to auscultation bilaterally [Heart Sounds] : normal S1 and S2 [Oriented To Time, Place, And Person] : oriented to person, place, and time

## 2022-04-04 ENCOUNTER — TRANSCRIPTION ENCOUNTER (OUTPATIENT)
Age: 62
End: 2022-04-04

## 2022-04-04 ENCOUNTER — EMERGENCY (EMERGENCY)
Facility: HOSPITAL | Age: 62
LOS: 0 days | Discharge: HOME | End: 2022-04-04
Attending: EMERGENCY MEDICINE
Payer: COMMERCIAL

## 2022-04-04 ENCOUNTER — NON-APPOINTMENT (OUTPATIENT)
Age: 62
End: 2022-04-04

## 2022-04-04 VITALS
WEIGHT: 149.91 LBS | HEIGHT: 66 IN | HEART RATE: 18 BPM | OXYGEN SATURATION: 99 % | SYSTOLIC BLOOD PRESSURE: 176 MMHG | RESPIRATION RATE: 80 BRPM | DIASTOLIC BLOOD PRESSURE: 116 MMHG | TEMPERATURE: 97 F

## 2022-04-04 DIAGNOSIS — Z98.890 OTHER SPECIFIED POSTPROCEDURAL STATES: Chronic | ICD-10-CM

## 2022-04-04 DIAGNOSIS — E05.90 THYROTOXICOSIS, UNSPECIFIED WITHOUT THYROTOXIC CRISIS OR STORM: ICD-10-CM

## 2022-04-04 DIAGNOSIS — G56.01 CARPAL TUNNEL SYNDROME, RIGHT UPPER LIMB: Chronic | ICD-10-CM

## 2022-04-04 DIAGNOSIS — L03.211 CELLULITIS OF FACE: ICD-10-CM

## 2022-04-04 DIAGNOSIS — K08.89 OTHER SPECIFIED DISORDERS OF TEETH AND SUPPORTING STRUCTURES: ICD-10-CM

## 2022-04-04 DIAGNOSIS — R51.9 HEADACHE, UNSPECIFIED: ICD-10-CM

## 2022-04-04 DIAGNOSIS — Z98.89 OTHER SPECIFIED POSTPROCEDURAL STATES: Chronic | ICD-10-CM

## 2022-04-04 DIAGNOSIS — E78.5 HYPERLIPIDEMIA, UNSPECIFIED: ICD-10-CM

## 2022-04-04 LAB
ALBUMIN SERPL ELPH-MCNC: 3.9 G/DL — SIGNIFICANT CHANGE UP (ref 3.3–5)
ALP SERPL-CCNC: 69 U/L — SIGNIFICANT CHANGE UP (ref 40–120)
ALT FLD-CCNC: 31 U/L — SIGNIFICANT CHANGE UP (ref 12–78)
ANION GAP SERPL CALC-SCNC: 6 MMOL/L — SIGNIFICANT CHANGE UP (ref 5–17)
AST SERPL-CCNC: 21 U/L — SIGNIFICANT CHANGE UP (ref 15–37)
BASOPHILS # BLD AUTO: 0.05 K/UL — SIGNIFICANT CHANGE UP (ref 0–0.2)
BASOPHILS NFR BLD AUTO: 0.8 % — SIGNIFICANT CHANGE UP (ref 0–2)
BILIRUB SERPL-MCNC: 0.6 MG/DL — SIGNIFICANT CHANGE UP (ref 0.2–1.2)
BUN SERPL-MCNC: 14 MG/DL — SIGNIFICANT CHANGE UP (ref 7–23)
CALCIUM SERPL-MCNC: 8.9 MG/DL — SIGNIFICANT CHANGE UP (ref 8.5–10.1)
CHLORIDE SERPL-SCNC: 108 MMOL/L — SIGNIFICANT CHANGE UP (ref 96–108)
CO2 SERPL-SCNC: 29 MMOL/L — SIGNIFICANT CHANGE UP (ref 22–31)
CREAT SERPL-MCNC: 0.53 MG/DL — SIGNIFICANT CHANGE UP (ref 0.5–1.3)
EGFR: 105 ML/MIN/1.73M2 — SIGNIFICANT CHANGE UP
EOSINOPHIL # BLD AUTO: 0.11 K/UL — SIGNIFICANT CHANGE UP (ref 0–0.5)
EOSINOPHIL NFR BLD AUTO: 1.7 % — SIGNIFICANT CHANGE UP (ref 0–6)
FLUAV AG NPH QL: SIGNIFICANT CHANGE UP
FLUBV AG NPH QL: SIGNIFICANT CHANGE UP
GLUCOSE SERPL-MCNC: 105 MG/DL — HIGH (ref 70–99)
HCT VFR BLD CALC: 42.2 % — SIGNIFICANT CHANGE UP (ref 34.5–45)
HGB BLD-MCNC: 14 G/DL — SIGNIFICANT CHANGE UP (ref 11.5–15.5)
IMM GRANULOCYTES NFR BLD AUTO: 0.3 % — SIGNIFICANT CHANGE UP (ref 0–1.5)
LYMPHOCYTES # BLD AUTO: 1.87 K/UL — SIGNIFICANT CHANGE UP (ref 1–3.3)
LYMPHOCYTES # BLD AUTO: 29.7 % — SIGNIFICANT CHANGE UP (ref 13–44)
MCHC RBC-ENTMCNC: 29.2 PG — SIGNIFICANT CHANGE UP (ref 27–34)
MCHC RBC-ENTMCNC: 33.2 G/DL — SIGNIFICANT CHANGE UP (ref 32–36)
MCV RBC AUTO: 88.1 FL — SIGNIFICANT CHANGE UP (ref 80–100)
MONOCYTES # BLD AUTO: 0.78 K/UL — SIGNIFICANT CHANGE UP (ref 0–0.9)
MONOCYTES NFR BLD AUTO: 12.4 % — SIGNIFICANT CHANGE UP (ref 2–14)
NEUTROPHILS # BLD AUTO: 3.46 K/UL — SIGNIFICANT CHANGE UP (ref 1.8–7.4)
NEUTROPHILS NFR BLD AUTO: 55.1 % — SIGNIFICANT CHANGE UP (ref 43–77)
NRBC # BLD: 0 /100 WBCS — SIGNIFICANT CHANGE UP (ref 0–0)
PLATELET # BLD AUTO: 204 K/UL — SIGNIFICANT CHANGE UP (ref 150–400)
POTASSIUM SERPL-MCNC: 4.4 MMOL/L — SIGNIFICANT CHANGE UP (ref 3.5–5.3)
POTASSIUM SERPL-SCNC: 4.4 MMOL/L — SIGNIFICANT CHANGE UP (ref 3.5–5.3)
PROT SERPL-MCNC: 6.9 GM/DL — SIGNIFICANT CHANGE UP (ref 6–8.3)
RBC # BLD: 4.79 M/UL — SIGNIFICANT CHANGE UP (ref 3.8–5.2)
RBC # FLD: 15.2 % — HIGH (ref 10.3–14.5)
SARS-COV-2 RNA SPEC QL NAA+PROBE: SIGNIFICANT CHANGE UP
SODIUM SERPL-SCNC: 143 MMOL/L — SIGNIFICANT CHANGE UP (ref 135–145)
WBC # BLD: 6.29 K/UL — SIGNIFICANT CHANGE UP (ref 3.8–10.5)
WBC # FLD AUTO: 6.29 K/UL — SIGNIFICANT CHANGE UP (ref 3.8–10.5)

## 2022-04-04 PROCEDURE — 99285 EMERGENCY DEPT VISIT HI MDM: CPT

## 2022-04-04 PROCEDURE — 70487 CT MAXILLOFACIAL W/DYE: CPT | Mod: 26,MA

## 2022-04-04 RX ORDER — VALACYCLOVIR 500 MG/1
1 TABLET, FILM COATED ORAL
Qty: 0 | Refills: 0 | DISCHARGE

## 2022-04-04 RX ORDER — AMPICILLIN SODIUM AND SULBACTAM SODIUM 250; 125 MG/ML; MG/ML
1.5 INJECTION, POWDER, FOR SUSPENSION INTRAMUSCULAR; INTRAVENOUS ONCE
Refills: 0 | Status: COMPLETED | OUTPATIENT
Start: 2022-04-04 | End: 2022-04-04

## 2022-04-04 RX ORDER — VALACYCLOVIR 500 MG/1
750 TABLET, FILM COATED ORAL
Qty: 0 | Refills: 0 | DISCHARGE

## 2022-04-04 RX ORDER — AMPICILLIN SODIUM AND SULBACTAM SODIUM 250; 125 MG/ML; MG/ML
1.5 INJECTION, POWDER, FOR SUSPENSION INTRAMUSCULAR; INTRAVENOUS ONCE
Refills: 0 | Status: DISCONTINUED | OUTPATIENT
Start: 2022-04-04 | End: 2022-04-04

## 2022-04-04 RX ORDER — OMEPRAZOLE 10 MG/1
1 CAPSULE, DELAYED RELEASE ORAL
Qty: 0 | Refills: 0 | DISCHARGE

## 2022-04-04 RX ORDER — OXYCODONE AND ACETAMINOPHEN 5; 325 MG/1; MG/1
1 TABLET ORAL ONCE
Refills: 0 | Status: DISCONTINUED | OUTPATIENT
Start: 2022-04-04 | End: 2022-04-04

## 2022-04-04 RX ADMIN — OXYCODONE AND ACETAMINOPHEN 1 TABLET(S): 5; 325 TABLET ORAL at 09:00

## 2022-04-04 RX ADMIN — OXYCODONE AND ACETAMINOPHEN 1 TABLET(S): 5; 325 TABLET ORAL at 07:48

## 2022-04-04 RX ADMIN — OXYCODONE AND ACETAMINOPHEN 1 TABLET(S): 5; 325 TABLET ORAL at 11:14

## 2022-04-04 RX ADMIN — AMPICILLIN SODIUM AND SULBACTAM SODIUM 100 GRAM(S): 250; 125 INJECTION, POWDER, FOR SUSPENSION INTRAMUSCULAR; INTRAVENOUS at 12:42

## 2022-04-04 NOTE — ED PROVIDER NOTE - NSICDXPASTMEDICALHX_GEN_ALL_CORE_FT
PAST MEDICAL HISTORY:  Aortitis     Arrhythmia     Asthma     Genital herpes     GERD (gastroesophageal reflux disease)     HCV (hepatitis C virus)     Hepatitis C carrier     Hyperthyroidism     KIM (obstructive sleep apnea)     TB lung, latent

## 2022-04-04 NOTE — ED PROVIDER NOTE - CARE PROVIDER_API CALL
Alfredito Martinez  INTERNAL MEDICINE  [address] [place] [address]  Phone: [phone]  Fax: [phone]  Follow Up Time: 4-6 Days

## 2022-04-04 NOTE — ED PROVIDER NOTE - PATIENT PORTAL LINK FT
You can access the FollowMyHealth Patient Portal offered by [facility] by registering at the following website: [email or web address]. By joining [employer] [facility]’s FollowMyHealth portal, you will also be able to view your health information using other applications (apps) compatible with our system.

## 2022-04-04 NOTE — ED ADULT TRIAGE NOTE - CHIEF COMPLAINT QUOTE
C/O L side facial swelling pt reports upper L tooth pain  x 1 month worsen today and lesion on upper L gum worsen today.

## 2022-04-04 NOTE — ED PROVIDER NOTE - ENMT, MLM
Airway patent, Nasal mucosa clear. Mouth with normal mucosa. Throat has no vesicles, no oropharyngeal exudates and uvula is midline. Left upper molar poor dentition. Left cheek and face swelling and tenderness. No neck stiffness.

## 2022-04-04 NOTE — ED PROVIDER NOTE - OBJECTIVE STATEMENT
61 year old female with PMH of aortitis and HLD who presents to the ED for left tooth and face pain and left upper jaw pain for 1 month. Starting last night, pt reports increased swelling and pain. Pt reports she is working on getting dental insurance. Pt states that she went to the dentist last year and had a normal workup. No fever, difficulty breathing, trauma, and HA.

## 2022-04-04 NOTE — ED ADULT NURSE NOTE - OBJECTIVE STATEMENT
Patient presents to ED awake, alert and oriented x4 c/o left sided facial swelling since 4 AM today. Endorses left upper gum pain intermittent x 1 month. Endorses she used tooth gel and mouth wash. Denies open wounds/bleeding on gums. States it feels "bumpy". States she does not have dental insurance yet, last appointment July. PMH aortitis, PSH ablation 6 years ago.

## 2022-04-04 NOTE — ED PROVIDER NOTE - NSICDXPASTSURGICALHX_GEN_ALL_CORE_FT
PAST SURGICAL HISTORY:  Carpal tunnel syndrome, right     S/P LASIK (laser assisted in situ keratomileusis) of both eyes     Status post breast reduction

## 2022-04-04 NOTE — ED PROVIDER NOTE - PROGRESS NOTE DETAILS
Pt is feeling better. CT is negative for abscess, shows cellulitis. Pt started on iv abx, and is comfortable with discharge, and return precautions provided. Pt is ready for d/c.

## 2022-04-04 NOTE — ED PROVIDER NOTE - CLINICAL SUMMARY MEDICAL DECISION MAKING FREE TEXT BOX
DDx: odontogenic/ cellulitis   Plan: CBC, CMP, pain control, and CT maxillofacial, likely referral to dentist.

## 2022-04-04 NOTE — ED PROVIDER NOTE - NSICDXFAMILYHX_GEN_ALL_CORE_FT
FAMILY HISTORY:  Father  Still living? Unknown  Family history of heart attack, Age at diagnosis: Age Unknown  FH: CAD (coronary artery disease), Age at diagnosis: Age Unknown    Sibling  Still living? No  Family history of cardiac arrhythmia, Age at diagnosis: Age Unknown  Family history of heart attack, Age at diagnosis: Age Unknown  Family history of lymphoma, Age at diagnosis: Age Unknown  FH: CAD (coronary artery disease), Age at diagnosis: Age Unknown

## 2022-04-05 ENCOUNTER — APPOINTMENT (OUTPATIENT)
Dept: INTERNAL MEDICINE | Facility: CLINIC | Age: 62
End: 2022-04-05

## 2022-04-05 ENCOUNTER — TRANSCRIPTION ENCOUNTER (OUTPATIENT)
Age: 62
End: 2022-04-05

## 2022-04-05 ENCOUNTER — EMERGENCY (EMERGENCY)
Facility: HOSPITAL | Age: 62
LOS: 1 days | Discharge: HOME | End: 2022-04-05
Attending: EMERGENCY MEDICINE | Admitting: EMERGENCY MEDICINE
Payer: COMMERCIAL

## 2022-04-05 ENCOUNTER — EMERGENCY (EMERGENCY)
Facility: HOSPITAL | Age: 62
LOS: 0 days | Discharge: ANOTHER HOSPITAL | End: 2022-04-05
Attending: EMERGENCY MEDICINE
Payer: COMMERCIAL

## 2022-04-05 VITALS
RESPIRATION RATE: 18 BRPM | DIASTOLIC BLOOD PRESSURE: 93 MMHG | TEMPERATURE: 98 F | SYSTOLIC BLOOD PRESSURE: 162 MMHG | HEART RATE: 88 BPM | OXYGEN SATURATION: 95 %

## 2022-04-05 VITALS
TEMPERATURE: 98 F | HEIGHT: 66 IN | OXYGEN SATURATION: 94 % | DIASTOLIC BLOOD PRESSURE: 93 MMHG | SYSTOLIC BLOOD PRESSURE: 162 MMHG | RESPIRATION RATE: 18 BRPM | HEART RATE: 75 BPM

## 2022-04-05 VITALS
HEIGHT: 66 IN | TEMPERATURE: 98 F | RESPIRATION RATE: 20 BRPM | OXYGEN SATURATION: 97 % | DIASTOLIC BLOOD PRESSURE: 91 MMHG | HEART RATE: 81 BPM | SYSTOLIC BLOOD PRESSURE: 158 MMHG | WEIGHT: 149.91 LBS

## 2022-04-05 DIAGNOSIS — Z98.89 OTHER SPECIFIED POSTPROCEDURAL STATES: Chronic | ICD-10-CM

## 2022-04-05 DIAGNOSIS — G56.01 CARPAL TUNNEL SYNDROME, RIGHT UPPER LIMB: Chronic | ICD-10-CM

## 2022-04-05 DIAGNOSIS — M86.9 OSTEOMYELITIS, UNSPECIFIED: ICD-10-CM

## 2022-04-05 DIAGNOSIS — Z98.890 OTHER SPECIFIED POSTPROCEDURAL STATES: Chronic | ICD-10-CM

## 2022-04-05 DIAGNOSIS — R22.0 LOCALIZED SWELLING, MASS AND LUMP, HEAD: ICD-10-CM

## 2022-04-05 DIAGNOSIS — E78.5 HYPERLIPIDEMIA, UNSPECIFIED: ICD-10-CM

## 2022-04-05 DIAGNOSIS — Z20.822 CONTACT WITH AND (SUSPECTED) EXPOSURE TO COVID-19: ICD-10-CM

## 2022-04-05 LAB
ALBUMIN SERPL ELPH-MCNC: 4 G/DL — SIGNIFICANT CHANGE UP (ref 3.3–5)
ALP SERPL-CCNC: 70 U/L — SIGNIFICANT CHANGE UP (ref 40–120)
ALT FLD-CCNC: 29 U/L — SIGNIFICANT CHANGE UP (ref 12–78)
ANION GAP SERPL CALC-SCNC: 4 MMOL/L — LOW (ref 5–17)
APPEARANCE UR: ABNORMAL
APTT BLD: 35.2 SEC — SIGNIFICANT CHANGE UP (ref 27.5–35.5)
AST SERPL-CCNC: 21 U/L — SIGNIFICANT CHANGE UP (ref 15–37)
BACTERIA # UR AUTO: ABNORMAL
BASOPHILS # BLD AUTO: 0.03 K/UL — SIGNIFICANT CHANGE UP (ref 0–0.2)
BASOPHILS NFR BLD AUTO: 0.5 % — SIGNIFICANT CHANGE UP (ref 0–2)
BILIRUB SERPL-MCNC: 0.5 MG/DL — SIGNIFICANT CHANGE UP (ref 0.2–1.2)
BILIRUB UR-MCNC: NEGATIVE — SIGNIFICANT CHANGE UP
BLD GP AB SCN SERPL QL: SIGNIFICANT CHANGE UP
BUN SERPL-MCNC: 11 MG/DL — SIGNIFICANT CHANGE UP (ref 7–23)
CALCIUM SERPL-MCNC: 8.8 MG/DL — SIGNIFICANT CHANGE UP (ref 8.5–10.1)
CHLORIDE SERPL-SCNC: 105 MMOL/L — SIGNIFICANT CHANGE UP (ref 96–108)
CO2 SERPL-SCNC: 32 MMOL/L — HIGH (ref 22–31)
COLOR SPEC: YELLOW — SIGNIFICANT CHANGE UP
CREAT SERPL-MCNC: 0.64 MG/DL — SIGNIFICANT CHANGE UP (ref 0.5–1.3)
DIFF PNL FLD: ABNORMAL
EGFR: 100 ML/MIN/1.73M2 — SIGNIFICANT CHANGE UP
EOSINOPHIL # BLD AUTO: 0.06 K/UL — SIGNIFICANT CHANGE UP (ref 0–0.5)
EOSINOPHIL NFR BLD AUTO: 1 % — SIGNIFICANT CHANGE UP (ref 0–6)
EPI CELLS # UR: ABNORMAL
ERYTHROCYTE [SEDIMENTATION RATE] IN BLOOD: 2 MM/HR — SIGNIFICANT CHANGE UP (ref 0–20)
FLUAV AG NPH QL: SIGNIFICANT CHANGE UP
FLUBV AG NPH QL: SIGNIFICANT CHANGE UP
GLUCOSE SERPL-MCNC: 94 MG/DL — SIGNIFICANT CHANGE UP (ref 70–99)
GLUCOSE UR QL: NEGATIVE MG/DL — SIGNIFICANT CHANGE UP
HCT VFR BLD CALC: 45.5 % — HIGH (ref 34.5–45)
HGB BLD-MCNC: 15.2 G/DL — SIGNIFICANT CHANGE UP (ref 11.5–15.5)
IMM GRANULOCYTES NFR BLD AUTO: 0.2 % — SIGNIFICANT CHANGE UP (ref 0–1.5)
INR BLD: 1.05 RATIO — SIGNIFICANT CHANGE UP (ref 0.88–1.16)
KETONES UR-MCNC: NEGATIVE — SIGNIFICANT CHANGE UP
LACTATE SERPL-SCNC: 1.1 MMOL/L — SIGNIFICANT CHANGE UP (ref 0.7–2)
LEUKOCYTE ESTERASE UR-ACNC: ABNORMAL
LYMPHOCYTES # BLD AUTO: 1.91 K/UL — SIGNIFICANT CHANGE UP (ref 1–3.3)
LYMPHOCYTES # BLD AUTO: 30.4 % — SIGNIFICANT CHANGE UP (ref 13–44)
MCHC RBC-ENTMCNC: 29.5 PG — SIGNIFICANT CHANGE UP (ref 27–34)
MCHC RBC-ENTMCNC: 33.4 G/DL — SIGNIFICANT CHANGE UP (ref 32–36)
MCV RBC AUTO: 88.3 FL — SIGNIFICANT CHANGE UP (ref 80–100)
MONOCYTES # BLD AUTO: 0.82 K/UL — SIGNIFICANT CHANGE UP (ref 0–0.9)
MONOCYTES NFR BLD AUTO: 13 % — SIGNIFICANT CHANGE UP (ref 2–14)
NEUTROPHILS # BLD AUTO: 3.46 K/UL — SIGNIFICANT CHANGE UP (ref 1.8–7.4)
NEUTROPHILS NFR BLD AUTO: 54.9 % — SIGNIFICANT CHANGE UP (ref 43–77)
NITRITE UR-MCNC: NEGATIVE — SIGNIFICANT CHANGE UP
NRBC # BLD: 0 /100 WBCS — SIGNIFICANT CHANGE UP (ref 0–0)
PH UR: 6 — SIGNIFICANT CHANGE UP (ref 5–8)
PLATELET # BLD AUTO: 219 K/UL — SIGNIFICANT CHANGE UP (ref 150–400)
POTASSIUM SERPL-MCNC: 3.8 MMOL/L — SIGNIFICANT CHANGE UP (ref 3.5–5.3)
POTASSIUM SERPL-SCNC: 3.8 MMOL/L — SIGNIFICANT CHANGE UP (ref 3.5–5.3)
PROT SERPL-MCNC: 7.2 GM/DL — SIGNIFICANT CHANGE UP (ref 6–8.3)
PROT UR-MCNC: 15 MG/DL
PROTHROM AB SERPL-ACNC: 12.6 SEC — SIGNIFICANT CHANGE UP (ref 10.5–13.4)
RBC # BLD: 5.15 M/UL — SIGNIFICANT CHANGE UP (ref 3.8–5.2)
RBC # FLD: 15.2 % — HIGH (ref 10.3–14.5)
RBC CASTS # UR COMP ASSIST: SIGNIFICANT CHANGE UP /HPF (ref 0–4)
SARS-COV-2 RNA SPEC QL NAA+PROBE: SIGNIFICANT CHANGE UP
SODIUM SERPL-SCNC: 141 MMOL/L — SIGNIFICANT CHANGE UP (ref 135–145)
SP GR SPEC: 1.02 — SIGNIFICANT CHANGE UP (ref 1.01–1.02)
UROBILINOGEN FLD QL: NEGATIVE MG/DL — SIGNIFICANT CHANGE UP
WBC # BLD: 6.29 K/UL — SIGNIFICANT CHANGE UP (ref 3.8–10.5)
WBC # FLD AUTO: 6.29 K/UL — SIGNIFICANT CHANGE UP (ref 3.8–10.5)
WBC UR QL: ABNORMAL

## 2022-04-05 PROCEDURE — 70487 CT MAXILLOFACIAL W/DYE: CPT | Mod: 26,MA

## 2022-04-05 PROCEDURE — 99284 EMERGENCY DEPT VISIT MOD MDM: CPT

## 2022-04-05 PROCEDURE — 99285 EMERGENCY DEPT VISIT HI MDM: CPT

## 2022-04-05 RX ORDER — TOCILIZUMAB 20 MG/ML
0 INJECTION, SOLUTION, CONCENTRATE INTRAVENOUS
Qty: 0 | Refills: 0 | DISCHARGE

## 2022-04-05 RX ORDER — METOPROLOL TARTRATE 50 MG
0.5 TABLET ORAL
Qty: 0 | Refills: 0 | DISCHARGE

## 2022-04-05 RX ORDER — AMPICILLIN SODIUM AND SULBACTAM SODIUM 250; 125 MG/ML; MG/ML
3 INJECTION, POWDER, FOR SUSPENSION INTRAMUSCULAR; INTRAVENOUS ONCE
Refills: 0 | Status: COMPLETED | OUTPATIENT
Start: 2022-04-05 | End: 2022-04-06

## 2022-04-05 RX ORDER — VALACYCLOVIR 500 MG/1
500 TABLET, FILM COATED ORAL DAILY
Refills: 0 | Status: DISCONTINUED | OUTPATIENT
Start: 2022-04-05 | End: 2022-04-09

## 2022-04-05 RX ORDER — MORPHINE SULFATE 50 MG/1
4 CAPSULE, EXTENDED RELEASE ORAL ONCE
Refills: 0 | Status: DISCONTINUED | OUTPATIENT
Start: 2022-04-05 | End: 2022-04-05

## 2022-04-05 RX ORDER — AMPICILLIN SODIUM AND SULBACTAM SODIUM 250; 125 MG/ML; MG/ML
3 INJECTION, POWDER, FOR SUSPENSION INTRAMUSCULAR; INTRAVENOUS EVERY 6 HOURS
Refills: 0 | Status: DISCONTINUED | OUTPATIENT
Start: 2022-04-06 | End: 2022-04-09

## 2022-04-05 RX ORDER — ATORVASTATIN CALCIUM 80 MG/1
1 TABLET, FILM COATED ORAL
Qty: 0 | Refills: 0 | DISCHARGE

## 2022-04-05 RX ORDER — METOPROLOL TARTRATE 50 MG
50 TABLET ORAL DAILY
Refills: 0 | Status: DISCONTINUED | OUTPATIENT
Start: 2022-04-05 | End: 2022-04-06

## 2022-04-05 RX ADMIN — Medication 900 MILLIGRAM(S): at 17:25

## 2022-04-05 RX ADMIN — Medication 100 MILLIGRAM(S): at 16:54

## 2022-04-05 RX ADMIN — MORPHINE SULFATE 4 MILLIGRAM(S): 50 CAPSULE, EXTENDED RELEASE ORAL at 16:55

## 2022-04-05 RX ADMIN — MORPHINE SULFATE 4 MILLIGRAM(S): 50 CAPSULE, EXTENDED RELEASE ORAL at 19:05

## 2022-04-05 RX ADMIN — MORPHINE SULFATE 4 MILLIGRAM(S): 50 CAPSULE, EXTENDED RELEASE ORAL at 16:43

## 2022-04-05 NOTE — ED PROVIDER NOTE - OBJECTIVE STATEMENT
HPI- Patient is a 61 y.o female with PMHx of MGUS, RA, aortitis who presents to ED as transfer from [facility] for OMFS for Dental abscess with facial cellulitis. Pt states for 1 month she has been having Lt upper tooth pain. Over past week she noticed some Lt side facial swelling. Pt went to [facility] 4/4 and had CT that only showed cellulitis and was discharged on augmentin. Pt returned to [facility] ED with worsening pain, swelling and redness of Lt side of face. Had repeat CT that now was showing dental abscess approx 1 cm. w/ facial cellulitis. ?OM. Last time patient saw dentist was in july in [place] for cleaning. States she doesn't have dental insurance anymore.  Pt denies fevers, chills, n/v/d, HA, eye pain, visual changes, cp, sob, neck pain, drooling, dysphagia or any other complaints.

## 2022-04-05 NOTE — ED PROVIDER NOTE - ATTENDING CONTRIBUTION TO CARE
61 y.o female with PMHx of MGUS, RA, aortitis who presents to ED as transfer from [facility] for OMFS for Dental abscess with facial cellulitis. Pt states for 1 month she has been having Lt upper tooth pain. Over past week she noticed some Lt side facial swelling. Pt went to [facility] 4/4 and had CT that only showed cellulitis and was discharged on augmentin. Pt returned to [facility] ED with worsening pain, swelling and redness of Lt side of face. Had repeat CT that now was showing dental abscess approx 1 cm. w/ facial cellulitis. ?OM. Last time patient saw dentist was in july in [place] for cleaning. States she doesn't have dental insurance anymore.  Pt denies fevers, chills, n/v/d, HA, eye pain, visual changes, cp, sob, neck pain, drooling, dysphagia or any other complaints.

## 2022-04-05 NOTE — ED ADULT NURSE NOTE - OBJECTIVE STATEMENT
pt seen in ED yesteday and diagnosed with facial cellulitis, started on rx augmentin and percocet. pt states left facial swelling worse today. pt also c/o dental and  facial pain, difficulty swallowing on left side. pt speaking in full sentences. no drooling noted.

## 2022-04-05 NOTE — ED PROVIDER NOTE - CLINICAL SUMMARY MEDICAL DECISION MAKING FREE TEXT BOX
DDx: worsening cellulitis vs absecess gland   Plan: CBC, CMP, lactate, blood culture, CT head, IV antibiotics, and reassess. DDx: worsening cellulitis vs abscess/ osteomyelitis/ ro sepsis  Plan: CBC, CMP, lactate, blood culture, CT head, IV antibiotics, and reassess.

## 2022-04-05 NOTE — ED PROVIDER NOTE - PHYSICAL EXAMINATION
Vital signs reviewed.   CONSTITUTIONAL: Well-appearing; well-nourished; in no apparent distress. Non-toxic appearing.   HEAD: Normocephalic, atraumatic.  EYES: PERRL, EOM intact, conjunctiva and sclera WNL.  ENT: normal nose; no rhinorrhea; normal pharynx with no tonsillar hypertrophy, no erythema, no exudate, no lymphadenopathy. No gingival edema noted. +TTP noted Lt first molar. Uvula midline. No obvious abscess noted.   NECK/LYMPH: Supple; non-tender  CARD: Normal S1, S2; no murmurs, rubs, or gallops noted.  RESP: Normal chest excursion with respiration; breath sounds clear and equal bilaterally; no wheezes, rhonchi, or rales.  EXT/MS: moves all extremities  SKIN: +Lt maxiallary and preseptal cellulitis with swelling noted.   NEURO: Awake, alert, oriented x 3, no gross deficits   PSYCH: Normal mood; appropriate affect.

## 2022-04-05 NOTE — ED PROVIDER NOTE - PROGRESS NOTE DETAILS
On patient CT today, it is now noted that there is an area concerning for abscess as well as osteomyelitis coming from L. molar. Pt started on antibiotics, and OMFS consulted, and will transfer to [facility] for further care. Pt improved after medications, and is comfortable and stable for transfer.

## 2022-04-05 NOTE — ED ADULT TRIAGE NOTE - CHIEF COMPLAINT QUOTE
pt transferred from [facility] for dental. pt diagnosed with facial cellulitis.  + swelling noted to face.  received clinda 900mg IV, morphine 4mg PTA

## 2022-04-05 NOTE — ED PROVIDER NOTE - OBJECTIVE STATEMENT
61 year old female with PMH of aortitis and HLD who presents to the ED for worsening symptoms since recent discharge. Pt was recently discharged x1 day ago and presents today in the ED for worsening facial swelling and redness. Pt reports taking prescribed medication with no relief of symptoms. CT showed no abscess from last visit. Pt denies fever, difficulty breathing, and difficulty controlling secretions.

## 2022-04-05 NOTE — ED PROVIDER NOTE - CLINICAL SUMMARY MEDICAL DECISION MAKING FREE TEXT BOX
Patient is a 61 y.o female with PMHx of MGUS, RA, aortitis who presents to ED as transfer from [facility] for OMFS for Dental abscess with facial cellulitis.     DDx- dental abscess with facial cellulitis. Labs from [facility] reviewed and wnl. OMFS consulted. Likely plan for admit vs CDU.

## 2022-04-05 NOTE — ED PROVIDER NOTE - ENMT, MLM
Airway patent, Nasal mucosa clear. Mouth with normal mucosa. Throat has no vesicles, no oropharyngeal exudates and uvula is midline. Left side of face swollen red and tender.

## 2022-04-05 NOTE — ED ADULT TRIAGE NOTE - CHIEF COMPLAINT QUOTE
Pt c/o L side of face swelling. was seen at [facility] and dx facial cellulitis. states took antibiotics, and pain killers, but not helping, swelling got worse.

## 2022-04-05 NOTE — ED PROVIDER NOTE - NS ED ATTENDING STATEMENT MOD
Attending with This was a shared visit with the ANGELICA. I reviewed and verified the documentation and independently performed the documented:

## 2022-04-05 NOTE — ED ADULT NURSE NOTE - NSIMPLEMENTINTERV_GEN_ALL_ED
Implemented All Fall Risk Interventions:  [place] to call system. Call bell, personal items and telephone within reach. Instruct patient to call for assistance. Room bathroom lighting operational. Non-slip footwear when patient is off stretcher. Physically safe environment: no spills, clutter or unnecessary equipment. Stretcher in lowest position, wheels locked, appropriate side rails in place. Provide visual cue, wrist band, yellow gown, etc. Monitor gait and stability. Monitor for mental status changes and reorient to person, place, and time. Review medications for side effects contributing to fall risk. Reinforce activity limits and safety measures with patient and family.

## 2022-04-05 NOTE — ED ADULT NURSE NOTE - SUICIDE SCREENING DEPRESSION
[de-identified] : cough [FreeTextEntry6] : cough\par no fever\par nausea x 2 days\par is Covid vaccinated\par last dose in May Negative

## 2022-04-05 NOTE — ED PROVIDER NOTE - PROGRESS NOTE DETAILS
OMFS states to place in CDU for iv abx and plan for tooth extraction and i&d in AM. They have low suspicion for OM- don't feel MRI is necessary.  CDU PA saw patient and accepted. Will place in CDU. Pt agreeable to stay.

## 2022-04-06 ENCOUNTER — TRANSCRIPTION ENCOUNTER (OUTPATIENT)
Age: 62
End: 2022-04-06

## 2022-04-06 ENCOUNTER — RESULT REVIEW (OUTPATIENT)
Age: 62
End: 2022-04-06

## 2022-04-06 LAB
CRP SERPL-MCNC: <3 MG/L — SIGNIFICANT CHANGE UP
CULTURE RESULTS: NO GROWTH — SIGNIFICANT CHANGE UP
SPECIMEN SOURCE: SIGNIFICANT CHANGE UP

## 2022-04-06 PROCEDURE — 99220: CPT

## 2022-04-06 RX ORDER — ACETAMINOPHEN 500 MG
650 TABLET ORAL ONCE
Refills: 0 | Status: COMPLETED | OUTPATIENT
Start: 2022-04-06 | End: 2022-04-06

## 2022-04-06 RX ORDER — METOPROLOL TARTRATE 50 MG
25 TABLET ORAL DAILY
Refills: 0 | Status: DISCONTINUED | OUTPATIENT
Start: 2022-04-06 | End: 2022-04-09

## 2022-04-06 RX ORDER — MORPHINE SULFATE 50 MG/1
4 CAPSULE, EXTENDED RELEASE ORAL ONCE
Refills: 0 | Status: DISCONTINUED | OUTPATIENT
Start: 2022-04-06 | End: 2022-04-06

## 2022-04-06 RX ORDER — IBUPROFEN 200 MG
600 TABLET ORAL ONCE
Refills: 0 | Status: COMPLETED | OUTPATIENT
Start: 2022-04-06 | End: 2022-04-06

## 2022-04-06 RX ORDER — KETOROLAC TROMETHAMINE 30 MG/ML
30 SYRINGE (ML) INJECTION ONCE
Refills: 0 | Status: DISCONTINUED | OUTPATIENT
Start: 2022-04-06 | End: 2022-04-06

## 2022-04-06 RX ADMIN — Medication 600 MILLIGRAM(S): at 14:50

## 2022-04-06 RX ADMIN — Medication 650 MILLIGRAM(S): at 14:50

## 2022-04-06 RX ADMIN — AMPICILLIN SODIUM AND SULBACTAM SODIUM 200 GRAM(S): 250; 125 INJECTION, POWDER, FOR SUSPENSION INTRAMUSCULAR; INTRAVENOUS at 15:50

## 2022-04-06 RX ADMIN — Medication 650 MILLIGRAM(S): at 14:06

## 2022-04-06 RX ADMIN — Medication 30 MILLIGRAM(S): at 22:38

## 2022-04-06 RX ADMIN — Medication 600 MILLIGRAM(S): at 14:06

## 2022-04-06 RX ADMIN — AMPICILLIN SODIUM AND SULBACTAM SODIUM 200 GRAM(S): 250; 125 INJECTION, POWDER, FOR SUSPENSION INTRAMUSCULAR; INTRAVENOUS at 09:36

## 2022-04-06 RX ADMIN — AMPICILLIN SODIUM AND SULBACTAM SODIUM 200 GRAM(S): 250; 125 INJECTION, POWDER, FOR SUSPENSION INTRAMUSCULAR; INTRAVENOUS at 02:41

## 2022-04-06 RX ADMIN — Medication 30 MILLIGRAM(S): at 22:08

## 2022-04-06 RX ADMIN — Medication 25 MILLIGRAM(S): at 05:49

## 2022-04-06 RX ADMIN — VALACYCLOVIR 500 MILLIGRAM(S): 500 TABLET, FILM COATED ORAL at 13:25

## 2022-04-06 RX ADMIN — AMPICILLIN SODIUM AND SULBACTAM SODIUM 200 GRAM(S): 250; 125 INJECTION, POWDER, FOR SUSPENSION INTRAMUSCULAR; INTRAVENOUS at 22:07

## 2022-04-06 RX ADMIN — MORPHINE SULFATE 4 MILLIGRAM(S): 50 CAPSULE, EXTENDED RELEASE ORAL at 06:43

## 2022-04-06 RX ADMIN — VALACYCLOVIR 500 MILLIGRAM(S): 500 TABLET, FILM COATED ORAL at 05:48

## 2022-04-06 RX ADMIN — MORPHINE SULFATE 4 MILLIGRAM(S): 50 CAPSULE, EXTENDED RELEASE ORAL at 06:27

## 2022-04-06 NOTE — CONSULT NOTE ADULT - ASSESSMENT
Assessment/Plan: 61yFemale with vestibular abscess a/w necrotic tooth #14.     - Rec CDU tonight for IV abx. Pt to have I&D, extraction in OMFS clinic in AM.   - Rec unasyn 3g q6h   - Rec Pain Control  - Rec Peridex rinse BID x 2 weeks  - No pressure to face, ice to face  Case discussed with attending.

## 2022-04-06 NOTE — ED CDU PROVIDER INITIAL DAY NOTE - DATE/TIME 1
[facility] is calling because Ruby has an appointment at 1;00 pm today. Patient pain is very mild and [facility] would like to know if Evangelina still needs to get seen? Please ask Dr. Guthrie.    06-Apr-2022 10:14

## 2022-04-06 NOTE — ED CDU PROVIDER INITIAL DAY NOTE - NS ED ATTENDING STATEMENT MOD
This was a shared visit with the ANGELICA. I reviewed and verified the documentation and independently performed the documented:

## 2022-04-06 NOTE — ED CDU PROVIDER INITIAL DAY NOTE - PROGRESS NOTE DETAILS
Pt seen by OMFS extracted tooth #14, penrose placed, recommending 1 dose IV abx prior to d/c with sinus precautions, pt to f/u in OMFS clinic on Friday to have drain removed

## 2022-04-06 NOTE — ED CDU PROVIDER INITIAL DAY NOTE - ATTENDING CONTRIBUTION TO CARE
61 y.o female with PMHx of MGUS, RA, aortitis, hld, giant cell vasculitis who presents to ED as transfer from [facility] for OMFS for Dental abscess with facial cellulitis. Pt states for 1 month she has been having Lt upper tooth pain. Over past week she noticed some Lt side facial swelling. Pt went to [facility] 4/4 and had CT that only showed cellulitis and was discharged on augmentin. Pt returned to [facility] ED with worsening pain, swelling and redness of Lt side of face. Had repeat CT that now was showing dental abscess approx 1 cm. w/ facial cellulitis. ?OM. Last time patient saw dentist was in july in [place] for cleaning. States she doesn't have dental insurance anymore.  Pt denies fevers, chills, n/v/d, HA, eye pain, visual changes, cp, sob, neck pain, drooling, dysphagia or any other complaints.  IN ER was seen byomfs who wants pt to go to cdu for IV ABX, and will take to the clinic in the AM fr tooth extraction, abscess drainage

## 2022-04-06 NOTE — ED CDU PROVIDER INITIAL DAY NOTE - OBJECTIVE STATEMENT
61 y.o female with PMHx of MGUS, RA, aortitis, hld, giant cell vasculitis who presents to ED as transfer from [facility] for OMFS for Dental abscess with facial cellulitis. Pt states for 1 month she has been having Lt upper tooth pain. Over past week she noticed some Lt side facial swelling. Pt went to [facility] 4/4 and had CT that only showed cellulitis and was discharged on augmentin. Pt returned to [facility] ED with worsening pain, swelling and redness of Lt side of face. Had repeat CT that now was showing dental abscess approx 1 cm. w/ facial cellulitis. ?OM. Last time patient saw dentist was in july in [place] for cleaning. States she doesn't have dental insurance anymore.  Pt denies fevers, chills, n/v/d, HA, eye pain, visual changes, cp, sob, neck pain, drooling, dysphagia or any other complaints.  IN ER was seen byomfs who wants pt to go to cdu for IV ABX, and will take to the clinic in the AM fr tooth extraction, abscess drainage

## 2022-04-06 NOTE — ED CDU PROVIDER INITIAL DAY NOTE - PHYSICAL EXAMINATION
Vital signs reviewed.   CONSTITUTIONAL: Well-appearing; well-nourished; in no apparent distress. Non-toxic appearing.   HEAD: Normocephalic, atraumatic.  EYES: PERRL, EOM intact, conjunctiva and sclera WNL.  ENT: normal nose; no rhinorrhea; normal pharynx with no tonsillar hypertrophy, no erythema, no exudate, no lymphadenopathy. No gingival edema noted. +TTP noted Lt first molar. Uvula midline. No obvious abscess noted.   NECK/LYMPH: Supple; non-tender  CARD: Normal S1, S2; no murmurs, rubs, or gallops noted.  RESP: Normal chest excursion with respiration; breath sounds clear and equal bilaterally; no wheezes, rhonchi, or rales.  EXT/MS: moves all extremities  SKIN: +Lt maxiallary and preseptal cellulitis with swelling noted.   NEURO: Awake, alert, oriented x 3, no gross deficits   PSYCH: Normal mood; appropriate affect.  - antonio lee

## 2022-04-06 NOTE — ED ADULT NURSE REASSESSMENT NOTE - NS ED NURSE REASSESS COMMENT FT1
Patient A&Ox4, resting in stretcher, respirations even and unlabored. Patient comfortable. Vitals stable. IV line intact and patent. Stretcher in lowest position, wheels locked, appropriate side rails in place, call bell in reach.

## 2022-04-06 NOTE — CONSULT NOTE ADULT - SUBJECTIVE AND OBJECTIVE BOX
OMFS Consult Note   #[id number]     HPI: 61 year old female with PMH of  MGUS, RA, HLD, possible aortitis who presents to the ED for worsening symptoms since recent discharge. Pt was recently discharged x1 day ago and presents today in the ED for worsening facial swelling and redness. Pt reports taking prescribed medication with no relief of symptoms. CT showed no abscess from last visit. Pt denies fever, difficulty breathing, and difficulty controlling secretions.    PAST MEDICAL & SURGICAL HISTORY:  Genital herpes    KIM (obstructive sleep apnea)    Hepatitis C carrier    Asthma    Arrhythmia    Hyperthyroidism    Aortitis    TB lung, latent    GERD (gastroesophageal reflux disease)    HCV (hepatitis C virus)    Carpal tunnel syndrome, right    S/P LASIK (laser assisted in situ keratomileusis) of both eyes    Status post breast reduction      MEDICATIONS  (STANDING):  ampicillin/sulbactam  IVPB 3 Gram(s) IV Intermittent once  ampicillin/sulbactam  IVPB 3 Gram(s) IV Intermittent every 6 hours  metoprolol succinate ER 50 milliGRAM(s) Oral daily  valACYclovir 500 milliGRAM(s) Oral daily    MEDICATIONS  (PRN):    Allergies    No Known Allergies    Intolerances      FAMILY HISTORY:  Family history of lymphoma (Sibling)    Family history of heart attack (Sibling, Father)    Family history of cardiac arrhythmia (Sibling)    FH: CAD (coronary artery disease) (Father, Sibling)      *SOCIAL HISTORY: Denies ETOH use, Tobacco, drugs    * Review of Systems: Denies fever, chills. Denies Nausea/vomiting/headache. Denies CP, SOB, cough. Denies palpitations. Denies blurred vision/double vision. Denies dysphagia, dyspnea. Denies paresthesia.     Vital Signs Last 24 Hrs  T(C): 36.9 (2022 19:37), Max: 36.9 (2022 19:37)  T(F): 98.4 (2022 19:37), Max: 98.4 (2022 19:37)  HR: 75 (2022 19:37) (66 - 88)  BP: 162/93 (2022 19:37) (157/95 - 189/96)  BP(mean): --  RR: 18 (2022 19:37) (15 - 20)  SpO2: 94% (2022 19:37) (94% - 99%)    Physical Exam:  Gen: AAox3, NAD, NC/AT  EOE: Firm L facial swelling extending under eye, overlying erythema, warm to touch. EOMI, no changes in vision. Inferior border of mandible palpable throughout.   Intraoral Exam: OLAF: ( 30mm , fluctuant vestibular swelling left posterior maxilla. #14 most tender to percussion. No purulence expressed. FOM soft, NT.   LABS:                        15.2   6.29  )-----------( 219      ( 2022 16:24 )             45.5     04-    141  |  105  |  11  ----------------------------<  94  3.8   |  32<H>  |  0.64    Ca    8.8      2022 16:24    TPro  7.2  /  Alb  4.0  /  TBili  0.5  /  DBili  x   /  AST  21  /  ALT  29  /  AlkPhos  70  04-05    Urinalysis Basic - ( 2022 17:10 )    Color: Yellow / Appearance: Slightly Turbid / S.020 / pH: x  Gluc: x / Ketone: Negative  / Bili: Negative / Urobili: Negative mg/dL   Blood: x / Protein: 15 mg/dL / Nitrite: Negative   Leuk Esterase: Small / RBC: 0-2 /HPF / WBC 11-25   Sq Epi: x / Non Sq Epi: Moderate / Bacteria: Moderate      PT/INR - ( 2022 16:24 )   PT: 12.6 sec;   INR: 1.05 ratio         PTT - ( 2022 16:24 )  PTT:35.2 sec        CT Maxillofacial:   1. No facial bone fracture.  2. Findings concerning for carious dentition involving the left maxillary first smaller with osteomyelitis along the medial aspect of the maxilla in this location (as appreciated on axial image #90, series 2).  3. Cellulitis involving the subcutaneous soft tissue in the left maxillary region with suggestion of a small abscess collection measuring approximately 1 cm in diameter.  4. In addition, the left piriform sinus is less well aerated compared with the right. Consider direct visualization for further characterization of this finding.

## 2022-04-06 NOTE — ED CDU PROVIDER INITIAL DAY NOTE - MEDICAL DECISION MAKING DETAILS
61 y.o female with PMHx of MGUS, RA, aortitis, hld, giant cell vasculitis who presents to ED as transfer from [facility] for OMFS for Dental abscess with facial cellulitis. Pt states for 1 month she has been having Lt upper tooth pain. Over past week she noticed some Lt side facial swelling. Pt went to [facility] 4/4 and had CT that only showed cellulitis and was discharged on augmentin. Pt returned to [facility] ED with worsening pain, swelling and redness of Lt side of face. Had repeat CT that now was showing dental abscess approx 1 cm. w/ facial cellulitis. ?OM. Last time patient saw dentist was in july in [place] for cleaning. States she doesn't have dental insurance anymore.  Pt denies fevers, chills, n/v/d, HA, eye pain, visual changes, cp, sob, neck pain, drooling, dysphagia or any other complaints.  IN ER was seen byomfs who wants pt to go to cdu for IV ABX, and will take to the clinic in the AM fr tooth extraction, abscess drainage

## 2022-04-06 NOTE — CHART NOTE - NSCHARTNOTEFT_GEN_A_CORE
60 yo F with Left facial abscess related to tooth #14.  Procedure- Extraction of #14, Incision and drainage with possible biopsy of alveolar bone  Consent was obtained. Time out was performed    Total of 2 carpules of Lidocaine 1:100K of epinephrine  1.5 Carpules of Septocaine 1:100 K of epinehrine  Gave Left Posterior Superior Alveolar Nerve block with infiltration of vestibule + Greater palatine nerve block  Raised full thickness mucoperiosteal flap from posterior aspect of tooth #14 and advanced proximally to tooth #13.  Dissected superiorly along maxillary bone and obtained ~ 3 ccs of purulence.   Using elevators and foreceps, removed tooth #14 in normal OMFS fashion.  Curettaged extraction site and sent sharp alveolar bone in formalin to oral pathology.  Irrigated with peridex and removed sharp edges.  Placed penrose drain and sutured to gingival tissue with 3-0 silk.    Hemostasis was achieved.   Pt tolerated procedure well.     Post operative precautions given : Sinus precautions, no heavy lifting, no spitting, motrin 600 mg q6 hrs for pain.    Plan:  - Administer 1 more dose of IV Unasyin   - OK to discharge on Augmentin  - Pt should follow up in OMFS clinic on friday 4/6/22 for penrose drain removal.  Office # [phone].

## 2022-04-07 VITALS
HEART RATE: 74 BPM | TEMPERATURE: 98 F | SYSTOLIC BLOOD PRESSURE: 145 MMHG | OXYGEN SATURATION: 100 % | DIASTOLIC BLOOD PRESSURE: 96 MMHG | RESPIRATION RATE: 19 BRPM

## 2022-04-07 PROCEDURE — 99217: CPT | Mod: FS

## 2022-04-07 RX ADMIN — AMPICILLIN SODIUM AND SULBACTAM SODIUM 200 GRAM(S): 250; 125 INJECTION, POWDER, FOR SUSPENSION INTRAMUSCULAR; INTRAVENOUS at 11:02

## 2022-04-07 RX ADMIN — AMPICILLIN SODIUM AND SULBACTAM SODIUM 200 GRAM(S): 250; 125 INJECTION, POWDER, FOR SUSPENSION INTRAMUSCULAR; INTRAVENOUS at 05:01

## 2022-04-07 RX ADMIN — Medication 25 MILLIGRAM(S): at 05:36

## 2022-04-07 NOTE — ED CDU PROVIDER SUBSEQUENT DAY NOTE - ATTENDING CONTRIBUTION TO CARE
Attending/Blas: 60 yo F h/o MGUS, RA, aortitis, Giant Cell vasculitis who was transferred from [facility] for with a dental abscess and facial cellulitis. CT revealed ~1 cm abscess and osteomyelitis. Pt evaluated by OMFS who performed an I&D and placed a drain. Pt started on IV Abx and to be revaluated by OMFS for d/c to home with OP follow up. Attending/Blas: 62 yo F h/o MGUS, RA, aortitis, Giant Cell vasculitis who was transferred from [facility] for with a dental abscess and facial cellulitis. CT revealed ~1 cm abscess and osteomyelitis. Pt evaluated by OMFS who performed an I&D and placed a drain. Pt started on IV Abx and to be revaluated by OMFS for d/c to home with OP follow up.     Update: Discussed CT results with OMFS, they have low suspicion for osteo and recommend d/c with follow up tomorrow in their clinic for eval and removal of drain. Pt reports improved symptoms including decrease swelling and pain. Pt showed pill bottle with 10 days of Augmentin.    PE: NAD; PERRL/EOMI, no proptosis, non-icterus, +left facial; appearing, supple, no CHELY, no JVD, RRR, CTAB; Abd-soft, NT/ND, no HSM; no LE edema, A&Ox3, nonfocal; Skin-warm/dry

## 2022-04-07 NOTE — ED CDU PROVIDER DISPOSITION NOTE - NSFOLLOWUPINSTRUCTIONS_ED_ALL_ED_FT
Follow up at Dental Clinic tomorrow for drain removal  Call to make appointment [phone]    Take Tylenol 650mg (Two 325 mg pills) every 4-6 hours as needed for pain.   Continue taking Augmentin at home    Worsening, continued or new concerning symptoms return to the emergency department.

## 2022-04-07 NOTE — ED CDU PROVIDER SUBSEQUENT DAY NOTE - HISTORY
· HPI Objective Statement: 61 y.o female with PMHx of MGUS, RA, aortitis, hld, giant cell vasculitis who presents to ED as transfer from [facility] for OMFS for Dental abscess with facial cellulitis. CT demonstrating dental abscess approx 1 cm. I&D drain placed by OMFS and cleared for DC. Pt not comfortable with DC. Pain management, IV abx and re-assess in am

## 2022-04-07 NOTE — ED CDU PROVIDER DISPOSITION NOTE - ATTENDING CONTRIBUTION TO CARE
ttending/Blas: 60 yo F h/o MGUS, RA, aortitis, Giant Cell vasculitis who was transferred from [facility] for with a dental abscess and facial cellulitis. CT revealed ~1 cm abscess and osteomyelitis. Pt evaluated by OMFS who performed an I&D and placed a drain. Pt started on IV Abx and to be revaluated by OMFS for d/c to home with OP follow up.     Update: Discussed CT results with OMFS, they have low suspicion for osteo and recommend d/c with follow up tomorrow in their clinic for eval and removal of drain. Pt reports improved symptoms including decrease swelling and pain. Pt showed pill bottle with 10 days of Augmentin. Attending/Blas: 60 yo F h/o MGUS, RA, aortitis, Giant Cell vasculitis who was transferred from [facility] for with a dental abscess and facial cellulitis. CT revealed ~1 cm abscess and osteomyelitis. Pt evaluated by OMFS who performed an I&D and placed a drain. Pt started on IV Abx and to be revaluated by OMFS for d/c to home with OP follow up.     Update: Discussed CT results with OMFS, they have low suspicion for osteo and recommend d/c with follow up tomorrow in their clinic for eval and removal of drain. Pt reports improved symptoms including decrease swelling and pain. Pt showed pill bottle with 10 days of Augmentin.

## 2022-04-07 NOTE — ED CDU PROVIDER DISPOSITION NOTE - CLINICAL COURSE
62 y/o female with pmhx of RA, HLD, giant cell arthritis, present sto ED c/o left sided jaw pain x 3 weeks, worse over last 2 days. Had CT max face at [place] which showed left maxillary osteomyelitis, with carious dentition, cellulitis and dental abscess. pt seen by OMFS at [facility], had I and D performed, placed in cdu for IV Unasyn, cleared by OMFS for dc today. pt clinically improved, no fevers or chills. as per omfs not concerned for osteomyelitis. 60 y/o female with pmhx of RA, HLD, giant cell arthritis, present sto ED c/o left sided jaw pain x 3 weeks, worse over last 2 days. Had CT max face at [place] which showed left maxillary osteomyelitis, with carious dentition, cellulitis and dental abscess. pt seen by OMFS at [facility], had I and D performed, placed in cdu for IV Unasyn, cleared by OMFS for dc today. pt clinically improved, no fevers or chills. as per omfs not concerned for osteomyelitis on CT.

## 2022-04-07 NOTE — ED CDU PROVIDER SUBSEQUENT DAY NOTE - NS ED ROS FT
· CONSTITUTIONAL: no fever and no chills.  · EYES: no discharge, no irritation, no pain, no redness, and no visual changes.  · ENMT: - - -  · Mouth/Throat [+]: abscess, swelling  · CARDIOVASCULAR: no chest pain and no edema.  · RESPIRATORY: no chest pain, no cough, and no shortness of breath.  · GASTROINTESTINAL: no abdominal pain, no bloating, no constipation, no diarrhea, no nausea and no vomiting.  · MUSCULOSKELETAL: no back pain, no gout, no musculoskeletal pain, no neck pain, and no weakness.

## 2022-04-07 NOTE — ED CDU PROVIDER DISPOSITION NOTE - PATIENT PORTAL LINK FT
You can access the FollowMyHealth Patient Portal offered by [facility] by registering at the following website: [email or web address]. By joining [employer]’s FollowMyHealth portal, you will also be able to view your health information using other applications (apps) compatible with our system.

## 2022-04-07 NOTE — PROGRESS NOTE ADULT - ASSESSMENT
61 year old female s/p extraction #14 and incision and drainage of left maxillary vestibular abscess.    -Suitable for discharge from an FS standpoint.  - Please call  to schedule an appointment for follow up in tomorrow for drain removal  - Recommend Augment 875mg BID 1 week  - Peridex Mouthrinse 15cc rinse and expirate BID   - Soft diet    [facility]  [id number]

## 2022-04-07 NOTE — ED ADULT NURSE NOTE - CHIEF COMPLAINT QUOTE
pt transferred from [place] for dental. pt diagnosed with facial cellulitis.  + swelling noted to face.  received clinda 900mg IV, morphine 4mg PTA

## 2022-04-07 NOTE — PROGRESS NOTE ADULT - SUBJECTIVE AND OBJECTIVE BOX
61y old female who presents with a chief complaint of       SUBJECTIVE / OVERNIGHT EVENTS: No acute events overnight. Patient subjectively feels much better. Has been tolerating a diet without issues. Patient has been ambulating and voiding w/o issues.       MEDICATIONS  (STANDING):  ampicillin/sulbactam  IVPB 3 Gram(s) IV Intermittent every 6 hours  metoprolol succinate ER 25 milliGRAM(s) Oral daily  valACYclovir 500 milliGRAM(s) Oral daily    MEDICATIONS  (PRN):        Vital Signs Last 24 Hrs  T(C): 36.5 (2022 05:36), Max: 37.1 (2022 09:58)  T(F): 97.7 (2022 05:36), Max: 98.7 (2022 09:58)  HR: 72 (2022 05:36) (64 - 75)  BP: 124/73 (2022 05:36) (120/61 - 170/86)  BP(mean): --  RR: 18 (2022 05:36) (17 - 18)  SpO2: 98% (2022 05:36) (98% - 100%)      CAPILLARY BLOOD GLUCOSE            I&O's Summary        PHYSICAL EXAM:  Gen: AAox3, NAD, NC/AT  EOE: Firm L facial swelling extending under eye improved, minimal erythema, EOMI, no changes in vision. Inferior border of mandible palpable throughout.   Intraoral Exam: OLAF: 30mm , penrose drain intact, serosang output, left posterior maxilla extraction site healing well.    LABS:                        15.2   6.29  )-----------( 219      ( 2022 16:24 )             45.5     04-    141  |  105  |  11  ----------------------------<  94  3.8   |  32<H>  |  0.64    Ca    8.8      2022 16:24    TPro  7.2  /  Alb  4.0  /  TBili  0.5  /  DBili  x   /  AST  21  /  ALT  29  /  AlkPhos  70  04-05    LIVER FUNCTIONS - ( 2022 16:24 )  Alb: 4.0 g/dL / Pro: 7.2 gm/dL / ALK PHOS: 70 U/L / ALT: 29 U/L / AST: 21 U/L / GGT: x           PT/INR - ( 2022 16:24 )   PT: 12.6 sec;   INR: 1.05 ratio         PTT - ( 2022 16:24 )  PTT:35.2 sec      Urinalysis Basic - ( 2022 17:10 )    Color: Yellow / Appearance: Slightly Turbid / S.020 / pH: x  Gluc: x / Ketone: Negative  / Bili: Negative / Urobili: Negative mg/dL   Blood: x / Protein: 15 mg/dL / Nitrite: Negative   Leuk Esterase: Small / RBC: 0-2 /HPF / WBC 11-25   Sq Epi: x / Non Sq Epi: Moderate / Bacteria: Moderate

## 2022-04-07 NOTE — ED CDU PROVIDER SUBSEQUENT DAY NOTE - MEDICAL DECISION MAKING DETAILS
61 y.o female with PMHx of MGUS, RA, aortitis, hld, giant cell vasculitis who presents to ED as transfer from [facility] for OMFS for Dental abscess with facial cellulitis. CT demonstrating dental abscess approx 1 cm. I&D drain placed by OMFS and cleared for DC. Pt not comfortable with DC. Pain management, IV abx and re-assess in am

## 2022-04-07 NOTE — ED CDU PROVIDER SUBSEQUENT DAY NOTE - PHYSICAL EXAMINATION
CONSTITUTIONAL: Well-appearing; well-nourished; in no apparent distress. Non-toxic appearing.   	HEAD: Normocephalic, atraumatic.  	EYES: PERRL, EOM intact, conjunctiva and sclera WNL.  	ENT: normal nose; no rhinorrhea; normal pharynx with no tonsillar hypertrophy, no erythema, no exudate, no lymphadenopathy. No gingival edema noted. +TTP noted Lt first molar. Uvula midline. No obvious abscess noted.   	NECK/LYMPH: Supple; non-tender  	CARD: Normal S1, S2; no murmurs, rubs, or gallops noted.  	RESP: Normal chest excursion with respiration; breath sounds clear and equal bilaterally; no wheezes, rhonchi, or rales.  	EXT/MS: moves all extremities  	SKIN: +Lt maxiallary and preseptal cellulitis with swelling noted.   	NEURO: Awake, alert, oriented x 3, no gross deficits   	PSYCH: Normal mood; appropriate affec

## 2022-04-08 ENCOUNTER — APPOINTMENT (OUTPATIENT)
Dept: RHEUMATOLOGY | Facility: CLINIC | Age: 62
End: 2022-04-08

## 2022-04-08 ENCOUNTER — EMERGENCY (EMERGENCY)
Facility: HOSPITAL | Age: 62
LOS: 1 days | Discharge: HOME | End: 2022-04-08
Attending: EMERGENCY MEDICINE | Admitting: EMERGENCY MEDICINE
Payer: COMMERCIAL

## 2022-04-08 ENCOUNTER — APPOINTMENT (OUTPATIENT)
Dept: RHEUMATOLOGY | Facility: CLINIC | Age: 62
End: 2022-04-08
Payer: COMMERCIAL

## 2022-04-08 VITALS
WEIGHT: 150 LBS | DIASTOLIC BLOOD PRESSURE: 77 MMHG | SYSTOLIC BLOOD PRESSURE: 149 MMHG | OXYGEN SATURATION: 98 % | HEART RATE: 85 BPM | TEMPERATURE: 94.6 F | BODY MASS INDEX: 24.11 KG/M2 | HEIGHT: 66 IN

## 2022-04-08 VITALS
HEART RATE: 78 BPM | TEMPERATURE: 99 F | DIASTOLIC BLOOD PRESSURE: 77 MMHG | RESPIRATION RATE: 16 BRPM | SYSTOLIC BLOOD PRESSURE: 153 MMHG | HEIGHT: 66 IN | OXYGEN SATURATION: 100 %

## 2022-04-08 DIAGNOSIS — G56.01 CARPAL TUNNEL SYNDROME, RIGHT UPPER LIMB: Chronic | ICD-10-CM

## 2022-04-08 DIAGNOSIS — Z98.89 OTHER SPECIFIED POSTPROCEDURAL STATES: Chronic | ICD-10-CM

## 2022-04-08 DIAGNOSIS — L03.211 CELLULITIS OF FACE: ICD-10-CM

## 2022-04-08 DIAGNOSIS — Z98.890 OTHER SPECIFIED POSTPROCEDURAL STATES: Chronic | ICD-10-CM

## 2022-04-08 LAB
ALBUMIN SERPL ELPH-MCNC: 5 G/DL
ALP BLD-CCNC: 71 U/L
ALT SERPL-CCNC: 19 U/L
ANION GAP SERPL CALC-SCNC: 11 MMOL/L
AST SERPL-CCNC: 19 U/L
BASOPHILS # BLD AUTO: 0.03 K/UL
BASOPHILS NFR BLD AUTO: 0.5 %
BILIRUB SERPL-MCNC: 0.4 MG/DL
BUN SERPL-MCNC: 14 MG/DL
CALCIUM SERPL-MCNC: 9.2 MG/DL
CHLORIDE SERPL-SCNC: 107 MMOL/L
CO2 SERPL-SCNC: 26 MMOL/L
CREAT SERPL-MCNC: 0.62 MG/DL
CRP SERPL-MCNC: <3 MG/L
EGFR: 101 ML/MIN/1.73M2
EOSINOPHIL # BLD AUTO: 0.05 K/UL
EOSINOPHIL NFR BLD AUTO: 0.9 %
ERYTHROCYTE [SEDIMENTATION RATE] IN BLOOD BY WESTERGREN METHOD: 2 MM/HR
GLUCOSE SERPL-MCNC: 89 MG/DL
HCT VFR BLD CALC: 42.9 %
HGB BLD-MCNC: 14.8 G/DL
IMM GRANULOCYTES NFR BLD AUTO: 0.2 %
LYMPHOCYTES # BLD AUTO: 2.55 K/UL
LYMPHOCYTES NFR BLD AUTO: 46.3 %
MAN DIFF?: NORMAL
MCHC RBC-ENTMCNC: 31.5 PG
MCHC RBC-ENTMCNC: 34.5 GM/DL
MCV RBC AUTO: 91.3 FL
MONOCYTES # BLD AUTO: 0.78 K/UL
MONOCYTES NFR BLD AUTO: 14.2 %
NEUTROPHILS # BLD AUTO: 2.09 K/UL
NEUTROPHILS NFR BLD AUTO: 37.9 %
PLATELET # BLD AUTO: 262 K/UL
POTASSIUM SERPL-SCNC: 4.4 MMOL/L
PROT SERPL-MCNC: 6.8 G/DL
RBC # BLD: 4.7 M/UL
RBC # FLD: 15.6 %
SODIUM SERPL-SCNC: 145 MMOL/L
WBC # FLD AUTO: 5.51 K/UL

## 2022-04-08 PROCEDURE — 99284 EMERGENCY DEPT VISIT MOD MDM: CPT

## 2022-04-08 PROCEDURE — 99213 OFFICE O/P EST LOW 20 MIN: CPT

## 2022-04-08 NOTE — ED PROVIDER NOTE - ENMT, MLM
Airway patent, Nasal mucosa clear. Mouth with normal mucosa. Throat has no vesicles, no oropharyngeal exudates and uvula is midline. Drain in place to left lower area. Mild swelling to left face area. No jawline tenderness.

## 2022-04-08 NOTE — ED PROVIDER NOTE - PATIENT PORTAL LINK FT
You can access the FollowMyHealth Patient Portal offered by [facility] by registering at the following website: [email or web address]. By joining [employer]’s FollowMyHealth portal, you will also be able to view your health information using other applications (apps) compatible with our system.

## 2022-04-08 NOTE — ED PROVIDER NOTE - CLINICAL SUMMARY MEDICAL DECISION MAKING FREE TEXT BOX
62 y/o female with PMHx of Arrhythmia, Asthma, Hepatitis, Hyperthyroidism, KIM, and latent TB who presented to the ED for dental drain removal.   Concern for dental drain removal  Dental consult

## 2022-04-08 NOTE — ED PROVIDER NOTE - PROGRESS NOTE DETAILS
Dr. Boyd: Spoke with Dental who noted pt is scheduled to be seen now in the Dental Clinic. Discussed with pt and had pt walked with Tech to the Dental clinic.

## 2022-04-08 NOTE — ED PROVIDER NOTE - OBJECTIVE STATEMENT
62 y/o female with PMHx of Arrhythmia, Asthma, Hepatitis, Hyperthyroidism, KIM, and latent TB who presented to the ED for dental drain removal. Pt states she had a drain placed 2 days ago and was told to return for removal. Pt notes swelling has gone down and is feeling better. Pt has been tolerated PO. Pt denies any fever, chills, nausea, vomiting, SOB, chest pain, or abd pain.

## 2022-04-08 NOTE — ED PROVIDER NOTE - NSFOLLOWUPINSTRUCTIONS_ED_ALL_ED_FT
YOU WERE SEEN FOR DENTAL DRAIN REMOVAL    YOU HAVE AN APPOINTMENT AT THE DENTAL CLINIC    YOU HAVE BEEN DIAGNOSED WITH DENTAL ABSCESS WITH DRAIN IN PLACE    TAKE TYLENOL 650mg EVERY 4 HOURS AS NEEDED FOR MILD PAIN  TAKE MOTRIN 600mg EVERY 6 HOURS AS NEEDED FOR MODERATE PAIN. TAKE WITH FOOD    FOLLOW UP WITH DENTAL CLINIC NOW.    RETURN TO THE EMERGENCY DEPARTMENT FOR ANY WORSENING SYMPTOMS.

## 2022-04-08 NOTE — ED ADULT TRIAGE NOTE - CHIEF COMPLAINT QUOTE
Pt coming in to have a dental drain removed. pt was seen here and had a tooth extraction and drain placed. was told to come in to have it removed. No complaints of chest pain, headache, nausea, dizziness, vomiting  SOB, fever, chills verbalized.

## 2022-04-11 ENCOUNTER — TRANSCRIPTION ENCOUNTER (OUTPATIENT)
Age: 62
End: 2022-04-11

## 2022-04-11 PROBLEM — Z11.59 SCREENING FOR VIRAL DISEASE: Status: ACTIVE | Noted: 2021-08-27

## 2022-04-11 LAB
CULTURE RESULTS: SIGNIFICANT CHANGE UP
CULTURE RESULTS: SIGNIFICANT CHANGE UP
SPECIMEN SOURCE: SIGNIFICANT CHANGE UP
SPECIMEN SOURCE: SIGNIFICANT CHANGE UP

## 2022-04-12 ENCOUNTER — TRANSCRIPTION ENCOUNTER (OUTPATIENT)
Age: 62
End: 2022-04-12

## 2022-04-13 ENCOUNTER — TRANSCRIPTION ENCOUNTER (OUTPATIENT)
Age: 62
End: 2022-04-13

## 2022-04-14 ENCOUNTER — TRANSCRIPTION ENCOUNTER (OUTPATIENT)
Age: 62
End: 2022-04-14

## 2022-04-14 ENCOUNTER — OUTPATIENT (OUTPATIENT)
Dept: OUTPATIENT SERVICES | Facility: HOSPITAL | Age: 62
LOS: 1 days | End: 2022-04-14
Payer: COMMERCIAL

## 2022-04-14 ENCOUNTER — APPOINTMENT (OUTPATIENT)
Dept: MRI IMAGING | Facility: IMAGING CENTER | Age: 62
End: 2022-04-14
Payer: COMMERCIAL

## 2022-04-14 DIAGNOSIS — Z98.89 OTHER SPECIFIED POSTPROCEDURAL STATES: Chronic | ICD-10-CM

## 2022-04-14 DIAGNOSIS — Z00.8 ENCOUNTER FOR OTHER GENERAL EXAMINATION: ICD-10-CM

## 2022-04-14 DIAGNOSIS — Z98.890 OTHER SPECIFIED POSTPROCEDURAL STATES: Chronic | ICD-10-CM

## 2022-04-14 DIAGNOSIS — G56.01 CARPAL TUNNEL SYNDROME, RIGHT UPPER LIMB: Chronic | ICD-10-CM

## 2022-04-14 PROCEDURE — 73721 MRI JNT OF LWR EXTRE W/O DYE: CPT

## 2022-04-14 PROCEDURE — 73721 MRI JNT OF LWR EXTRE W/O DYE: CPT | Mod: 26,RT

## 2022-04-18 ENCOUNTER — TRANSCRIPTION ENCOUNTER (OUTPATIENT)
Age: 62
End: 2022-04-18

## 2022-04-22 ENCOUNTER — TRANSCRIPTION ENCOUNTER (OUTPATIENT)
Age: 62
End: 2022-04-22

## 2022-04-26 ENCOUNTER — APPOINTMENT (OUTPATIENT)
Dept: RHEUMATOLOGY | Facility: CLINIC | Age: 62
End: 2022-04-26

## 2022-05-05 ENCOUNTER — APPOINTMENT (OUTPATIENT)
Dept: RHEUMATOLOGY | Facility: CLINIC | Age: 62
End: 2022-05-05
Payer: COMMERCIAL

## 2022-05-05 VITALS
HEART RATE: 105 BPM | SYSTOLIC BLOOD PRESSURE: 172 MMHG | WEIGHT: 151.4 LBS | HEIGHT: 66 IN | TEMPERATURE: 97.6 F | OXYGEN SATURATION: 98 % | DIASTOLIC BLOOD PRESSURE: 101 MMHG | BODY MASS INDEX: 24.33 KG/M2

## 2022-05-05 VITALS — DIASTOLIC BLOOD PRESSURE: 93 MMHG | SYSTOLIC BLOOD PRESSURE: 159 MMHG

## 2022-05-05 DIAGNOSIS — K04.7 PERIAPICAL ABSCESS W/OUT SINUS: ICD-10-CM

## 2022-05-05 PROCEDURE — 99214 OFFICE O/P EST MOD 30 MIN: CPT

## 2022-05-06 ENCOUNTER — TRANSCRIPTION ENCOUNTER (OUTPATIENT)
Age: 62
End: 2022-05-06

## 2022-05-06 ENCOUNTER — OUTPATIENT (OUTPATIENT)
Dept: OUTPATIENT SERVICES | Facility: HOSPITAL | Age: 62
LOS: 1 days | Discharge: HOME | End: 2022-05-06

## 2022-05-06 DIAGNOSIS — D47.2 MONOCLONAL GAMMOPATHY: ICD-10-CM

## 2022-05-06 DIAGNOSIS — Z98.89 OTHER SPECIFIED POSTPROCEDURAL STATES: Chronic | ICD-10-CM

## 2022-05-06 DIAGNOSIS — G56.01 CARPAL TUNNEL SYNDROME, RIGHT UPPER LIMB: Chronic | ICD-10-CM

## 2022-05-06 DIAGNOSIS — Z98.890 OTHER SPECIFIED POSTPROCEDURAL STATES: Chronic | ICD-10-CM

## 2022-05-06 LAB
ALBUMIN SERPL ELPH-MCNC: 4.6 G/DL
ALP BLD-CCNC: 78 U/L
ALT SERPL-CCNC: 41 U/L
ANION GAP SERPL CALC-SCNC: 13 MMOL/L
AST SERPL-CCNC: 29 U/L
BASOPHILS # BLD AUTO: 0.04 K/UL
BASOPHILS NFR BLD AUTO: 0.8 %
BILIRUB SERPL-MCNC: 0.6 MG/DL
BUN SERPL-MCNC: 13 MG/DL
CALCIUM SERPL-MCNC: 9.6 MG/DL
CHLORIDE SERPL-SCNC: 105 MMOL/L
CO2 SERPL-SCNC: 27 MMOL/L
CREAT SERPL-MCNC: 0.65 MG/DL
CRP SERPL-MCNC: <3 MG/L
EGFR: 100 ML/MIN/1.73M2
EOSINOPHIL # BLD AUTO: 0.11 K/UL
EOSINOPHIL NFR BLD AUTO: 2.3 %
ERYTHROCYTE [SEDIMENTATION RATE] IN BLOOD BY WESTERGREN METHOD: 2 MM/HR
GLUCOSE SERPL-MCNC: 94 MG/DL
HCT VFR BLD CALC: 45.3 %
HGB BLD-MCNC: 14.5 G/DL
IMM GRANULOCYTES NFR BLD AUTO: 0.2 %
LYMPHOCYTES # BLD AUTO: 1.22 K/UL
LYMPHOCYTES NFR BLD AUTO: 25.9 %
MAN DIFF?: NORMAL
MCHC RBC-ENTMCNC: 30 PG
MCHC RBC-ENTMCNC: 32 GM/DL
MCV RBC AUTO: 93.6 FL
MONOCYTES # BLD AUTO: 0.64 K/UL
MONOCYTES NFR BLD AUTO: 13.6 %
NEUTROPHILS # BLD AUTO: 2.69 K/UL
NEUTROPHILS NFR BLD AUTO: 57.2 %
PLATELET # BLD AUTO: 206 K/UL
POTASSIUM SERPL-SCNC: 4.4 MMOL/L
PROT SERPL-MCNC: 6.7 G/DL
RBC # BLD: 4.84 M/UL
RBC # FLD: 15.3 %
SODIUM SERPL-SCNC: 145 MMOL/L
WBC # FLD AUTO: 4.71 K/UL

## 2022-05-07 PROBLEM — K04.7 DENTAL ABSCESS: Status: ACTIVE | Noted: 2022-04-08

## 2022-05-11 ENCOUNTER — RESULT REVIEW (OUTPATIENT)
Age: 62
End: 2022-05-11

## 2022-05-11 ENCOUNTER — APPOINTMENT (OUTPATIENT)
Dept: HEMATOLOGY ONCOLOGY | Facility: CLINIC | Age: 62
End: 2022-05-11
Payer: COMMERCIAL

## 2022-05-11 VITALS
BODY MASS INDEX: 24.41 KG/M2 | TEMPERATURE: 97.4 F | HEART RATE: 72 BPM | SYSTOLIC BLOOD PRESSURE: 147 MMHG | DIASTOLIC BLOOD PRESSURE: 86 MMHG | RESPIRATION RATE: 18 BRPM | OXYGEN SATURATION: 99 % | WEIGHT: 151.22 LBS

## 2022-05-11 LAB
BASOPHILS # BLD AUTO: 0.03 K/UL — SIGNIFICANT CHANGE UP (ref 0–0.2)
BASOPHILS NFR BLD AUTO: 0.6 % — SIGNIFICANT CHANGE UP (ref 0–2)
EOSINOPHIL # BLD AUTO: 0.1 K/UL — SIGNIFICANT CHANGE UP (ref 0–0.5)
EOSINOPHIL NFR BLD AUTO: 2.1 % — SIGNIFICANT CHANGE UP (ref 0–6)
HCT VFR BLD CALC: 41.7 % — SIGNIFICANT CHANGE UP (ref 34.5–45)
HGB BLD-MCNC: 14 G/DL — SIGNIFICANT CHANGE UP (ref 11.5–15.5)
IMM GRANULOCYTES NFR BLD AUTO: 0 % — SIGNIFICANT CHANGE UP (ref 0–1.5)
LYMPHOCYTES # BLD AUTO: 1.58 K/UL — SIGNIFICANT CHANGE UP (ref 1–3.3)
LYMPHOCYTES # BLD AUTO: 33.1 % — SIGNIFICANT CHANGE UP (ref 13–44)
MCHC RBC-ENTMCNC: 30.8 PG — SIGNIFICANT CHANGE UP (ref 27–34)
MCHC RBC-ENTMCNC: 33.6 G/DL — SIGNIFICANT CHANGE UP (ref 32–36)
MCV RBC AUTO: 91.9 FL — SIGNIFICANT CHANGE UP (ref 80–100)
MONOCYTES # BLD AUTO: 0.5 K/UL — SIGNIFICANT CHANGE UP (ref 0–0.9)
MONOCYTES NFR BLD AUTO: 10.5 % — SIGNIFICANT CHANGE UP (ref 2–14)
NEUTROPHILS # BLD AUTO: 2.56 K/UL — SIGNIFICANT CHANGE UP (ref 1.8–7.4)
NEUTROPHILS NFR BLD AUTO: 53.7 % — SIGNIFICANT CHANGE UP (ref 43–77)
NRBC # BLD: 0 /100 WBCS — SIGNIFICANT CHANGE UP (ref 0–0)
PLATELET # BLD AUTO: 206 K/UL — SIGNIFICANT CHANGE UP (ref 150–400)
RBC # BLD: 4.54 M/UL — SIGNIFICANT CHANGE UP (ref 3.8–5.2)
RBC # FLD: 14.5 % — SIGNIFICANT CHANGE UP (ref 10.3–14.5)
WBC # BLD: 4.77 K/UL — SIGNIFICANT CHANGE UP (ref 3.8–10.5)
WBC # FLD AUTO: 4.77 K/UL — SIGNIFICANT CHANGE UP (ref 3.8–10.5)

## 2022-05-11 PROCEDURE — 99214 OFFICE O/P EST MOD 30 MIN: CPT

## 2022-05-12 NOTE — ASSESSMENT
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 \par \par She has no CRAB signs or symptoms and her most recent PET-CT imaging was in Jan 2022. Her disease consists mostly of elevated lambda light chain with an involved vs uninvolved ratio that remains < 100, which has improved slightly since her last visit. She does have a weak M-spike that is not quantifiable on recent blood work. Her last BM biopsy in 2/2021 showed 10% monotypic plasma cells which puts her on the border of MGUS/Smoldering Myeloma, both of which should be monitored and not treated with myeloma-directed therapies (pathology not verified here as of yet). At this time she does not meet revised IMWG criteria for multiple myeloma and remains on observation. \par \par She underwent a left posterior maxillary hard tissue biopsy on 4/6/22 that showed osteitis. She was treated for an oral abscess.\par \par \par Plan:\par - obtain 24-hour UPEP (not yet submitted)\par - Hold off additional BM biopsies at this time, can repeat if significant changes in immunoelectrophoresis studies or CBC\par - Monitor serum immunoelectrophoresis every 2-3 months\par - Follow-up with Rheum Dr Сергей Patino for management of vasculitis\par - Current smoker: Has tried multiple smoking cessation aids without success, \par - Next visit in 3 months\par \par _____\par I personally have spent a total of 35 minutes of time on the date of this encounter reviewing test results, documenting findings, providing education, coordinating care and directly consulting with the patient and/or designated family member.

## 2022-05-12 NOTE — PHYSICAL EXAM
[Restricted in physically strenuous activity but ambulatory and able to carry out work of a light or sedentary nature] : Status 1- Restricted in physically strenuous activity but ambulatory and able to carry out work of a light or sedentary nature, e.g., light house work, office work [Normal] : affect appropriate [de-identified] : Right leg / ankle brace

## 2022-05-12 NOTE — HISTORY OF PRESENT ILLNESS
[Disease:__________________________] : Disease: [unfilled] [Treatment Protocol] : Treatment Protocol [de-identified] : 60 yo post-menopausal female with a history of Hep C (s/p curative therapy), vasculitis / aortitis (active), migraines and MGUS (dx 2/2021) here to establish care with a new hematologist. She was previously seen by Dr Tobias Bocanegra who diagnosed her with MGUS after performing a myeloma workup. 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 No increase in blasts.\par Flow: Monoclonal lambda plasma cell population that is CD38+ encompassed < 1 % of analyzed WBCs\par Cytogenetics: Normal 46,XX female karyotype\par FISH: Positive for CCND1/IGH t(11;14) -- Negative for RB1 deletion, TP53 (17p13) deletion, duplication 1q21, FGFR3/IGH t(4;14) and IGH/MAF t(14;16)\par \par Imaging:\par Skeletal Survey 2/8/21: No lytic or blastic osseous lesions\par CT Chest (w/o contrast) 2/17/21: 3 mm subpleural nodule thought to be postinflammatory or benign lymph node. No destructive lesions or other significant findings.\par \par PET-CT 1/19/22: \par 1. FDG avidity along the walls of the thoracic and abdominal aorta, with activity extending into the right brachiocephalic and bilateral subclavian arteries, and bilateral iliac and femoral vessels. This is concerning for vasculitis. \par 2. Cystic lesions in bilateral breasts with associated FDG avid soft tissue in the anterior posterior aspects.\par 3. No abnormal FDG activity in bones\par \par Social Hx:\par , lives in [place], works as an , born in [place]\par Current Smoker \par No significant Alcohol use\par \par Allergies:\par NKDA\par \par ========================================================\par Prior Hematologist / Oncologist: Dr Tobias Damon ([facility])\par Rheumatologist: Dr Сергей Patino [de-identified] : N/A [de-identified] : See above [de-identified] : Positive for t(11;14) which is favorable in MGUG/Myeloma [FreeTextEntry1] : Observation [de-identified] : 2/9/22: Initial Visit. Ms Rigoberto presents today to establish care with a new hematology team connected to [facility]. Today she complains of severe neck, right ankle, leg/back pain limiting her ability to work and stand for long periods of time. She has had a spinal workup in the past with multiple herniated discs, and also has had steroid injections without significant improvement as of recent (last injection on Alhaji 3, 2022). She has had severe pain in her right ankle for many weeks now and she feels it may radiate from her back or leg. She denied any numbness. This has significantly affected her ability to function both at home and at work and descending altitude on the plane makes the pain unbearable. Recent PET-CT showed recurrent aortitis in Jan 2022. She denies any recent fevers, chills, night sweats, significant weight loss. She also reports that she was recently told that she has smoldering myeloma, which at this time does not appear to be true.\par \par 5/11/22: Follow- up. She is getting treated for her vasculitis with Dr Сергей Patino with no recent flare ups. She is now off steroids. She still has been in severe pain in her right ankle (uses a brace now), leg/back pain. Cataract surgery in left eye planned on 5/20/22. She is getting a mammogram and ultrasound on 5/31/22. Denies swelling in ankles even when flying. She bruises easily. Recently had a left tooth abscess that caused facial swelling 3 weeks ago.  She denies any recent fevers, chills, night sweats, significant weight loss. No new lumps or bumps. \par \par ___________\par A comprehensive review of systems was performed including constitutional, eyes, ENT, cardiovascular, respiratory, gastrointestinal, genitourinary, musculoskeletal, integumentary, neurological, psychiatric and hematologic / lymphatic. All pertinent positives are included in the H&P under interval history above and the remaining review of systems listed are negative.

## 2022-05-13 LAB
ALBUMIN SERPL ELPH-MCNC: 4.9 G/DL
ALP BLD-CCNC: 78 U/L
ALT SERPL-CCNC: 26 U/L
ANION GAP SERPL CALC-SCNC: 11 MMOL/L
AST SERPL-CCNC: 23 U/L
B2 MICROGLOB SERPL-MCNC: 2 MG/L
BILIRUB SERPL-MCNC: 0.5 MG/DL
BUN SERPL-MCNC: 12 MG/DL
CALCIUM SERPL-MCNC: 9.6 MG/DL
CHLORIDE SERPL-SCNC: 106 MMOL/L
CO2 SERPL-SCNC: 27 MMOL/L
CREAT SERPL-MCNC: 0.6 MG/DL
EGFR: 102 ML/MIN/1.73M2
GLUCOSE SERPL-MCNC: 99 MG/DL
POTASSIUM SERPL-SCNC: 4.6 MMOL/L
PROT SERPL-MCNC: 6.7 G/DL
SODIUM SERPL-SCNC: 144 MMOL/L

## 2022-05-17 RX ORDER — ZOSTER VACCINE RECOMBINANT, ADJUVANTED 50 MCG/0.5
50 KIT INTRAMUSCULAR
Qty: 1 | Refills: 1 | Status: ACTIVE | COMMUNITY
Start: 2022-02-13

## 2022-05-19 LAB
ALBUMIN MFR SERPL ELPH: 68.7 %
ALBUMIN SERPL-MCNC: 4.6 G/DL
ALBUMIN/GLOB SERPL: 2.2 RATIO
ALPHA1 GLOB MFR SERPL ELPH: 3.4 %
ALPHA1 GLOB SERPL ELPH-MCNC: 0.2 G/DL
ALPHA2 GLOB MFR SERPL ELPH: 11.5 %
ALPHA2 GLOB SERPL ELPH-MCNC: 0.8 G/DL
B-GLOBULIN MFR SERPL ELPH: 9.3 %
B-GLOBULIN SERPL ELPH-MCNC: 0.6 G/DL
DEPRECATED KAPPA LC FREE/LAMBDA SER: 0.09 RATIO
GAMMA GLOB FLD ELPH-MCNC: 0.5 G/DL
GAMMA GLOB MFR SERPL ELPH: 7.1 %
IGA SER QL IEP: 64 MG/DL
IGG SER QL IEP: 805 MG/DL
IGM SER QL IEP: 27 MG/DL
INTERPRETATION SERPL IEP-IMP: NORMAL
KAPPA LC CSF-MCNC: 9.25 MG/DL
KAPPA LC SERPL-MCNC: 0.79 MG/DL
M PROTEIN SPEC IFE-MCNC: NORMAL
PROT SERPL-MCNC: 6.7 G/DL
PROT SERPL-MCNC: 6.7 G/DL

## 2022-05-24 ENCOUNTER — TRANSCRIPTION ENCOUNTER (OUTPATIENT)
Age: 62
End: 2022-05-24

## 2022-05-24 LAB
CREAT 24H UR-MCNC: 0.7 G/24 H
CREAT ?TM UR-MCNC: 52 MG/DL
PROT 24H UR-MRATE: 8 MG/DL
PROT ?TM UR-MCNC: 24 HR
PROT UR-MCNC: 110 MG/24 H
SPECIMEN VOL 24H UR: 1375 ML

## 2022-05-30 LAB
ALBUPE: 9.8 %
ALPHA1UPE: 20 %
ALPHA2UPE: 13.4 %
BETAUPE: 12.4 %
CREAT 24H UR-MCNC: 0.7 G/24 H
CREATININE UR (MAYO): 52 MG/DL
GAMMAUPE: 44.4 %
IGA 24H UR QL IFE: NORMAL
KAPPA LC 24H UR QL: NORMAL
PROT PATTERN 24H UR ELPH-IMP: NORMAL
PROT UR-MCNC: 8 MG/DL
PROT UR-MCNC: 8 MG/DL
SPECIMEN VOL 24H UR: NORMAL ML
U PROTEIN QNT CALCULATION: 110 MG/24 H

## 2022-06-18 ENCOUNTER — APPOINTMENT (OUTPATIENT)
Dept: RHEUMATOLOGY | Facility: CLINIC | Age: 62
End: 2022-06-18
Payer: COMMERCIAL

## 2022-06-18 VITALS
BODY MASS INDEX: 24.27 KG/M2 | DIASTOLIC BLOOD PRESSURE: 84 MMHG | HEART RATE: 78 BPM | HEIGHT: 66 IN | OXYGEN SATURATION: 98 % | RESPIRATION RATE: 16 BRPM | WEIGHT: 151 LBS | TEMPERATURE: 97.2 F | SYSTOLIC BLOOD PRESSURE: 150 MMHG

## 2022-06-18 DIAGNOSIS — Z23 ENCOUNTER FOR IMMUNIZATION: ICD-10-CM

## 2022-06-18 PROCEDURE — 99214 OFFICE O/P EST MOD 30 MIN: CPT | Mod: 25

## 2022-06-18 PROCEDURE — 90471 IMMUNIZATION ADMIN: CPT

## 2022-06-18 PROCEDURE — 90750 HZV VACC RECOMBINANT IM: CPT | Mod: NC

## 2022-06-18 NOTE — PHYSICAL EXAM
[General Appearance - Alert] : alert [General Appearance - In No Acute Distress] : in no acute distress [Sclera] : the sclera and conjunctiva were normal [Auscultation Breath Sounds / Voice Sounds] : lungs were clear to auscultation bilaterally [Heart Sounds] : normal S1 and S2 [] : no rash [Oriented To Time, Place, And Person] : oriented to person, place, and time

## 2022-06-18 NOTE — ASSESSMENT
[FreeTextEntry1] : 1- Large Vessel Vasculitis \par \par Aortitis with wall thickening of TA Ao and bilateral LORI seen on CT A/P since 2/2021 \par IgG4 levels normal ,ALDAIR, ANCA and all serology negative, elevated ESR, CRP (on steroids)\par follow-up CTA of neck and chest were normal, MRA chest 8/20/2021 - no evidence of aortitis \par CTA chest 9/8/21, discussed with radiologist - Increased thickness in aortic wall compared to 5/21 imaging\par *PET CT 1/19/22: FDG avidity along the walls of the thoracic and abdominal aorta with activity extending into the right BC, bilateral SC arteries, bilateral iliac and femoral vessels.\par **Started TCZ on 2/25 \par \par 2- Osteopenia\par 3- Vaccination status/immunosuppressed status: \par COVID vaccine completed , booster :11/30/21 - second booster received 5/4\par PCV 13 -received, flu vaccine -received\par PPSV 23 administered 2/10/22\par Shingrix-first dose 2/25-**Second dose administered today 6/18/22\par 4- MGUS, following with hematologist \par 5-Dental abscess with osteomyelitis- resolved, cleared by dental \par Actemra resumed 4/24/22\par 6-Right ankle injury/ arthritis \par 7- High BP, sub optimal control on metoprolol \par \par \par [] Obtain monitoring blood work today\par [] check disease activity markers today\par [] continue with Actemra weekly (every Sunday)\par [] shingrix second dose administered today 6/18/22\par [] follow up with hematology as recommended, discuss recent results \par [] follow up with ankle specialist, surgery as last resort\par advised to reduce flights next month \par [] advised to follow up with cardiologist regarding better BP control\par to monitor BP at home\par [] smoking cessation counseling today \par \par \par RTO in 4-5 weeks

## 2022-06-18 NOTE — HISTORY OF PRESENT ILLNESS
[FreeTextEntry1] : # At today's visit:\par -5/21 underwent left eye cataract surgery \par doing well \par -following with ortho, s,p injections at right ankle\par -continues to take Actemra every Sunday \par -s/p follow up with hematology 5/11/22\par 24 hour UPEP \par

## 2022-06-21 ENCOUNTER — TRANSCRIPTION ENCOUNTER (OUTPATIENT)
Age: 62
End: 2022-06-21

## 2022-06-21 LAB
ALBUMIN SERPL ELPH-MCNC: 4.9 G/DL
ALP BLD-CCNC: 62 U/L
ALT SERPL-CCNC: 18 U/L
ANION GAP SERPL CALC-SCNC: 12 MMOL/L
AST SERPL-CCNC: 20 U/L
BILIRUB SERPL-MCNC: 0.5 MG/DL
BUN SERPL-MCNC: 15 MG/DL
CALCIUM SERPL-MCNC: 9.5 MG/DL
CHLORIDE SERPL-SCNC: 107 MMOL/L
CO2 SERPL-SCNC: 27 MMOL/L
CREAT SERPL-MCNC: 0.68 MG/DL
CRP SERPL-MCNC: <3 MG/L
EGFR: 99 ML/MIN/1.73M2
ERYTHROCYTE [SEDIMENTATION RATE] IN BLOOD BY WESTERGREN METHOD: 2 MM/HR
GLUCOSE SERPL-MCNC: 100 MG/DL
POTASSIUM SERPL-SCNC: 4.6 MMOL/L
PROT SERPL-MCNC: 6.5 G/DL
SODIUM SERPL-SCNC: 146 MMOL/L

## 2022-06-22 ENCOUNTER — TRANSCRIPTION ENCOUNTER (OUTPATIENT)
Age: 62
End: 2022-06-22

## 2022-06-28 ENCOUNTER — TRANSCRIPTION ENCOUNTER (OUTPATIENT)
Age: 62
End: 2022-06-28

## 2022-06-29 ENCOUNTER — TRANSCRIPTION ENCOUNTER (OUTPATIENT)
Age: 62
End: 2022-06-29

## 2022-07-21 ENCOUNTER — APPOINTMENT (OUTPATIENT)
Dept: RHEUMATOLOGY | Facility: CLINIC | Age: 62
End: 2022-07-21

## 2022-07-21 ENCOUNTER — LABORATORY RESULT (OUTPATIENT)
Age: 62
End: 2022-07-21

## 2022-07-21 VITALS
WEIGHT: 146.8 LBS | TEMPERATURE: 96.1 F | OXYGEN SATURATION: 98 % | BODY MASS INDEX: 23.59 KG/M2 | HEART RATE: 97 BPM | SYSTOLIC BLOOD PRESSURE: 160 MMHG | HEIGHT: 66 IN | DIASTOLIC BLOOD PRESSURE: 78 MMHG

## 2022-07-21 PROCEDURE — 99214 OFFICE O/P EST MOD 30 MIN: CPT

## 2022-07-21 RX ORDER — PREDNISONE 5 MG/1
5 TABLET ORAL
Qty: 90 | Refills: 3 | Status: DISCONTINUED | COMMUNITY
Start: 2021-04-29 | End: 2022-07-21

## 2022-07-21 RX ORDER — OMEPRAZOLE 40 MG/1
40 CAPSULE, DELAYED RELEASE ORAL
Qty: 30 | Refills: 3 | Status: COMPLETED | COMMUNITY
Start: 2021-07-28 | End: 2022-07-21

## 2022-07-26 ENCOUNTER — NON-APPOINTMENT (OUTPATIENT)
Age: 62
End: 2022-07-26

## 2022-07-27 ENCOUNTER — TRANSCRIPTION ENCOUNTER (OUTPATIENT)
Age: 62
End: 2022-07-27

## 2022-07-27 LAB
ALBUMIN SERPL ELPH-MCNC: 5.1 G/DL
ALP BLD-CCNC: 67 U/L
ALT SERPL-CCNC: 24 U/L
ANION GAP SERPL CALC-SCNC: 13 MMOL/L
AST SERPL-CCNC: 23 U/L
BASOPHILS # BLD AUTO: 0.02 K/UL
BASOPHILS NFR BLD AUTO: 0.6 %
BILIRUB SERPL-MCNC: 0.5 MG/DL
BUN SERPL-MCNC: 11 MG/DL
CALCIUM SERPL-MCNC: 9.9 MG/DL
CHLORIDE SERPL-SCNC: 105 MMOL/L
CO2 SERPL-SCNC: 24 MMOL/L
CREAT SERPL-MCNC: 0.71 MG/DL
CRP SERPL-MCNC: <3 MG/L
EGFR: 96 ML/MIN/1.73M2
EOSINOPHIL # BLD AUTO: 0.1 K/UL
EOSINOPHIL NFR BLD AUTO: 3.1 %
ERYTHROCYTE [SEDIMENTATION RATE] IN BLOOD BY WESTERGREN METHOD: < 2 MM/HR
GLUCOSE SERPL-MCNC: 99 MG/DL
HCT VFR BLD CALC: 45.5 %
HGB BLD-MCNC: 14.9 G/DL
IMM GRANULOCYTES NFR BLD AUTO: 0 %
LYMPHOCYTES # BLD AUTO: 1.51 K/UL
LYMPHOCYTES NFR BLD AUTO: 47.2 %
MAN DIFF?: NORMAL
MCHC RBC-ENTMCNC: 31.4 PG
MCHC RBC-ENTMCNC: 32.7 GM/DL
MCV RBC AUTO: 95.8 FL
MONOCYTES # BLD AUTO: 0.64 K/UL
MONOCYTES NFR BLD AUTO: 20 %
NEUTROPHILS # BLD AUTO: 0.93 K/UL
NEUTROPHILS NFR BLD AUTO: 29.1 %
PLATELET # BLD AUTO: 200 K/UL
POTASSIUM SERPL-SCNC: 4.6 MMOL/L
PROT SERPL-MCNC: 6.7 G/DL
RBC # BLD: 4.75 M/UL
RBC # FLD: 12.3 %
SODIUM SERPL-SCNC: 142 MMOL/L
WBC # FLD AUTO: 3.2 K/UL

## 2022-07-28 ENCOUNTER — OUTPATIENT (OUTPATIENT)
Dept: OUTPATIENT SERVICES | Facility: HOSPITAL | Age: 62
LOS: 1 days | Discharge: HOME | End: 2022-07-28

## 2022-07-28 DIAGNOSIS — Z98.89 OTHER SPECIFIED POSTPROCEDURAL STATES: Chronic | ICD-10-CM

## 2022-07-28 DIAGNOSIS — G56.01 CARPAL TUNNEL SYNDROME, RIGHT UPPER LIMB: Chronic | ICD-10-CM

## 2022-07-28 DIAGNOSIS — D47.2 MONOCLONAL GAMMOPATHY: ICD-10-CM

## 2022-07-28 DIAGNOSIS — Z98.890 OTHER SPECIFIED POSTPROCEDURAL STATES: Chronic | ICD-10-CM

## 2022-08-08 ENCOUNTER — APPOINTMENT (OUTPATIENT)
Dept: HEMATOLOGY ONCOLOGY | Facility: CLINIC | Age: 62
End: 2022-08-08

## 2022-08-24 ENCOUNTER — APPOINTMENT (OUTPATIENT)
Dept: HEMATOLOGY ONCOLOGY | Facility: CLINIC | Age: 62
End: 2022-08-24

## 2022-08-24 ENCOUNTER — RESULT REVIEW (OUTPATIENT)
Age: 62
End: 2022-08-24

## 2022-08-24 VITALS
WEIGHT: 148.81 LBS | SYSTOLIC BLOOD PRESSURE: 135 MMHG | DIASTOLIC BLOOD PRESSURE: 77 MMHG | TEMPERATURE: 97.7 F | BODY MASS INDEX: 24.02 KG/M2 | OXYGEN SATURATION: 99 % | HEART RATE: 72 BPM | RESPIRATION RATE: 16 BRPM

## 2022-08-24 VITALS — DIASTOLIC BLOOD PRESSURE: 89 MMHG | SYSTOLIC BLOOD PRESSURE: 152 MMHG

## 2022-08-24 DIAGNOSIS — D70.9 NEUTROPENIA, UNSPECIFIED: ICD-10-CM

## 2022-08-24 LAB
ALBUMIN SERPL ELPH-MCNC: 4.4 G/DL
ALP BLD-CCNC: 69 U/L
ALT SERPL-CCNC: 25 U/L
ANION GAP SERPL CALC-SCNC: 15 MMOL/L
AST SERPL-CCNC: 20 U/L
B2 MICROGLOB SERPL-MCNC: 2.4 MG/L
BASOPHILS # BLD AUTO: 0.02 K/UL — SIGNIFICANT CHANGE UP (ref 0–0.2)
BASOPHILS NFR BLD AUTO: 0.5 % — SIGNIFICANT CHANGE UP (ref 0–2)
BILIRUB SERPL-MCNC: 0.6 MG/DL
BUN SERPL-MCNC: 11 MG/DL
CALCIUM SERPL-MCNC: 9.3 MG/DL
CHLORIDE SERPL-SCNC: 106 MMOL/L
CO2 SERPL-SCNC: 24 MMOL/L
CREAT SERPL-MCNC: 0.64 MG/DL
EGFR: 100 ML/MIN/1.73M2
EOSINOPHIL # BLD AUTO: 0.13 K/UL — SIGNIFICANT CHANGE UP (ref 0–0.5)
EOSINOPHIL NFR BLD AUTO: 3.3 % — SIGNIFICANT CHANGE UP (ref 0–6)
GLUCOSE SERPL-MCNC: 97 MG/DL
HCT VFR BLD CALC: 42.4 % — SIGNIFICANT CHANGE UP (ref 34.5–45)
HGB BLD-MCNC: 14.3 G/DL — SIGNIFICANT CHANGE UP (ref 11.5–15.5)
IMM GRANULOCYTES NFR BLD AUTO: 0 % — SIGNIFICANT CHANGE UP (ref 0–1.5)
LDH SERPL-CCNC: 219 U/L
LYMPHOCYTES # BLD AUTO: 1.75 K/UL — SIGNIFICANT CHANGE UP (ref 1–3.3)
LYMPHOCYTES # BLD AUTO: 45 % — HIGH (ref 13–44)
MCHC RBC-ENTMCNC: 31.7 PG — SIGNIFICANT CHANGE UP (ref 27–34)
MCHC RBC-ENTMCNC: 33.7 G/DL — SIGNIFICANT CHANGE UP (ref 32–36)
MCV RBC AUTO: 94 FL — SIGNIFICANT CHANGE UP (ref 80–100)
MONOCYTES # BLD AUTO: 0.46 K/UL — SIGNIFICANT CHANGE UP (ref 0–0.9)
MONOCYTES NFR BLD AUTO: 11.8 % — SIGNIFICANT CHANGE UP (ref 2–14)
NEUTROPHILS # BLD AUTO: 1.53 K/UL — LOW (ref 1.8–7.4)
NEUTROPHILS NFR BLD AUTO: 39.4 % — LOW (ref 43–77)
NRBC # BLD: 0 /100 WBCS — SIGNIFICANT CHANGE UP (ref 0–0)
PLATELET # BLD AUTO: 187 K/UL — SIGNIFICANT CHANGE UP (ref 150–400)
POTASSIUM SERPL-SCNC: 4.2 MMOL/L
PROT SERPL-MCNC: 6 G/DL
RBC # BLD: 4.51 M/UL — SIGNIFICANT CHANGE UP (ref 3.8–5.2)
RBC # FLD: 12.1 % — SIGNIFICANT CHANGE UP (ref 10.3–14.5)
SODIUM SERPL-SCNC: 145 MMOL/L
WBC # BLD: 3.89 K/UL — SIGNIFICANT CHANGE UP (ref 3.8–10.5)
WBC # FLD AUTO: 3.89 K/UL — SIGNIFICANT CHANGE UP (ref 3.8–10.5)

## 2022-08-24 PROCEDURE — 99215 OFFICE O/P EST HI 40 MIN: CPT

## 2022-08-25 NOTE — HISTORY OF PRESENT ILLNESS
[Disease:__________________________] : Disease: [unfilled] [Treatment Protocol] : Treatment Protocol [de-identified] : 62 yo post-menopausal female with a history of Hep C (s/p curative therapy), vasculitis / aortitis (active), migraines and MGUS (dx 2/2021) here to establish care with a new hematologist. She was previously seen by Dr Tobias Bocanegra who diagnosed her with MGUS after performing a myeloma workup. 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 No increase in blasts.\par Flow: Monoclonal lambda plasma cell population that is CD38+ encompassed < 1 % of analyzed WBCs\par Cytogenetics: Normal 46,XX female karyotype\par FISH: Positive for CCND1/IGH t(11;14) -- Negative for RB1 deletion, TP53 (17p13) deletion, duplication 1q21, FGFR3/IGH t(4;14) and IGH/MAF t(14;16)\par \par Imaging:\par Skeletal Survey 2/8/21: No lytic or blastic osseous lesions\par CT Chest (w/o contrast) 2/17/21: 3 mm subpleural nodule thought to be postinflammatory or benign lymph node. No destructive lesions or other significant findings.\par \par PET-CT 1/19/22: \par 1. FDG avidity along the walls of the thoracic and abdominal aorta, with activity extending into the right brachiocephalic and bilateral subclavian arteries, and bilateral iliac and femoral vessels. This is concerning for vasculitis. \par 2. Cystic lesions in bilateral breasts with associated FDG avid soft tissue in the anterior posterior aspects.\par 3. No abnormal FDG activity in bones\par \par Social Hx:\par , lives in [place], works as an , born in [place]\par Current Smoker \par No significant Alcohol use\par \par Allergies:\par NKDA\par \par ========================================================\par Care Providers\par \par Prior Hematologist / Oncologist: Dr Tobias Damon ([facility])\par Rheumatologist: Dr Сергей Patino\par PMD: Dr. Rolly Beard: [phone] [de-identified] : N/A [de-identified] : See above [de-identified] : Positive for t(11;14) which is favorable in MGUG/Myeloma [FreeTextEntry1] : Observation [de-identified] : 2/9/22: Initial Visit. Ms Rigoberto presents today to establish care with a new hematology team connected to [facility]. Today she complains of severe neck, right ankle, leg/back pain limiting her ability to work and stand for long periods of time. She has had a spinal workup in the past with multiple herniated discs, and also has had steroid injections without significant improvement as of recent (last injection on Alhaji 3, 2022). She has had severe pain in her right ankle for many weeks now and she feels it may radiate from her back or leg. She denied any numbness. This has significantly affected her ability to function both at home and at work and descending altitude on the plane makes the pain unbearable. Recent PET-CT showed recurrent aortitis in Jan 2022. She denies any recent fevers, chills, night sweats, significant weight loss. She also reports that she was recently told that she has smoldering myeloma, which at this time does not appear to be true.\par \par 5/11/22: Follow- up. She is getting treated for her vasculitis with Dr Сергей Patino with no recent flare ups. She is now off steroids. She still has been in severe pain in her right ankle (uses a brace now), leg/back pain. Cataract surgery in left eye planned on 5/20/22. She is getting a mammogram and ultrasound on 5/31/22. Denies swelling in ankles even when flying. She bruises easily. Recently had a left tooth abscess that caused facial swelling 3 weeks ago.  She denies any recent fevers, chills, night sweats, significant weight loss. No new lumps or bumps. \par \par 8/24/22: Follow-up. Her PCP is concerned about her HTN. Her BP is slightly elevated today in 150/80s range. We checked her home BP device and it is higher than our measurements, she was advised to check also when she sees her PCP. She has been on metoprolol 50 mg for 2 months and losartan 50 mg for last one month with some very mild improvement. She is still working as a  for [employer], her pain is much better controlled and she is able to work now without limitations. Overall she has had a significant improvement since starting Tocilizumab and trigger point injections in her back and right ankle. Compression stockings also help. A few months ago and was hospitalized for abscess, received abx, with subsequent yeast infection, however he has otherwise not experienced any other recent infections.. Patient still smokes cigarettes. No new noticeable masses No trouble swallowing,issues with bowel or urinary function, changes in appetite. \par \par ___________\par A comprehensive review of systems was performed including constitutional, eyes, ENT, cardiovascular, respiratory, gastrointestinal, genitourinary, musculoskeletal, integumentary, neurological, psychiatric and hematologic / lymphatic. All pertinent positives are included in the H&P under interval history above and the remaining review of systems listed are negative.

## 2022-08-25 NOTE — ASSESSMENT
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 \par \par She has no CRAB signs or symptoms and her most recent PET-CT imaging was in Jan 2022. Her disease consists mostly of elevated lambda light chain with an involved vs uninvolved ratio that remains < 100, which has improved slightly since her last visit. She does have a weak M-spike that is not quantifiable on recent blood work. Her last BM biopsy in 2/2021 showed 10% monotypic plasma cells which puts her on the border of MGUS/Smoldering Myeloma, both of which should be monitored and not treated with myeloma-directed therapies (pathology not verified here as of yet). At this time she does not meet revised IMWG criteria for multiple myeloma and remains on observation. \par \par She underwent a left posterior maxillary hard tissue biopsy on 4/6/22 that showed osteitis. She was treated for an oral abscess. She has not experienced any other infectious complaints since. She remains on Tocilizumab for vasculitis and continues to tolerate well. She has periods of mild neutropenia, possibly related to Tocilizumab (not typical in MGUS / smoldering myeloma), would continue to monitor, no action needed at this time.\par \par \par Plan:\par - Repeat basic MGUS / smoldering myeloma labs with serum immunoelectrophoresis\par - Monitor serum immunoelectrophoresis every 3 months as long as stable\par - Hold off additional BM biopsies at this time, can repeat if significant changes in immunoelectrophoresis studies or CBC\par - Rheum Dr Сергей Patino for management of vasculitis, currently on Tocilizumab. She had experienced mild neutropenia which is improving, possible related to toci, however she is not in a dangerous range or experiencing any atypical infections, so would continue Toci and continue CBC monitoring\par - HTN: 150/89 today, follow-up PMD for titration of losartan and metoprolol, may need a 3rd agent\par - Current smoker: Has tried multiple smoking cessation aids without success, discussed cessation options again today\par - Next visit in 3 months\par \par _____\par I personally have spent a total of 40 minutes of time on the date of this encounter reviewing test results, documenting findings, providing education, coordinating care and directly consulting with the patient and/or designated family member.

## 2022-08-30 ENCOUNTER — APPOINTMENT (OUTPATIENT)
Dept: RHEUMATOLOGY | Facility: CLINIC | Age: 62
End: 2022-08-30

## 2022-08-30 VITALS
HEART RATE: 74 BPM | HEIGHT: 66 IN | DIASTOLIC BLOOD PRESSURE: 82 MMHG | SYSTOLIC BLOOD PRESSURE: 127 MMHG | TEMPERATURE: 97.5 F | WEIGHT: 150 LBS | BODY MASS INDEX: 24.11 KG/M2 | OXYGEN SATURATION: 98 %

## 2022-08-30 PROCEDURE — 99214 OFFICE O/P EST MOD 30 MIN: CPT | Mod: GC

## 2022-09-02 LAB
ALBUMIN MFR SERPL ELPH: 71.6 %
ALBUMIN SERPL-MCNC: 4.4 G/DL
ALBUMIN/GLOB SERPL: 2.6 RATIO
ALPHA1 GLOB MFR SERPL ELPH: 3.2 %
ALPHA1 GLOB SERPL ELPH-MCNC: 0.2 G/DL
ALPHA2 GLOB MFR SERPL ELPH: 10.7 %
ALPHA2 GLOB SERPL ELPH-MCNC: 0.7 G/DL
B-GLOBULIN MFR SERPL ELPH: 8.4 %
B-GLOBULIN SERPL ELPH-MCNC: 0.5 G/DL
DEPRECATED KAPPA LC FREE/LAMBDA SER: 0.1 RATIO
GAMMA GLOB FLD ELPH-MCNC: 0.4 G/DL
GAMMA GLOB MFR SERPL ELPH: 6.1 %
IGA SER QL IEP: 45 MG/DL
IGG SER QL IEP: 432 MG/DL
IGM SER QL IEP: 17 MG/DL
INTERPRETATION SERPL IEP-IMP: NORMAL
KAPPA LC CSF-MCNC: 8.51 MG/DL
KAPPA LC SERPL-MCNC: 0.83 MG/DL
M PROTEIN SPEC IFE-MCNC: NORMAL
PROT SERPL-MCNC: 6.1 G/DL
PROT SERPL-MCNC: 6.1 G/DL

## 2022-09-07 LAB
ALBUMIN SERPL ELPH-MCNC: 4.8 G/DL
ALP BLD-CCNC: 66 U/L
ALT SERPL-CCNC: 25 U/L
ANION GAP SERPL CALC-SCNC: 11 MMOL/L
AST SERPL-CCNC: 21 U/L
BASOPHILS # BLD AUTO: 0.05 K/UL
BASOPHILS NFR BLD AUTO: 1.1 %
BILIRUB SERPL-MCNC: 0.6 MG/DL
BUN SERPL-MCNC: 10 MG/DL
CALCIUM SERPL-MCNC: 9.6 MG/DL
CHLORIDE SERPL-SCNC: 109 MMOL/L
CO2 SERPL-SCNC: 26 MMOL/L
CREAT SERPL-MCNC: 0.65 MG/DL
CRP SERPL-MCNC: <3 MG/L
EGFR: 99 ML/MIN/1.73M2
EOSINOPHIL # BLD AUTO: 0.12 K/UL
EOSINOPHIL NFR BLD AUTO: 2.5 %
ERYTHROCYTE [SEDIMENTATION RATE] IN BLOOD BY WESTERGREN METHOD: < 2 MM/HR
GLUCOSE SERPL-MCNC: 100 MG/DL
HCT VFR BLD CALC: 41.4 %
HGB BLD-MCNC: 13.9 G/DL
IMM GRANULOCYTES NFR BLD AUTO: 0.2 %
LYMPHOCYTES # BLD AUTO: 2.02 K/UL
LYMPHOCYTES NFR BLD AUTO: 42.7 %
MAN DIFF?: NORMAL
MCHC RBC-ENTMCNC: 31.7 PG
MCHC RBC-ENTMCNC: 33.6 GM/DL
MCV RBC AUTO: 94.3 FL
MONOCYTES # BLD AUTO: 0.58 K/UL
MONOCYTES NFR BLD AUTO: 12.3 %
NEUTROPHILS # BLD AUTO: 1.95 K/UL
NEUTROPHILS NFR BLD AUTO: 41.2 %
PLATELET # BLD AUTO: 193 K/UL
POTASSIUM SERPL-SCNC: 4.7 MMOL/L
PROT SERPL-MCNC: 6.3 G/DL
RBC # BLD: 4.39 M/UL
RBC # FLD: 12.4 %
SODIUM SERPL-SCNC: 147 MMOL/L
WBC # FLD AUTO: 4.73 K/UL

## 2022-09-12 ENCOUNTER — TRANSCRIPTION ENCOUNTER (OUTPATIENT)
Age: 62
End: 2022-09-12

## 2022-10-03 ENCOUNTER — APPOINTMENT (OUTPATIENT)
Dept: RADIOLOGY | Facility: CLINIC | Age: 62
End: 2022-10-03

## 2022-10-03 ENCOUNTER — OUTPATIENT (OUTPATIENT)
Dept: OUTPATIENT SERVICES | Facility: HOSPITAL | Age: 62
LOS: 1 days | End: 2022-10-03
Payer: COMMERCIAL

## 2022-10-03 ENCOUNTER — APPOINTMENT (OUTPATIENT)
Dept: RHEUMATOLOGY | Facility: CLINIC | Age: 62
End: 2022-10-03

## 2022-10-03 VITALS
DIASTOLIC BLOOD PRESSURE: 74 MMHG | RESPIRATION RATE: 16 BRPM | WEIGHT: 148 LBS | BODY MASS INDEX: 23.78 KG/M2 | OXYGEN SATURATION: 97 % | HEIGHT: 66 IN | SYSTOLIC BLOOD PRESSURE: 144 MMHG | HEART RATE: 77 BPM | TEMPERATURE: 97.2 F

## 2022-10-03 DIAGNOSIS — M19.079 PRIMARY OSTEOARTHRITIS, UNSPECIFIED ANKLE AND FOOT: ICD-10-CM

## 2022-10-03 DIAGNOSIS — J40 BRONCHITIS, NOT SPECIFIED AS ACUTE OR CHRONIC: ICD-10-CM

## 2022-10-03 DIAGNOSIS — Z98.89 OTHER SPECIFIED POSTPROCEDURAL STATES: Chronic | ICD-10-CM

## 2022-10-03 DIAGNOSIS — Z98.890 OTHER SPECIFIED POSTPROCEDURAL STATES: Chronic | ICD-10-CM

## 2022-10-03 DIAGNOSIS — G56.01 CARPAL TUNNEL SYNDROME, RIGHT UPPER LIMB: Chronic | ICD-10-CM

## 2022-10-03 PROCEDURE — 71046 X-RAY EXAM CHEST 2 VIEWS: CPT | Mod: 26

## 2022-10-03 PROCEDURE — 73600 X-RAY EXAM OF ANKLE: CPT

## 2022-10-03 PROCEDURE — 71046 X-RAY EXAM CHEST 2 VIEWS: CPT

## 2022-10-03 PROCEDURE — 73600 X-RAY EXAM OF ANKLE: CPT | Mod: 26,RT

## 2022-10-03 PROCEDURE — 99214 OFFICE O/P EST MOD 30 MIN: CPT

## 2022-10-06 ENCOUNTER — TRANSCRIPTION ENCOUNTER (OUTPATIENT)
Age: 62
End: 2022-10-06

## 2022-10-06 LAB
ALBUMIN SERPL ELPH-MCNC: 4.9 G/DL
ALP BLD-CCNC: 77 U/L
ALT SERPL-CCNC: 46 U/L
ANION GAP SERPL CALC-SCNC: 12 MMOL/L
AST SERPL-CCNC: 18 U/L
BASOPHILS # BLD AUTO: 0.04 K/UL
BASOPHILS NFR BLD AUTO: 0.9 %
BILIRUB SERPL-MCNC: 0.7 MG/DL
BUN SERPL-MCNC: 15 MG/DL
CALCIUM SERPL-MCNC: 9.8 MG/DL
CHLORIDE SERPL-SCNC: 106 MMOL/L
CO2 SERPL-SCNC: 25 MMOL/L
CREAT SERPL-MCNC: 0.89 MG/DL
CRP SERPL-MCNC: <3 MG/L
EGFR: 73 ML/MIN/1.73M2
EOSINOPHIL # BLD AUTO: 0.14 K/UL
EOSINOPHIL NFR BLD AUTO: 3.2 %
ERYTHROCYTE [SEDIMENTATION RATE] IN BLOOD BY WESTERGREN METHOD: 2 MM/HR
GLUCOSE SERPL-MCNC: 94 MG/DL
HCT VFR BLD CALC: 43.6 %
HGB BLD-MCNC: 14.6 G/DL
IMM GRANULOCYTES NFR BLD AUTO: 0.2 %
LYMPHOCYTES # BLD AUTO: 1.51 K/UL
LYMPHOCYTES NFR BLD AUTO: 34.4 %
MAN DIFF?: NORMAL
MCHC RBC-ENTMCNC: 32 PG
MCHC RBC-ENTMCNC: 33.5 GM/DL
MCV RBC AUTO: 95.6 FL
MONOCYTES # BLD AUTO: 0.53 K/UL
MONOCYTES NFR BLD AUTO: 12.1 %
NEUTROPHILS # BLD AUTO: 2.16 K/UL
NEUTROPHILS NFR BLD AUTO: 49.2 %
PLATELET # BLD AUTO: 225 K/UL
POTASSIUM SERPL-SCNC: 5.2 MMOL/L
PROT SERPL-MCNC: 6.3 G/DL
RBC # BLD: 4.56 M/UL
RBC # FLD: 12.7 %
SODIUM SERPL-SCNC: 142 MMOL/L
WBC # FLD AUTO: 4.39 K/UL

## 2022-10-07 ENCOUNTER — TRANSCRIPTION ENCOUNTER (OUTPATIENT)
Age: 62
End: 2022-10-07

## 2022-11-17 ENCOUNTER — APPOINTMENT (OUTPATIENT)
Dept: RHEUMATOLOGY | Facility: CLINIC | Age: 62
End: 2022-11-17

## 2022-11-17 ENCOUNTER — LABORATORY RESULT (OUTPATIENT)
Age: 62
End: 2022-11-17

## 2022-11-17 VITALS
RESPIRATION RATE: 16 BRPM | HEART RATE: 86 BPM | HEIGHT: 66 IN | WEIGHT: 150 LBS | TEMPERATURE: 96.9 F | BODY MASS INDEX: 24.11 KG/M2 | SYSTOLIC BLOOD PRESSURE: 169 MMHG | DIASTOLIC BLOOD PRESSURE: 93 MMHG | OXYGEN SATURATION: 98 %

## 2022-11-17 PROCEDURE — 90686 IIV4 VACC NO PRSV 0.5 ML IM: CPT

## 2022-11-17 PROCEDURE — G0008: CPT

## 2022-11-17 PROCEDURE — 99214 OFFICE O/P EST MOD 30 MIN: CPT | Mod: 25

## 2022-11-17 NOTE — PHYSICAL EXAM
[General Appearance - Alert] : alert [General Appearance - In No Acute Distress] : in no acute distress [Sclera] : the sclera and conjunctiva were normal [Auscultation Breath Sounds / Voice Sounds] : lungs were clear to auscultation bilaterally [Heart Sounds] : normal S1 and S2 [Musculoskeletal - Swelling] : no joint swelling seen [Impaired Insight] : insight and judgment were intact

## 2022-11-17 NOTE — HISTORY OF PRESENT ILLNESS
[de-identified] : At today's visit [FreeTextEntry1] : She feels well overall, no episodes of chest pain/pressure\par Experience mild shortness of breath with exertion specially using the stairs\par No rashes, no joint pain or joint swelling....\par No fevers or chills, no weight loss....\par Continues to have difficult to control blood pressure, gets reading at home in the range of 1 6, 170\par Although the blood pressure at the cardiologist office 2 weeks ago was 120\par \par

## 2022-11-17 NOTE — CONSULT LETTER
[Dear  ___] : Dear  [unfilled], [Please see my note below.] : Please see my note below. [Sincerely,] : Sincerely, [FreeTextEntry3] : Neva Patino MD\par , [facility] at [facility]\par Division of Rheumatology\par  [DrJyoti  ___] : Dr. ESTEVEZ

## 2022-11-17 NOTE — ASSESSMENT
[FreeTextEntry1] : \par 1- Large Vessel Vasculitis \par Aortitis with wall thickening of TA Ao and bilateral LORI seen on CT A/P since 2/2021 \par IgG4 levels normal ,ALDAIR, ANCA and all serology negative, elevated ESR, CRP (on steroids)\par follow-up CTA of neck and chest were normal, MRA chest 8/20/2021 - no evidence of aortitis \par CTA chest 9/8/21, discussed with radiologist - Increased thickness in aortic wall compared to 5/21 imaging\par *PET CT 1/19/22: FDG avidity along the walls of the thoracic and abdominal aorta with activity extending into the right BC, bilateral SC arteries, bilateral iliac and femoral vessels.\par **Started TCZ on 2/25/22 \par \par 2- Osteopenia (DEXA August 2021)\par 3- Vaccination status/immunosuppressed status: \par COVID vaccine completed , booster :11/30/21 - second booster received 5/4/22\par PCV 13 -received, flu vaccine -due \par PPSV 23 administered 2/10/22\par Shingrix-first dose 2/25-**Second dose administered 6/18/22\par 4- MGUS, following with hematologist \par 5-Dental abscess with osteomyelitis- resolved, cleared by dental \par Actemra resumed 4/24/22\par 6-Right ankle injury/ arthritis \par 7- High BP, uncontrolled\par Apparently had a negative work up for secondary causes with cardiology\par ? Actemra contributing \par \par \par \par [] Obtain monitoring blood work today for monitoring \par [] check disease activity markers today\par [] continue with Actemra weekly (every Sunday) for now\par [] Follow-up with cardiology regarding better blood pressure control\par [] Repeat PET to assess response to tocilizumab\par If improvement/stabilization would consider switching to an every other week dosage\par [] To schedule COVID booster\par [] flu vaccine administered today\par \par RTO in 4-6 weeks\par \par

## 2022-11-21 ENCOUNTER — TRANSCRIPTION ENCOUNTER (OUTPATIENT)
Age: 62
End: 2022-11-21

## 2022-11-21 LAB
ALBUMIN SERPL ELPH-MCNC: 5.1 G/DL
ALP BLD-CCNC: 73 U/L
ALT SERPL-CCNC: 34 U/L
ANION GAP SERPL CALC-SCNC: 12 MMOL/L
AST SERPL-CCNC: 19 U/L
BASOPHILS # BLD AUTO: 0.02 K/UL
BASOPHILS NFR BLD AUTO: 0.2 %
BILIRUB SERPL-MCNC: 0.4 MG/DL
BUN SERPL-MCNC: 22 MG/DL
CALCIUM SERPL-MCNC: 9.7 MG/DL
CHLORIDE SERPL-SCNC: 107 MMOL/L
CO2 SERPL-SCNC: 26 MMOL/L
CREAT SERPL-MCNC: 0.58 MG/DL
CRP SERPL-MCNC: <3 MG/L
EGFR: 102 ML/MIN/1.73M2
EOSINOPHIL # BLD AUTO: 0.03 K/UL
EOSINOPHIL NFR BLD AUTO: 0.4 %
ERYTHROCYTE [SEDIMENTATION RATE] IN BLOOD BY WESTERGREN METHOD: 2 MM/HR
GLUCOSE SERPL-MCNC: 100 MG/DL
HBV CORE IGG+IGM SER QL: NONREACTIVE
HBV SURFACE AG SER QL: NONREACTIVE
HCT VFR BLD CALC: 41 %
HCV AB SER QL: REACTIVE
HCV S/CO RATIO: 14.26 S/CO
HGB BLD-MCNC: 13.6 G/DL
IMM GRANULOCYTES NFR BLD AUTO: 0.4 %
LYMPHOCYTES # BLD AUTO: 1.98 K/UL
LYMPHOCYTES NFR BLD AUTO: 23.8 %
MAN DIFF?: NORMAL
MCHC RBC-ENTMCNC: 31.1 PG
MCHC RBC-ENTMCNC: 33.2 GM/DL
MCV RBC AUTO: 93.8 FL
MONOCYTES # BLD AUTO: 0.8 K/UL
MONOCYTES NFR BLD AUTO: 9.6 %
NEUTROPHILS # BLD AUTO: 5.46 K/UL
NEUTROPHILS NFR BLD AUTO: 65.6 %
PLATELET # BLD AUTO: 230 K/UL
POTASSIUM SERPL-SCNC: 4.5 MMOL/L
PROT SERPL-MCNC: 6.7 G/DL
RBC # BLD: 4.37 M/UL
RBC # FLD: 12.1 %
SODIUM SERPL-SCNC: 146 MMOL/L
WBC # FLD AUTO: 8.32 K/UL

## 2022-11-22 ENCOUNTER — TRANSCRIPTION ENCOUNTER (OUTPATIENT)
Age: 62
End: 2022-11-22

## 2022-11-29 ENCOUNTER — APPOINTMENT (OUTPATIENT)
Dept: NUCLEAR MEDICINE | Facility: IMAGING CENTER | Age: 62
End: 2022-11-29

## 2022-11-29 ENCOUNTER — OUTPATIENT (OUTPATIENT)
Dept: OUTPATIENT SERVICES | Facility: HOSPITAL | Age: 62
LOS: 1 days | End: 2022-11-29
Payer: COMMERCIAL

## 2022-11-29 DIAGNOSIS — Z98.890 OTHER SPECIFIED POSTPROCEDURAL STATES: Chronic | ICD-10-CM

## 2022-11-29 DIAGNOSIS — M31.6 OTHER GIANT CELL ARTERITIS: ICD-10-CM

## 2022-11-29 DIAGNOSIS — Z98.89 OTHER SPECIFIED POSTPROCEDURAL STATES: Chronic | ICD-10-CM

## 2022-11-29 DIAGNOSIS — D47.2 MONOCLONAL GAMMOPATHY: ICD-10-CM

## 2022-11-29 DIAGNOSIS — G56.01 CARPAL TUNNEL SYNDROME, RIGHT UPPER LIMB: Chronic | ICD-10-CM

## 2022-11-29 PROCEDURE — 78816 PET IMAGE W/CT FULL BODY: CPT

## 2022-11-29 PROCEDURE — 78816 PET IMAGE W/CT FULL BODY: CPT | Mod: 26

## 2022-11-29 PROCEDURE — A9552: CPT

## 2022-12-01 ENCOUNTER — APPOINTMENT (OUTPATIENT)
Dept: MRI IMAGING | Facility: CLINIC | Age: 62
End: 2022-12-01

## 2022-12-01 ENCOUNTER — RESULT REVIEW (OUTPATIENT)
Age: 62
End: 2022-12-01

## 2022-12-01 ENCOUNTER — TRANSCRIPTION ENCOUNTER (OUTPATIENT)
Age: 62
End: 2022-12-01

## 2022-12-01 ENCOUNTER — OUTPATIENT (OUTPATIENT)
Dept: OUTPATIENT SERVICES | Facility: HOSPITAL | Age: 62
LOS: 1 days | End: 2022-12-01
Payer: COMMERCIAL

## 2022-12-01 DIAGNOSIS — Z98.890 OTHER SPECIFIED POSTPROCEDURAL STATES: Chronic | ICD-10-CM

## 2022-12-01 DIAGNOSIS — R94.31 ABNORMAL ELECTROCARDIOGRAM [ECG] [EKG]: ICD-10-CM

## 2022-12-01 DIAGNOSIS — Z98.89 OTHER SPECIFIED POSTPROCEDURAL STATES: Chronic | ICD-10-CM

## 2022-12-01 DIAGNOSIS — G56.01 CARPAL TUNNEL SYNDROME, RIGHT UPPER LIMB: Chronic | ICD-10-CM

## 2022-12-01 DIAGNOSIS — I42.9 CARDIOMYOPATHY, UNSPECIFIED: ICD-10-CM

## 2022-12-01 DIAGNOSIS — Z00.8 ENCOUNTER FOR OTHER GENERAL EXAMINATION: ICD-10-CM

## 2022-12-01 PROCEDURE — 75561 CARDIAC MRI FOR MORPH W/DYE: CPT

## 2022-12-01 PROCEDURE — 75561 CARDIAC MRI FOR MORPH W/DYE: CPT | Mod: 26

## 2022-12-01 PROCEDURE — A9585: CPT

## 2022-12-02 ENCOUNTER — TRANSCRIPTION ENCOUNTER (OUTPATIENT)
Age: 62
End: 2022-12-02

## 2022-12-08 ENCOUNTER — TRANSCRIPTION ENCOUNTER (OUTPATIENT)
Age: 62
End: 2022-12-08

## 2022-12-09 ENCOUNTER — TRANSCRIPTION ENCOUNTER (OUTPATIENT)
Age: 62
End: 2022-12-09

## 2022-12-15 ENCOUNTER — TRANSCRIPTION ENCOUNTER (OUTPATIENT)
Age: 62
End: 2022-12-15

## 2022-12-19 ENCOUNTER — APPOINTMENT (OUTPATIENT)
Dept: RHEUMATOLOGY | Facility: CLINIC | Age: 62
End: 2022-12-19

## 2022-12-19 ENCOUNTER — TRANSCRIPTION ENCOUNTER (OUTPATIENT)
Age: 62
End: 2022-12-19

## 2022-12-21 ENCOUNTER — NON-APPOINTMENT (OUTPATIENT)
Age: 62
End: 2022-12-21

## 2022-12-21 ENCOUNTER — TRANSCRIPTION ENCOUNTER (OUTPATIENT)
Age: 62
End: 2022-12-21

## 2022-12-23 ENCOUNTER — TRANSCRIPTION ENCOUNTER (OUTPATIENT)
Age: 62
End: 2022-12-23

## 2022-12-30 ENCOUNTER — APPOINTMENT (OUTPATIENT)
Dept: RHEUMATOLOGY | Facility: CLINIC | Age: 62
End: 2022-12-30
Payer: COMMERCIAL

## 2022-12-30 VITALS
HEIGHT: 66 IN | OXYGEN SATURATION: 99 % | TEMPERATURE: 97.2 F | WEIGHT: 150 LBS | BODY MASS INDEX: 24.11 KG/M2 | DIASTOLIC BLOOD PRESSURE: 85 MMHG | SYSTOLIC BLOOD PRESSURE: 153 MMHG | HEART RATE: 88 BPM

## 2022-12-30 PROCEDURE — 99214 OFFICE O/P EST MOD 30 MIN: CPT

## 2022-12-30 NOTE — HISTORY OF PRESENT ILLNESS
[de-identified] : At today's visit, [FreeTextEntry1] : She has been feeling well overall no recurrent episodes of chest pain or chest pressure\par Continues to experience exertional dyspnea\par

## 2022-12-30 NOTE — ASSESSMENT
[FreeTextEntry1] : \par 1- Large Vessel Vasculitis \par Aortitis with wall thickening of TA Ao and bilateral LORI seen on CT A/P since 2/2021 \par IgG4 levels normal ,ALDAIR, ANCA and all serology negative, elevated ESR, CRP (on steroids)\par follow-up CTA of neck and chest were normal, MRA chest 8/20/2021 - no evidence of aortitis \par CTA chest 9/8/21, discussed with radiologist - Increased thickness in aortic wall compared to 5/21 imaging\par *PET CT 1/19/22: FDG avidity along the walls of the thoracic and abdominal aorta with activity extending into the right BC, bilateral SC arteries, bilateral iliac and femoral vessels.\par **Started TCZ on 2/25/22 \par -Recent PET CT reviewed by me personally and with radiologist, almost resolution of FDG activity in the walls\par Of the thoracic and abdominal aorta and its branches suggesting response to therapy\par \par \par 2-High BP, uncontrolled\par Unclear if Actemra contributing, given response to therapy would consider changing to every 2-week dosing interval\par Obtain monitoring blood work today\par \par 3-possible cardiomyopathy\par Recent cardiac MRI reviewed today: Differential diagnostic consideration include apical hypertrophic cardiomyopathy or cardiac sarcoidosis\par I discussed findings with radiologist, would recommend a dedicated cardiac PET/CT\par After conversation with patient's cardiologist Dr. Best, he prefers to hold off until evaluation by cardiologist Dr. Esquivel\par \par 4- Osteopenia (DEXA August 2021)\par 5- Vaccination status/immunosuppressed status: \par COVID vaccine completed , booster :11/30/21 - second booster received 5/4/22, third booster 11/20/22\par PCV 13 -received, flu vaccine -due \par PPSV 23 administered 2/10/22\par Shingrix-first dose 2/25-**Second dose administered 6/18/22\par 6- MGUS, following with hematologist \par \par \par RTO in 2 months

## 2022-12-30 NOTE — PHYSICAL EXAM
[General Appearance - Alert] : alert [General Appearance - In No Acute Distress] : in no acute distress [Respiration, Rhythm And Depth] : normal respiratory rhythm and effort [Impaired Insight] : insight and judgment were intact

## 2022-12-30 NOTE — CONSULT LETTER
[Dear  ___] : Dear  [unfilled], [Please see my note below.] : Please see my note below. [Sincerely,] : Sincerely, [FreeTextEntry3] : Neva Patino MD\par , [facility] at [facility]\par Division of Rheumatology\par  [DrJyoti  ___] : Dr. ESTEVEZ

## 2023-01-02 PROBLEM — M19.079 ANKLE ARTHRITIS: Status: ACTIVE | Noted: 2021-08-27

## 2023-01-02 NOTE — PHYSICAL EXAM
[de-identified] : MRI from 9/1/2021 shows a "stress reaction" in the distal fibula and chondral fissuring over the talus.  There are some subchondral cystic changes in the distal fibula as well.

## 2023-01-02 NOTE — HISTORY OF PRESENT ILLNESS
[de-identified] : 62 yo female is here for an evaluation of her right ankle. She injured her ankle 11 yrs ago while shoveling snow. Since September 2021 she has been experiencing pain on and off. She is a , and she states when the plane decents she feels pain and discomfort, she can't walk. Epidural shot on 1/3/22 by Dr. Marquez to help with pain.

## 2023-01-02 NOTE — DISCUSSION/SUMMARY
[de-identified] : \par -Follow-up in 2 weeks\par -Cam boot\par -No Xrays were taken today 2/10/22. \par -Mobic 7.5

## 2023-01-04 ENCOUNTER — TRANSCRIPTION ENCOUNTER (OUTPATIENT)
Age: 63
End: 2023-01-04

## 2023-01-05 ENCOUNTER — TRANSCRIPTION ENCOUNTER (OUTPATIENT)
Age: 63
End: 2023-01-05

## 2023-01-05 ENCOUNTER — NON-APPOINTMENT (OUTPATIENT)
Age: 63
End: 2023-01-05

## 2023-01-05 DIAGNOSIS — I51.7 CARDIOMEGALY: ICD-10-CM

## 2023-01-05 LAB
ALBUMIN SERPL ELPH-MCNC: 4.7 G/DL
ALP BLD-CCNC: 69 U/L
ALT SERPL-CCNC: 30 U/L
ANION GAP SERPL CALC-SCNC: 12 MMOL/L
AST SERPL-CCNC: 19 U/L
BASOPHILS # BLD AUTO: 0.04 K/UL
BASOPHILS NFR BLD AUTO: 0.8 %
BILIRUB SERPL-MCNC: 0.4 MG/DL
BUN SERPL-MCNC: 19 MG/DL
CALCIUM SERPL-MCNC: 9.2 MG/DL
CHLORIDE SERPL-SCNC: 111 MMOL/L
CO2 SERPL-SCNC: 24 MMOL/L
CREAT SERPL-MCNC: 0.7 MG/DL
CRP SERPL-MCNC: <3 MG/L
EGFR: 98 ML/MIN/1.73M2
EOSINOPHIL # BLD AUTO: 0.08 K/UL
EOSINOPHIL NFR BLD AUTO: 1.6 %
ERYTHROCYTE [SEDIMENTATION RATE] IN BLOOD BY WESTERGREN METHOD: 2 MM/HR
GLUCOSE SERPL-MCNC: 109 MG/DL
HCT VFR BLD CALC: 43.1 %
HGB BLD-MCNC: 14.1 G/DL
IMM GRANULOCYTES NFR BLD AUTO: 0.2 %
LYMPHOCYTES # BLD AUTO: 1.56 K/UL
LYMPHOCYTES NFR BLD AUTO: 30.4 %
MAN DIFF?: NORMAL
MCHC RBC-ENTMCNC: 31.8 PG
MCHC RBC-ENTMCNC: 32.7 GM/DL
MCV RBC AUTO: 97.3 FL
MONOCYTES # BLD AUTO: 0.42 K/UL
MONOCYTES NFR BLD AUTO: 8.2 %
NEUTROPHILS # BLD AUTO: 3.02 K/UL
NEUTROPHILS NFR BLD AUTO: 58.8 %
PLATELET # BLD AUTO: 212 K/UL
POTASSIUM SERPL-SCNC: 4.5 MMOL/L
PROT SERPL-MCNC: 6.5 G/DL
RBC # BLD: 4.43 M/UL
RBC # FLD: 12.7 %
SODIUM SERPL-SCNC: 147 MMOL/L
WBC # FLD AUTO: 5.13 K/UL

## 2023-01-12 ENCOUNTER — OUTPATIENT (OUTPATIENT)
Dept: OUTPATIENT SERVICES | Facility: HOSPITAL | Age: 63
LOS: 1 days | Discharge: HOME | End: 2023-01-12

## 2023-01-12 DIAGNOSIS — G56.01 CARPAL TUNNEL SYNDROME, RIGHT UPPER LIMB: Chronic | ICD-10-CM

## 2023-01-12 DIAGNOSIS — Z98.89 OTHER SPECIFIED POSTPROCEDURAL STATES: Chronic | ICD-10-CM

## 2023-01-12 DIAGNOSIS — Z98.890 OTHER SPECIFIED POSTPROCEDURAL STATES: Chronic | ICD-10-CM

## 2023-01-12 DIAGNOSIS — D47.2 MONOCLONAL GAMMOPATHY: ICD-10-CM

## 2023-01-18 ENCOUNTER — RESULT REVIEW (OUTPATIENT)
Age: 63
End: 2023-01-18

## 2023-01-18 ENCOUNTER — APPOINTMENT (OUTPATIENT)
Dept: HEMATOLOGY ONCOLOGY | Facility: CLINIC | Age: 63
End: 2023-01-18
Payer: COMMERCIAL

## 2023-01-18 VITALS
TEMPERATURE: 97 F | DIASTOLIC BLOOD PRESSURE: 82 MMHG | BODY MASS INDEX: 24.55 KG/M2 | OXYGEN SATURATION: 99 % | SYSTOLIC BLOOD PRESSURE: 131 MMHG | HEART RATE: 65 BPM | WEIGHT: 152.12 LBS | RESPIRATION RATE: 16 BRPM

## 2023-01-18 LAB
BASOPHILS # BLD AUTO: 0.03 K/UL — SIGNIFICANT CHANGE UP (ref 0–0.2)
BASOPHILS NFR BLD AUTO: 0.8 % — SIGNIFICANT CHANGE UP (ref 0–2)
EOSINOPHIL # BLD AUTO: 0.05 K/UL — SIGNIFICANT CHANGE UP (ref 0–0.5)
EOSINOPHIL NFR BLD AUTO: 1.3 % — SIGNIFICANT CHANGE UP (ref 0–6)
HCT VFR BLD CALC: 39.1 % — SIGNIFICANT CHANGE UP (ref 34.5–45)
HGB BLD-MCNC: 13.4 G/DL — SIGNIFICANT CHANGE UP (ref 11.5–15.5)
IMM GRANULOCYTES NFR BLD AUTO: 0 % — SIGNIFICANT CHANGE UP (ref 0–0.9)
LYMPHOCYTES # BLD AUTO: 1.32 K/UL — SIGNIFICANT CHANGE UP (ref 1–3.3)
LYMPHOCYTES # BLD AUTO: 33.5 % — SIGNIFICANT CHANGE UP (ref 13–44)
MCHC RBC-ENTMCNC: 31.3 PG — SIGNIFICANT CHANGE UP (ref 27–34)
MCHC RBC-ENTMCNC: 34.3 G/DL — SIGNIFICANT CHANGE UP (ref 32–36)
MCV RBC AUTO: 91.4 FL — SIGNIFICANT CHANGE UP (ref 80–100)
MONOCYTES # BLD AUTO: 0.4 K/UL — SIGNIFICANT CHANGE UP (ref 0–0.9)
MONOCYTES NFR BLD AUTO: 10.2 % — SIGNIFICANT CHANGE UP (ref 2–14)
NEUTROPHILS # BLD AUTO: 2.14 K/UL — SIGNIFICANT CHANGE UP (ref 1.8–7.4)
NEUTROPHILS NFR BLD AUTO: 54.2 % — SIGNIFICANT CHANGE UP (ref 43–77)
NRBC # BLD: 0 /100 WBCS — SIGNIFICANT CHANGE UP (ref 0–0)
PLATELET # BLD AUTO: 170 K/UL — SIGNIFICANT CHANGE UP (ref 150–400)
RBC # BLD: 4.28 M/UL — SIGNIFICANT CHANGE UP (ref 3.8–5.2)
RBC # FLD: 12 % — SIGNIFICANT CHANGE UP (ref 10.3–14.5)
WBC # BLD: 3.94 K/UL — SIGNIFICANT CHANGE UP (ref 3.8–10.5)
WBC # FLD AUTO: 3.94 K/UL — SIGNIFICANT CHANGE UP (ref 3.8–10.5)

## 2023-01-18 PROCEDURE — 99213 OFFICE O/P EST LOW 20 MIN: CPT

## 2023-01-19 NOTE — ED ADULT NURSE NOTE - NSFALLRSKASSESSDT_ED_ALL_ED
· Patient has persistent hematuria due to bladder cancer  History of recurrent UTI on suppressive Bactrim  · CT A/P: Stable bladder wall thickening, compatible with known malignancy  Increased hyperdensity within the bladder lumen, compatible with new blood clot  · Recent admission at [facility] 12/12-12/22 for hematuria and obstructive uropathy in the setting of known bladder cancer s/p BCG and subsequent chemotherapy/radiation s/p bilateral chronic PCN tubes   Noted to have new right-sided hydronephrosis on CT s/p cystoscopy, clot evacuation w/ right ureteral stenting on 12/14 by Dr Marko Roth  · UA bloody, innumerable RBC/WBC, +leuks, no nitrites    Patient initially was on CBI per urology  Now transition to hand irrigation several times per day  Urology does not feel patient needs anything other than this right now    He unfortunately likely have chronic hematuria 08-Sep-2021 16:36

## 2023-01-22 NOTE — ASSESSMENT
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 \par \par She has no CRAB signs or symptoms and her most recent PET-CT imaging was in Jan 2022. Her disease consists mostly of elevated lambda light chain with an involved vs uninvolved ratio that remains < 100, which has improved slightly since her last visit. She does have a weak M-spike that is not quantifiable on recent blood work. Her last BM biopsy in 2/2021 showed 10% monotypic plasma cells which puts her on the border of MGUS/Smoldering Myeloma, both of which should be monitored and not treated with myeloma-directed therapies (pathology not verified here as of yet). At this time she does not meet revised IMWG criteria for multiple myeloma and remains on observation. \par \par She underwent a left posterior maxillary hard tissue biopsy on 4/6/22 that showed osteitis. She was treated for an oral abscess. She has not experienced any other infectious complaints since. She remains on Tocilizumab for vasculitis, which on recent PET-CT showed marked improvement. The Toci has contributed to elevated blood pressures, which is now improved on amlodipine (3 antihypertensives now). She is now on every 2 weeks toci going forward. \par \par \par Plan:\par - Repeat basic MGUS / smoldering myeloma labs with serum immunoelectrophoresis\par - Monitor serum immunoelectrophoresis every 3 months as long as stable\par - Hold off additional BM biopsies at this time, can repeat if significant changes in immunoelectrophoresis studies or CBC\par - Rheum Dr Сергей Patino for management of vasculitis, currently on Tocilizumab q2 weeks.\par - HTN: follow-up PMD as needed, now on triple therapy\par - Current smoker: Has tried multiple smoking cessation aids without success, discussed cessation options again today\par - Next visit in 3 months\par \par _____\par I personally have spent a total of 25 minutes of time on the date of this encounter reviewing test results, documenting findings, providing education, coordinating care and directly consulting with the patient and/or designated family member.

## 2023-01-22 NOTE — HISTORY OF PRESENT ILLNESS
[Disease:__________________________] : Disease: [unfilled] [Treatment Protocol] : Treatment Protocol [de-identified] : 60 yo post-menopausal female with a history of Hep C (s/p curative therapy), vasculitis / aortitis (active), migraines and MGUS (dx 2/2021) here to establish care with a new hematologist. She was previously seen by Dr Tobias Bocanegra who diagnosed her with MGUS after performing a myeloma workup. 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 No increase in blasts.\par Flow: Monoclonal lambda plasma cell population that is CD38+ encompassed < 1 % of analyzed WBCs\par Cytogenetics: Normal 46,XX female karyotype\par FISH: Positive for CCND1/IGH t(11;14) -- Negative for RB1 deletion, TP53 (17p13) deletion, duplication 1q21, FGFR3/IGH t(4;14) and IGH/MAF t(14;16)\par \par Imaging:\par Skeletal Survey 2/8/21: No lytic or blastic osseous lesions\par CT Chest (w/o contrast) 2/17/21: 3 mm subpleural nodule thought to be postinflammatory or benign lymph node. No destructive lesions or other significant findings.\par \par PET-CT 1/19/22: \par 1. FDG avidity along the walls of the thoracic and abdominal aorta, with activity extending into the right brachiocephalic and bilateral subclavian arteries, and bilateral iliac and femoral vessels. This is concerning for vasculitis. \par 2. Cystic lesions in bilateral breasts with associated FDG avid soft tissue in the anterior posterior aspects.\par 3. No abnormal FDG activity in bones\par \par Social Hx:\par , lives in [place], works as an , born in [place]\par Current Smoker \par No significant Alcohol use\par \par Allergies:\par NKDA\par \par ========================================================\par Care Providers\par \par Prior Hematologist / Oncologist: Dr Tobias Damon ([facility])\par Rheumatologist: Dr Сергей Patino\par PMD: Dr. Rolly Beard: [phone] [de-identified] : N/A [de-identified] : See above [de-identified] : Positive for t(11;14) which is favorable in MGUG/Myeloma [de-identified] : 2/9/22: Initial Visit. Ms Rigoberto presents today to establish care with a new hematology team connected to [facility]. Today she complains of severe neck, right ankle, leg/back pain limiting her ability to work and stand for long periods of time. She has had a spinal workup in the past with multiple herniated discs, and also has had steroid injections without significant improvement as of recent (last injection on Alhaji 3, 2022). She has had severe pain in her right ankle for many weeks now and she feels it may radiate from her back or leg. She denied any numbness. This has significantly affected her ability to function both at home and at work and descending altitude on the plane makes the pain unbearable. Recent PET-CT showed recurrent aortitis in Jan 2022. She denies any recent fevers, chills, night sweats, significant weight loss. She also reports that she was recently told that she has smoldering myeloma, which at this time does not appear to be true.\par \par 5/11/22: Follow- up. She is getting treated for her vasculitis with Dr Сергей Patino with no recent flare ups. She is now off steroids. She still has been in severe pain in her right ankle (uses a brace now), leg/back pain. Cataract surgery in left eye planned on 5/20/22. She is getting a mammogram and ultrasound on 5/31/22. Denies swelling in ankles even when flying. She bruises easily. Recently had a left tooth abscess that caused facial swelling 3 weeks ago.  She denies any recent fevers, chills, night sweats, significant weight loss. No new lumps or bumps. \par \par 8/24/22: Follow-up. Her PCP is concerned about her HTN. Her BP is slightly elevated today in 150/80s range. We checked her home BP device and it is higher than our measurements, she was advised to check also when she sees her PCP. She has been on metoprolol 50 mg for 2 months and losartan 50 mg for last one month with some very mild improvement. She is still working as a  for [employer], her pain is much better controlled and she is able to work now without limitations. Overall she has had a significant improvement since starting Tocilizumab and trigger point injections in her back and right ankle. Compression stockings also help. A few months ago and was hospitalized for abscess, received abx, with subsequent yeast infection, however he has otherwise not experienced any other recent infections.. Patient still smokes cigarettes. No new noticeable masses No trouble swallowing,issues with bowel or urinary function, changes in appetite. \par \par 1/18/23: Follow-up. Ms Franklin has had significant improvement in her foot and back pain. She continues to work as a . Her vasculitis has also been under better control, and she is now on less frequent dosing of tocilizumab. She has not experienced any recent infections. She denies constitutional complaints. \par \par ___________\par A comprehensive review of systems was performed including constitutional, eyes, ENT, cardiovascular, respiratory, gastrointestinal, genitourinary, musculoskeletal, integumentary, neurological, psychiatric and hematologic / lymphatic. All pertinent positives are included in the H&P under interval history above and the remaining review of systems listed are negative.  [FreeTextEntry1] : Observation

## 2023-01-31 LAB
ALBUMIN MFR SERPL ELPH: 69.9 %
ALBUMIN SERPL ELPH-MCNC: 4.8 G/DL
ALBUMIN SERPL-MCNC: 4.4 G/DL
ALBUMIN/GLOB SERPL: 2.3 RATIO
ALP BLD-CCNC: 58 U/L
ALPHA1 GLOB MFR SERPL ELPH: 3.5 %
ALPHA1 GLOB SERPL ELPH-MCNC: 0.2 G/DL
ALPHA2 GLOB MFR SERPL ELPH: 11.5 %
ALPHA2 GLOB SERPL ELPH-MCNC: 0.7 G/DL
ALT SERPL-CCNC: 23 U/L
ANION GAP SERPL CALC-SCNC: 11 MMOL/L
AST SERPL-CCNC: 23 U/L
B-GLOBULIN MFR SERPL ELPH: 8.8 %
B-GLOBULIN SERPL ELPH-MCNC: 0.6 G/DL
B2 MICROGLOB SERPL-MCNC: 2.2 MG/L
BILIRUB SERPL-MCNC: 0.6 MG/DL
BUN SERPL-MCNC: 16 MG/DL
CALCIUM SERPL-MCNC: 9.6 MG/DL
CHLORIDE SERPL-SCNC: 109 MMOL/L
CO2 SERPL-SCNC: 26 MMOL/L
CREAT SERPL-MCNC: 0.7 MG/DL
DEPRECATED KAPPA LC FREE/LAMBDA SER: 0.1 RATIO
EGFR: 98 ML/MIN/1.73M2
GAMMA GLOB FLD ELPH-MCNC: 0.4 G/DL
GAMMA GLOB MFR SERPL ELPH: 6.3 %
GLUCOSE SERPL-MCNC: 101 MG/DL
IGA SER QL IEP: 48 MG/DL
IGG SER QL IEP: 441 MG/DL
IGM SER QL IEP: 18 MG/DL
INTERPRETATION SERPL IEP-IMP: NORMAL
KAPPA LC CSF-MCNC: 8.89 MG/DL
KAPPA LC SERPL-MCNC: 0.88 MG/DL
M PROTEIN SPEC IFE-MCNC: NORMAL
POTASSIUM SERPL-SCNC: 4.7 MMOL/L
PROT SERPL-MCNC: 6.3 G/DL
SODIUM SERPL-SCNC: 145 MMOL/L

## 2023-02-02 ENCOUNTER — TRANSCRIPTION ENCOUNTER (OUTPATIENT)
Age: 63
End: 2023-02-02

## 2023-02-28 ENCOUNTER — OUTPATIENT (OUTPATIENT)
Dept: OUTPATIENT SERVICES | Facility: HOSPITAL | Age: 63
LOS: 1 days | End: 2023-02-28
Payer: COMMERCIAL

## 2023-02-28 ENCOUNTER — APPOINTMENT (OUTPATIENT)
Dept: NUCLEAR MEDICINE | Facility: IMAGING CENTER | Age: 63
End: 2023-02-28
Payer: COMMERCIAL

## 2023-02-28 ENCOUNTER — RESULT REVIEW (OUTPATIENT)
Age: 63
End: 2023-02-28

## 2023-02-28 DIAGNOSIS — Z98.89 OTHER SPECIFIED POSTPROCEDURAL STATES: Chronic | ICD-10-CM

## 2023-02-28 DIAGNOSIS — I42.2 OTHER HYPERTROPHIC CARDIOMYOPATHY: ICD-10-CM

## 2023-02-28 DIAGNOSIS — I51.7 CARDIOMEGALY: ICD-10-CM

## 2023-02-28 PROCEDURE — 78429 MYOCRD IMG PET 1 STD W/CT: CPT

## 2023-02-28 PROCEDURE — 78815 PET IMAGE W/CT SKULL-THIGH: CPT | Mod: 26

## 2023-02-28 PROCEDURE — 78429 MYOCRD IMG PET 1 STD W/CT: CPT | Mod: 26

## 2023-02-28 PROCEDURE — 78451 HT MUSCLE IMAGE SPECT SING: CPT | Mod: 26

## 2023-02-28 PROCEDURE — 78451 HT MUSCLE IMAGE SPECT SING: CPT

## 2023-02-28 PROCEDURE — A9552: CPT

## 2023-02-28 PROCEDURE — 78815 PET IMAGE W/CT SKULL-THIGH: CPT

## 2023-02-28 PROCEDURE — A9500: CPT

## 2023-03-10 ENCOUNTER — APPOINTMENT (OUTPATIENT)
Dept: RHEUMATOLOGY | Facility: CLINIC | Age: 63
End: 2023-03-10

## 2023-03-21 ENCOUNTER — APPOINTMENT (OUTPATIENT)
Dept: RHEUMATOLOGY | Facility: CLINIC | Age: 63
End: 2023-03-21
Payer: COMMERCIAL

## 2023-03-21 VITALS
TEMPERATURE: 97.1 F | WEIGHT: 154 LBS | DIASTOLIC BLOOD PRESSURE: 82 MMHG | OXYGEN SATURATION: 98 % | HEART RATE: 69 BPM | BODY MASS INDEX: 24.75 KG/M2 | HEIGHT: 66 IN | SYSTOLIC BLOOD PRESSURE: 148 MMHG | RESPIRATION RATE: 16 BRPM

## 2023-03-21 LAB
BASOPHILS # BLD AUTO: 0.04 K/UL
BASOPHILS NFR BLD AUTO: 1 %
EOSINOPHIL # BLD AUTO: 0.1 K/UL
EOSINOPHIL NFR BLD AUTO: 2.4 %
HCT VFR BLD CALC: 42.4 %
HGB BLD-MCNC: 13.9 G/DL
IMM GRANULOCYTES NFR BLD AUTO: 0 %
LYMPHOCYTES # BLD AUTO: 1.2 K/UL
LYMPHOCYTES NFR BLD AUTO: 28.9 %
MAN DIFF?: NORMAL
MCHC RBC-ENTMCNC: 31.4 PG
MCHC RBC-ENTMCNC: 32.8 GM/DL
MCV RBC AUTO: 95.9 FL
MONOCYTES # BLD AUTO: 0.48 K/UL
MONOCYTES NFR BLD AUTO: 11.6 %
NEUTROPHILS # BLD AUTO: 2.33 K/UL
NEUTROPHILS NFR BLD AUTO: 56.1 %
PLATELET # BLD AUTO: 187 K/UL
RBC # BLD: 4.42 M/UL
RBC # FLD: 12.3 %
WBC # FLD AUTO: 4.15 K/UL

## 2023-03-21 PROCEDURE — 99215 OFFICE O/P EST HI 40 MIN: CPT

## 2023-03-21 RX ORDER — LOSARTAN POTASSIUM 50 MG/1
50 TABLET, FILM COATED ORAL DAILY
Refills: 0 | Status: COMPLETED | COMMUNITY
Start: 2022-08-24 | End: 2023-03-21

## 2023-03-21 NOTE — ASSESSMENT
[FreeTextEntry1] : 1- Large Vessel Vasculitis \par Aortitis with wall thickening of TA Ao and bilateral LORI seen on CT A/P since 2/2021 \par IgG4 levels normal ,ALDAIR, ANCA and all serology negative, elevated ESR, CRP\par follow-up CTA of neck and chest were normal, MRA chest 8/20/2021 - no evidence of aortitis \par CTA chest 9/8/21, discussed with radiologist - Increased thickness in aortic wall compared to 5/21 imaging\par *PET CT 1/19/22: FDG avidity along the walls of the thoracic and abdominal aorta with activity extending into the right BC, bilateral SC arteries, bilateral iliac and femoral vessels.\par **Started TCZ weekly on 2/25/22 \par - PET CT Nov 2022 : almost resolution of FDG activity in the walls of the thoracic and abdominal aorta and its branches suggesting response to therapy\par \par -switched to TCZ q 2 weeks dosing since December 2022\par -obtain ESR and CRP today\par \par 2-High risk medication use:\par -obtain monitoring blood work today\par -check HCV RNA (history of HCV cured)\par -Lipid monitoring with PMD/cardiology\par \par \par 3- Cardiomyopathy, possible sarcoid CM \par Cardiac PET reviewed today and discussed with radiologist: Focal FDG uptake at the left ventricular apex with associated small perfusion defect, corresponding to myocardial wall thickening and late gadolinium enhancement seen on MRI, favor cardiac sarcoidosis\par No FDG avid mediastinal or hilar lymphadenopathy or pulmonary nodules\par Patient has a longstanding history of arrhythmia and palpitation, status post ablation many years ago\par I left a message with cardiologist Dr. Esquivel to discuss treatment options\par  \par -Check ACE and vitamin D levels today\par \par 4-osteoporosis\par DEXA 2/16/2022: T score L-spine -2.7\par Patient was not previously aware of these results\par \par -Discussed treatment options today\par She is agreeable to starting bisphosphonates\par risks/benefits/alternatives/side effects reviewed with patient at length\par -Obtain vitamin D level today\par \par 5- Vaccination status/immunosuppressed status: \par COVID vaccine completed , booster :11/30/21 - second booster received 5/4/22, third booster 11/20/22\par PCV 13 -received\par PPSV 23 administered 2/10/22\par Shingrix-first dose 2/25-**Second dose administered 6/18/22\par 6- MGUS, following with hematologist \par \par RTO in 4 weeks or earlier if needed

## 2023-03-21 NOTE — PHYSICAL EXAM
[General Appearance - Alert] : alert [General Appearance - In No Acute Distress] : in no acute distress [Auscultation Breath Sounds / Voice Sounds] : lungs were clear to auscultation bilaterally [Heart Sounds] : normal S1 and S2 [Musculoskeletal - Swelling] : no joint swelling seen [] : no rash [Impaired Insight] : insight and judgment were intact

## 2023-03-21 NOTE — HISTORY OF PRESENT ILLNESS
[FreeTextEntry1] : #At today's visit:\par \par has been doing well overall , no complaints\par has been taking Actemra q 2 weeks since last visit in December\par right ankle pain recurred, she received another lidocaine/steroid injection \par \par \par -----\par \par 1. Large vessel vasculitis \par aortitis on imaging March 2021 \par IgG4 wnl , ALDAIR, ANCA negative, elevated ESR and CRP (on steroid) \par CT angio neck and chest May 2021: neg \par CT Abd/Pelvis showed wall thickening of thoracic and abd aorta, consistent with large vessel vasculitis \par PET scan - Jan 2022 - activities in thoracic, abd aorta and common iliac vessels \par Actemra started Feb 2022 \par \par 2. History of +HCV, status post treatment>6 years ago ~2015 and cured)\par  HCV RNA undetectable \par \par 3.MGUS \par \par 4. positive quantiferon\par  started INH and B6 5/3/2021, stopped in July 2021 due to transaminitis \par completed a course of rifampin from 09/2021- 1/1/22. \par \par \par Medications:\par Losartan 50 mg od\par Metoprolol 100 mg od\par Amlodipine 2.5 mg od\par Actemra s.c

## 2023-03-21 NOTE — ADDENDUM
[FreeTextEntry1] : Spoke with Dr Negro \par discussed MRI and PET results\par he will arrange for a holter to decide if EP studies are needed\par we reviewed treatment options, likely proceed with MTX\par fup cardiac MRI in 3 months\par \par

## 2023-03-30 ENCOUNTER — TRANSCRIPTION ENCOUNTER (OUTPATIENT)
Age: 63
End: 2023-03-30

## 2023-03-30 LAB
24R-OH-CALCIDIOL SERPL-MCNC: 24.1 PG/ML
25(OH)D3 SERPL-MCNC: 19.2 NG/ML
ACE BLD-CCNC: 77 U/L
ALBUMIN SERPL ELPH-MCNC: 4.7 G/DL
ALP BLD-CCNC: 59 U/L
ALT SERPL-CCNC: 29 U/L
ANION GAP SERPL CALC-SCNC: 13 MMOL/L
AST SERPL-CCNC: 21 U/L
BILIRUB SERPL-MCNC: 0.6 MG/DL
BUN SERPL-MCNC: 15 MG/DL
CALCIUM SERPL-MCNC: 9.5 MG/DL
CALCIUM SERPL-MCNC: 9.5 MG/DL
CHLORIDE SERPL-SCNC: 108 MMOL/L
CO2 SERPL-SCNC: 25 MMOL/L
CREAT SERPL-MCNC: 0.72 MG/DL
CRP SERPL-MCNC: <3 MG/L
EGFR: 94 ML/MIN/1.73M2
ERYTHROCYTE [SEDIMENTATION RATE] IN BLOOD BY WESTERGREN METHOD: < 2 MM/HR
HCV RNA SERPL NAA+PROBE-LOG IU: NOT DETECTED LOGIU/ML
HEPC RNA INTERP: NOT DETECTED
MAGNESIUM SERPL-MCNC: 1.9 MG/DL
PARATHYROID HORMONE INTACT: 77 PG/ML
PHOSPHATE SERPL-MCNC: 4.2 MG/DL
POTASSIUM SERPL-SCNC: 4.6 MMOL/L
PROT SERPL-MCNC: 6.4 G/DL
SODIUM SERPL-SCNC: 146 MMOL/L

## 2023-04-24 ENCOUNTER — APPOINTMENT (OUTPATIENT)
Dept: RHEUMATOLOGY | Facility: CLINIC | Age: 63
End: 2023-04-24
Payer: COMMERCIAL

## 2023-04-24 ENCOUNTER — LABORATORY RESULT (OUTPATIENT)
Age: 63
End: 2023-04-24

## 2023-04-24 VITALS
TEMPERATURE: 97.3 F | BODY MASS INDEX: 25.55 KG/M2 | OXYGEN SATURATION: 98 % | HEIGHT: 66 IN | SYSTOLIC BLOOD PRESSURE: 171 MMHG | HEART RATE: 88 BPM | RESPIRATION RATE: 16 BRPM | WEIGHT: 159 LBS | DIASTOLIC BLOOD PRESSURE: 67 MMHG

## 2023-04-24 VITALS — HEART RATE: 71 BPM | SYSTOLIC BLOOD PRESSURE: 122 MMHG | DIASTOLIC BLOOD PRESSURE: 73 MMHG

## 2023-04-24 PROCEDURE — 99215 OFFICE O/P EST HI 40 MIN: CPT

## 2023-04-26 LAB
ALBUMIN SERPL ELPH-MCNC: 4.5 G/DL
ALP BLD-CCNC: 58 U/L
ALT SERPL-CCNC: 50 U/L
ANION GAP SERPL CALC-SCNC: 12 MMOL/L
AST SERPL-CCNC: 30 U/L
BASOPHILS # BLD AUTO: 0.03 K/UL
BASOPHILS NFR BLD AUTO: 0.6 %
BILIRUB SERPL-MCNC: 0.9 MG/DL
BUN SERPL-MCNC: 12 MG/DL
CALCIUM SERPL-MCNC: 9.8 MG/DL
CHLORIDE SERPL-SCNC: 108 MMOL/L
CO2 SERPL-SCNC: 25 MMOL/L
CREAT SERPL-MCNC: 0.7 MG/DL
CRP SERPL-MCNC: <3 MG/L
EGFR: 98 ML/MIN/1.73M2
EOSINOPHIL # BLD AUTO: 0.1 K/UL
EOSINOPHIL NFR BLD AUTO: 2 %
ERYTHROCYTE [SEDIMENTATION RATE] IN BLOOD BY WESTERGREN METHOD: < 2 MM/HR
GLUCOSE SERPL-MCNC: 99 MG/DL
HBV CORE IGG+IGM SER QL: NONREACTIVE
HBV SURFACE AG SER QL: NONREACTIVE
HCT VFR BLD CALC: 40.6 %
HCV AB SER QL: REACTIVE
HCV S/CO RATIO: 14.72 S/CO
HGB BLD-MCNC: 13.6 G/DL
IMM GRANULOCYTES NFR BLD AUTO: 0.2 %
LYMPHOCYTES # BLD AUTO: 1.45 K/UL
LYMPHOCYTES NFR BLD AUTO: 28.6 %
MAN DIFF?: NORMAL
MCHC RBC-ENTMCNC: 31.4 PG
MCHC RBC-ENTMCNC: 33.5 GM/DL
MCV RBC AUTO: 93.8 FL
MONOCYTES # BLD AUTO: 0.5 K/UL
MONOCYTES NFR BLD AUTO: 9.9 %
NEUTROPHILS # BLD AUTO: 2.98 K/UL
NEUTROPHILS NFR BLD AUTO: 58.7 %
PLATELET # BLD AUTO: 191 K/UL
POTASSIUM SERPL-SCNC: 4.4 MMOL/L
PROT SERPL-MCNC: 6.3 G/DL
RBC # BLD: 4.33 M/UL
RBC # FLD: 12.2 %
SODIUM SERPL-SCNC: 145 MMOL/L
WBC # FLD AUTO: 5.07 K/UL

## 2023-04-26 NOTE — HISTORY OF PRESENT ILLNESS
[FreeTextEntry1] : #At today's visit:\par \par has been doing well overall \par reporting feeling short of breath when having the mask on and try to bend forward during work \par has been taking Actemra q 2 weeks since December\par right ankle pain recurred, she received another lidocaine/steroid injection \par \par \par -----\par \par 1. Large vessel vasculitis \par aortitis on imaging March 2021 \par IgG4 wnl , ALDAIR, ANCA negative, elevated ESR and CRP (on steroid) \par CT angio neck and chest May 2021: neg \par CT Abd/Pelvis showed wall thickening of thoracic and abd aorta, consistent with large vessel vasculitis \par PET scan - Jan 2022 - activities in thoracic, abd aorta and common iliac vessels \par Actemra started Feb 2022 \par \par 2. History of +HCV, status post treatment>6 years ago ~2015 and cured)\par  HCV RNA undetectable \par \par 3.MGUS \par \par 4. positive quantiferon\par  started INH and B6 5/3/2021, stopped in July 2021 due to transaminitis \par completed a course of rifampin from 09/2021- 1/1/22. \par \par \par Medications:\par Losartan 50 mg od\par Metoprolol 100 mg od\par Amlodipine 2.5 mg od\par Actemra s.c \par

## 2023-04-26 NOTE — PHYSICAL EXAM
[General Appearance - Alert] : alert [General Appearance - In No Acute Distress] : in no acute distress [Auscultation Breath Sounds / Voice Sounds] : lungs were clear to auscultation bilaterally [Heart Sounds] : normal S1 and S2 [Musculoskeletal - Swelling] : no joint swelling seen [Skin Turgor] : normal skin turgor [Impaired Insight] : insight and judgment were intact

## 2023-04-26 NOTE — ASSESSMENT
[FreeTextEntry1] :  1- Large Vessel Vasculitis \par Aortitis with wall thickening of TA Ao and bilateral LORI seen on CT A/P since 2/2021 \par IgG4 levels normal ,ALDAIR, ANCA and all serology negative, elevated ESR, CRP\par \par *PET CT 1/19/22: FDG avidity along the walls of the thoracic and abdominal aorta with activity extending into the right BC, bilateral SC arteries, bilateral iliac and femoral vessels.\par **Started TCZ weekly on 2/25/22 \par - PET CT Nov 2022 : almost resolution of FDG activity in the walls of the thoracic and abdominal aorta and its branches suggesting response to therapy\par -switched to TCZ q 2 weeks dosing since December 2022\par \par -continue with TCZ every 2 weeks \par -obtain ESR and CRP today\par \par \par 2-High risk medication use:\par -obtain monitoring blood work today\par -check HCV RNA (history of HCV cured)\par -Lipid monitoring with PMD/cardiology\par \par \par 3- Cardiomyopathy, 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 Esquivel\par \par -patient has no overt extra cardiac manifestations of sarcoidosis (seen on PET or historically)\par -discussed that aortitis is very rare in sarcoidosis and it is possible that she has two separate issues \par -ACE normal, Vitamin D 1, 25 normal, no hyper Ca\par - opthalmology evaluation for screening\par -to schedule an appointment with card/EP regarding further work up/ preventative measures for potential arrhythmias\par -discussed the option of adding MTX to her current regimen vs stopping TCZ and switching to TNFi\par risks/benefits/alternatives/side effects reviewed with patient at length and printed information provided for review \par would need to clear with cardiology given that TNFi can worsen HF\par and she has previous risk factors (latent TB, HCV)\par \par \par 4-osteoporosis\par DEXA 2/16/2022: T score L-spine -2.7\par Started Alendronate March 2023, tolerating well \par \par 5- Vaccination status/immunosuppressed status: \par COVID vaccine completed , booster :11/30/21 - second booster received 5/4/22, third booster 11/20/22\par PCV 13 -received\par PPSV 23 administered 2/10/22\par Shingrix-first dose 2/25-**Second dose administered 6/18/22\par \par 6- MGUS, following with hematologist \par \par 7- History of positive QTB \par completed a course of rifampin from 09/2021- 1/1/22\par History of HCV treatment>6 years ago ~2015- HCV RNA undetectable \par to clear with ID if proceeding with TNFi \par \par \par RTO in 4 weeks or earlier if needed. \par

## 2023-05-04 ENCOUNTER — TRANSCRIPTION ENCOUNTER (OUTPATIENT)
Age: 63
End: 2023-05-04

## 2023-05-15 ENCOUNTER — APPOINTMENT (OUTPATIENT)
Dept: PULMONOLOGY | Facility: CLINIC | Age: 63
End: 2023-05-15
Payer: COMMERCIAL

## 2023-05-15 VITALS
DIASTOLIC BLOOD PRESSURE: 76 MMHG | HEIGHT: 63.78 IN | WEIGHT: 152 LBS | OXYGEN SATURATION: 97 % | BODY MASS INDEX: 26.27 KG/M2 | HEART RATE: 72 BPM | SYSTOLIC BLOOD PRESSURE: 137 MMHG

## 2023-05-15 DIAGNOSIS — F17.200 NICOTINE DEPENDENCE, UNSPECIFIED, UNCOMPLICATED: ICD-10-CM

## 2023-05-15 PROCEDURE — 94010 BREATHING CAPACITY TEST: CPT

## 2023-05-15 PROCEDURE — 99204 OFFICE O/P NEW MOD 45 MIN: CPT | Mod: 25

## 2023-05-15 PROCEDURE — 94726 PLETHYSMOGRAPHY LUNG VOLUMES: CPT

## 2023-05-15 PROCEDURE — ZZZZZ: CPT

## 2023-05-15 PROCEDURE — 94729 DIFFUSING CAPACITY: CPT

## 2023-05-15 NOTE — ASSESSMENT
[FreeTextEntry1] : 1. Hx sarcoidosis: No evidence on imaging or PFTs of lung involvment. Patient also with no evidence copd. Advised smoking cessation but she refuses.

## 2023-05-15 NOTE — HISTORY OF PRESENT ILLNESS
[TextBox_4] : Patient with hx of aortitis, myocardial sarcoidosis, here for lung 'check up'. SHe has no dyspnea or cough. Long smoking hx, started smoking age 17, still active smoker. 12 pack years total (5 cigarettes daily). Unwilling to quit.

## 2023-05-17 ENCOUNTER — NON-APPOINTMENT (OUTPATIENT)
Age: 63
End: 2023-05-17

## 2023-05-17 ENCOUNTER — TRANSCRIPTION ENCOUNTER (OUTPATIENT)
Age: 63
End: 2023-05-17

## 2023-05-18 ENCOUNTER — TRANSCRIPTION ENCOUNTER (OUTPATIENT)
Age: 63
End: 2023-05-18

## 2023-05-18 RX ORDER — ADALIMUMAB 40MG/0.4ML
KIT SUBCUTANEOUS
Qty: 2 | Refills: 2 | Status: ACTIVE | COMMUNITY
Start: 2023-05-17

## 2023-05-19 ENCOUNTER — TRANSCRIPTION ENCOUNTER (OUTPATIENT)
Age: 63
End: 2023-05-19

## 2023-05-22 ENCOUNTER — TRANSCRIPTION ENCOUNTER (OUTPATIENT)
Age: 63
End: 2023-05-22

## 2023-05-23 ENCOUNTER — TRANSCRIPTION ENCOUNTER (OUTPATIENT)
Age: 63
End: 2023-05-23

## 2023-05-24 ENCOUNTER — TRANSCRIPTION ENCOUNTER (OUTPATIENT)
Age: 63
End: 2023-05-24

## 2023-05-25 ENCOUNTER — NON-APPOINTMENT (OUTPATIENT)
Age: 63
End: 2023-05-25

## 2023-05-26 ENCOUNTER — NON-APPOINTMENT (OUTPATIENT)
Age: 63
End: 2023-05-26

## 2023-05-26 ENCOUNTER — APPOINTMENT (OUTPATIENT)
Dept: CARDIOLOGY | Facility: CLINIC | Age: 63
End: 2023-05-26
Payer: COMMERCIAL

## 2023-05-26 VITALS
DIASTOLIC BLOOD PRESSURE: 82 MMHG | TEMPERATURE: 97.3 F | HEART RATE: 74 BPM | HEIGHT: 63 IN | WEIGHT: 155 LBS | BODY MASS INDEX: 27.46 KG/M2 | OXYGEN SATURATION: 99 % | SYSTOLIC BLOOD PRESSURE: 147 MMHG

## 2023-05-26 PROCEDURE — 93000 ELECTROCARDIOGRAM COMPLETE: CPT

## 2023-05-26 PROCEDURE — 99205 OFFICE O/P NEW HI 60 MIN: CPT | Mod: 25

## 2023-05-30 ENCOUNTER — TRANSCRIPTION ENCOUNTER (OUTPATIENT)
Age: 63
End: 2023-05-30

## 2023-05-31 ENCOUNTER — TRANSCRIPTION ENCOUNTER (OUTPATIENT)
Age: 63
End: 2023-05-31

## 2023-05-31 RX ORDER — AMLODIPINE BESYLATE 10 MG/1
10 TABLET ORAL
Qty: 90 | Refills: 1 | Status: DISCONTINUED | COMMUNITY
Start: 2023-05-26 | End: 2023-05-31

## 2023-05-31 NOTE — ASSESSMENT
[FreeTextEntry1] : Ms. Franklin is a 63 y/o F w/ h/o large vessel vasculitis (aortitis Dx 3/21; on tocilizumab [Actemra]), possible cardiac sarcoid (based on PET scan), PVC s/p ablation (4/16), active tobacco use (12 pk-yr; 5 cig/day) who presents for establishment of care. Other comorbidities include Hepatitis C (s/p Rx), latent TBI s/p Rx with INH x 6 months. Currently appears NYHA class I without limitations and is otherwise compensated but hypertensive. Recent consideration of cardiac sarcoid given LGE in apex on MRI with perfusion defect and FDG uptake in same region on cardiac PET scan. While sarcoid can also cause aortitis it is rare and in her case, it would be unusual to have cardiac and large vessel involvement without other systemic involvement. Consideration is also apical HCM especially in light of ECG abnormalities that is consistent with this, however wouldn't cause FDG uptake as was seen. Of note, has a strong family history of a cardiomyopathy and has undergone genetic testing at [facility] which will be helpful to review. \par \par 1. Possible sarcoidosis - unclear but suspicion raised due to h/o PVC ablation, Aortitis, and recent FDG uptake on cardiac PET \par - no potential site of biopsy\par - given family history of SCD, would recommend EP study however will do a 2 week MCOT first \par - ESR/CRP normal (however on Actemra)\par - will obtain genetic testing\par - may consider trial of steroids with repeat PET scan to see if FDG uptake resolves prior to considering a steroid-sparing regiment (e.g. MTX/Humira); will d/w Dr. Сергей Patino\par \par 2. HTN - uncontrolled\par - will review BP medications\par - will start losartan 25 mg daily \par \par 3. PVCs - quiescent; s/p ablation 2016\par - on toprol 75 mg daily\par - MCOT as above\par \par RTC 2 months \par

## 2023-05-31 NOTE — CARDIOLOGY SUMMARY
[de-identified] : \par 5/26/23 - NSR, LVH with TWI inferior and lateral [de-identified] : \par 9/9/21 - TTE - EF 55%, mild-mod MR, mildly dilated LA, apex appears thickened, nl RV size/function, RVSP 29 [de-identified] : \par 12/1/22 - Cardiac MRI - EF 53%, LA prominent, patchy area of high signal in inferior and inferolateral walls measuring up to 1.4 cm (considerations include apical HCM or cardiac sarcoid) [de-identified] : \par 2/28/23 - Myocardial/Whole body PET - no FDG avid LN; no lung nodules; small perfusion defect at LV apex; EF 76%; increased FDG uptake at LV apex (SUV 4.3; corresponds to wall thickening and LGE seen on cardiac MRI); diffuse mild FDG uptake in walls of aorta (most marked in thoracic; decreased since 1/19/22)\par \par 11/29/22 (on Actemra) - whole body PET - interval decrease/resolution of FDG activity in walls of thoracic and abdominal aorta and its branches suggestiing response to interval therapy \par \par 1/19/22 - Whole body PET - FDG avidity in walls of thoracic and abdominal aorta with activity extending into R brachiocephalic and b/l subclavian arteries and b/l iliac and femoral vessels\par  [de-identified] : \par 5/15/23 PFTs FEV1 2.17 (88%), FVC 2.75 (85%), FEV1/FVC 79%, DLCO 69% (normal)

## 2023-05-31 NOTE — HISTORY OF PRESENT ILLNESS
[FreeTextEntry1] : Ms. Franklin is a 61 y/o F w/ h/o large vessel vasculitis (aortitis Dx 3/21; on tocilizumab [Actemra]), possible cardiac sarcoid (based on PET scan), prior PVC ablation (4/2016), active tobacco use (12 pk-yr; 5 cig/day) who presents for establishment of care. Other comorbidities include Hepatitis C (s/p Rx), latent TBI s/p Rx with INH x 6 months. Referred by Dr. Сергей Patino (rheumatology).Also followed by Dr. Mac Best (primary cardiologist). \par \par Per patient, cardiac history began 8 years prior (age 55) when was feeling well but was incidentally found to have extra heart beats and was admitted at [facility]. Was found to have high burden of PVCs. Underwent a PVC ablation by Dr. Hook. Subsequently, has been followed by Dr. Best. \par \par In 2021, had pain in her torso while flying (is a ). Had gone to ER in [place] and had a CT scan abdomen that showed aortitis. When returned had established care with Dr. Сергей Patino. Was placed on steroids with improvement but had weight gain. Was switched to Actemra in 2022 with improvement but was complicated by high blood pressure; now takes Actemra every 2 weeks due to improvement on PET scan. BP has since been improved. \par \par Had been well but for unclear reasons had a cardiac MRI last year which was suggestive of sarcoidosis so had a dedicated PET scan 2/28/23 to evaluate for cardiac sarcoid which was positive. Was recommended to see Dr. Jimbo Esquivel ([place]) who she saw a couple of times. Was also referred to EP (Dr. Quintin Huber) who she saw 4 weeks ago. Was recommended to have an EP study but didn't feel comfortable continuing care there and wanted to consolidate care at [facility]. \par \par Still working as a  which she enjoys. Would like to avoid a defibrillator as she's concerned about the constraints it may have on her lifestyle. Doesn't feel palpitations. Had a 48 hour Holter monitor in January with Dr. Best which didn't show any arrhythmias reportedly. \par \par Denies visual issues like uveitis. Denies skin rashes. Denies cough. \par \par Exertional tolerance limited by R ankle fracture/arthritis. Denies dyspnea with exertion, orthopnea/PND, LE edema. Denies history of syncope.

## 2023-06-12 ENCOUNTER — TRANSCRIPTION ENCOUNTER (OUTPATIENT)
Age: 63
End: 2023-06-12

## 2023-06-13 ENCOUNTER — NON-APPOINTMENT (OUTPATIENT)
Age: 63
End: 2023-06-13

## 2023-06-20 ENCOUNTER — APPOINTMENT (OUTPATIENT)
Dept: RHEUMATOLOGY | Facility: CLINIC | Age: 63
End: 2023-06-20
Payer: COMMERCIAL

## 2023-06-20 VITALS
DIASTOLIC BLOOD PRESSURE: 75 MMHG | HEART RATE: 69 BPM | WEIGHT: 154 LBS | HEIGHT: 63 IN | SYSTOLIC BLOOD PRESSURE: 136 MMHG | TEMPERATURE: 98.6 F | OXYGEN SATURATION: 96 % | BODY MASS INDEX: 27.29 KG/M2

## 2023-06-20 PROCEDURE — 99215 OFFICE O/P EST HI 40 MIN: CPT

## 2023-06-20 NOTE — ASSESSMENT
[FreeTextEntry1] : 1- Large Vessel Vasculitis \par Aortitis with wall thickening of TA Ao and bilateral LORI seen on CT A/P since 2/2021 \par IgG4 levels normal ,ALDAIR, ANCA and all serology negative, elevated ESR, CRP\par \par *PET CT 1/19/22: FDG avidity along the walls of the thoracic and abdominal aorta with activity extending into the right BC, bilateral SC arteries, bilateral iliac and femoral vessels.\par **Started TCZ weekly on 2/25/22 \par - PET CT Nov 2022 : almost resolution of FDG activity in the walls of the thoracic and abdominal aorta and its branches suggesting response to therapy\par -switched to TCZ q 2 weeks dosing since December 2022\par \par []obtain ESR and CRP today\par []transition off Actemra as below \par \par \par 2-High risk medication use:\par -obtain monitoring blood work today\par -Lipid monitoring with PMD/cardiology\par \par \par 3- Cardiomyopathy, 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 Esquivel\par \par -patient has no overt extra cardiac manifestations of sarcoidosis (seen on PET or historically)\par -discussed that aortitis is very rare in sarcoidosis and it is possible that she has two separate issues \par -ACE normal, Vitamin D 1, 25 normal, no hyper Ca\par - opthalmology evaluation for screening\par -s.p mobile cardiac telemetry:  no arrhythmias \par -discussed the option of switching to MTX or TNFi\par discussed with patient and Dr Esquivel at length \par given her work and infectious risk, joint decision made to proceed with MTX first \par risks/benefits/alternatives/side effects reviewed with patient at length \par given history of HCV, will check FibroSURE and refer to hepatology/ fibroscan as baseline\par \par \par \par 4-osteoporosis\par DEXA 2/16/2022: T score L-spine -2.7\par Started Alendronate March 2023, tolerating well \par \par 5- Vaccination status/immunosuppressed status: \par COVID vaccine completed , booster :11/30/21 - second booster received 5/4/22, third booster 11/20/22\par PCV 13 -received\par PPSV 23 administered 2/10/22\par Shingrix-first dose 2/25-**Second dose administered 6/18/22\par \par 6- MGUS, following with hematologist \par \par 7- History of positive QTB \par completed a course of rifampin from 09/2021- 1/1/22\par \par 8-History of HCV treatment>6 years ago ~2015- HCV RNA undetectable \par check fibrosure, hepatology eval \par \par RTO in 4 weeks or earlier if needed. \par

## 2023-06-20 NOTE — HISTORY OF PRESENT ILLNESS
[FreeTextEntry1] : #Interval events:\par -feeling well overall\par -no SOB or cough\par no rashes, no joint swelling\par continues to have the ankle pain\par -remains on Actemra q 2 weeks\par -BP better controlled\par -s.p cardiac eval \par \par ==========================\par \par \par 1. Large vessel vasculitis \par aortitis on imaging March 2021 \par IgG4 wnl , ALDAIR, ANCA negative, elevated ESR and CRP (on steroid) \par CT angio neck and chest May 2021: neg \par CT Abd/Pelvis showed wall thickening of thoracic and abd aorta, consistent with large vessel vasculitis \par PET scan - Jan 2022 - activities in thoracic, abd aorta and common iliac vessels \par Actemra started Feb 2022 \par \par 2. History of +HCV, status post treatment>6 years ago ~2015 and cured)\par  HCV RNA undetectable \par \par 3.MGUS \par \par 4. positive quantiferon\par  started INH and B6 5/3/2021, stopped in July 2021 due to transaminitis \par completed a course of rifampin from 09/2021- 1/1/22. \par \par \par Medications:\par Losartan 50 mg od\par Metoprolol 100 mg od\par Amlodipine 2.5 mg od\par Actemra s.c \par \par \par

## 2023-06-20 NOTE — PHYSICAL EXAM
[General Appearance - Alert] : alert [General Appearance - In No Acute Distress] : in no acute distress [Sclera] : the sclera and conjunctiva were normal [Auscultation Breath Sounds / Voice Sounds] : lungs were clear to auscultation bilaterally [Heart Sounds] : normal S1 and S2 [] : no rash [No Focal Deficits] : no focal deficits [Impaired Insight] : insight and judgment were intact

## 2023-06-21 ENCOUNTER — TRANSCRIPTION ENCOUNTER (OUTPATIENT)
Age: 63
End: 2023-06-21

## 2023-06-21 LAB
ALBUMIN SERPL ELPH-MCNC: 5 G/DL
ALP BLD-CCNC: 55 U/L
ALT SERPL-CCNC: 46 U/L
ANION GAP SERPL CALC-SCNC: 13 MMOL/L
AST SERPL-CCNC: 34 U/L
BILIRUB SERPL-MCNC: 0.8 MG/DL
BUN SERPL-MCNC: 10 MG/DL
CALCIUM SERPL-MCNC: 9.5 MG/DL
CHLORIDE SERPL-SCNC: 108 MMOL/L
CO2 SERPL-SCNC: 25 MMOL/L
CREAT SERPL-MCNC: 0.71 MG/DL
CRP SERPL-MCNC: <3 MG/L
EGFR: 96 ML/MIN/1.73M2
ERYTHROCYTE [SEDIMENTATION RATE] IN BLOOD BY WESTERGREN METHOD: < 2 MM/HR
POTASSIUM SERPL-SCNC: 4.4 MMOL/L
PROT SERPL-MCNC: 6.7 G/DL
SODIUM SERPL-SCNC: 146 MMOL/L

## 2023-06-22 ENCOUNTER — TRANSCRIPTION ENCOUNTER (OUTPATIENT)
Age: 63
End: 2023-06-22

## 2023-06-24 ENCOUNTER — TRANSCRIPTION ENCOUNTER (OUTPATIENT)
Age: 63
End: 2023-06-24

## 2023-06-29 LAB
FIBROSIS SCORE: 0.5
FIBROSIS STAGE: NORMAL
FIBROSURE ALPHA 2 MACROGLOBULINS: 170 MG/DL
FIBROSURE ALT (SGPT): 20 IU/L
FIBROSURE APOLIPOPROTEIN A1: 80 MG/DL
FIBROSURE COMMENT 2: NORMAL
FIBROSURE GGT: 16 IU/L
FIBROSURE HAPTOGLOBIN: 12 MG/DL
FIBROSURE INTERPRETATIONS: NORMAL
FIBROSURE LIMITATIONS: NORMAL
FIBROSURE NECROINFLAMM ACTIVITY SCORING: NORMAL
FIBROSURE NECROINFLAMMAT ACTIVITY GRPFIBROSURE NECROINFLAMMA: NORMAL
FIBROSURE NECROINFLAMMAT ACTIVITY SCORE: 0.11
FIBROSURE SCORING: NORMAL
FIBROSURE TOTAL BILIRUBIN: 0.3 MG/DL

## 2023-07-05 ENCOUNTER — NON-APPOINTMENT (OUTPATIENT)
Age: 63
End: 2023-07-05

## 2023-07-12 ENCOUNTER — LABORATORY RESULT (OUTPATIENT)
Age: 63
End: 2023-07-12

## 2023-07-12 ENCOUNTER — APPOINTMENT (OUTPATIENT)
Dept: HEPATOLOGY | Facility: CLINIC | Age: 63
End: 2023-07-12
Payer: COMMERCIAL

## 2023-07-12 ENCOUNTER — OUTPATIENT (OUTPATIENT)
Dept: OUTPATIENT SERVICES | Facility: HOSPITAL | Age: 63
LOS: 1 days | Discharge: HOME | End: 2023-07-12

## 2023-07-12 VITALS
HEART RATE: 68 BPM | DIASTOLIC BLOOD PRESSURE: 80 MMHG | OXYGEN SATURATION: 98 % | WEIGHT: 153 LBS | SYSTOLIC BLOOD PRESSURE: 128 MMHG | HEIGHT: 64 IN | TEMPERATURE: 96.8 F | BODY MASS INDEX: 26.12 KG/M2

## 2023-07-12 DIAGNOSIS — R74.8 ABNORMAL LEVELS OF OTHER SERUM ENZYMES: ICD-10-CM

## 2023-07-12 DIAGNOSIS — D47.2 MONOCLONAL GAMMOPATHY: ICD-10-CM

## 2023-07-12 DIAGNOSIS — G56.01 CARPAL TUNNEL SYNDROME, RIGHT UPPER LIMB: Chronic | ICD-10-CM

## 2023-07-12 PROCEDURE — 99204 OFFICE O/P NEW MOD 45 MIN: CPT

## 2023-07-12 NOTE — REVIEW OF SYSTEMS
[Fever] : no fever [Chills] : no chills [Chest Pain] : no chest pain [Shortness Of Breath] : no shortness of breath [Cough] : no cough [Negative] : Heme/Lymph [FreeTextEntry9] : right ankle pain

## 2023-07-12 NOTE — ASSESSMENT
[FreeTextEntry1] : 63 yoF with HTN, HLD, osteoporosis, MGUS, large vessel vasculitis, cardiomyopathy/cardiac sarcoidosis here for liver evaluation referred by her rheumatologist Dr. Neva Patino b/c of consideration in starting methotrexate for cardiac sarcoidosis. \par \par # H/o HCV\par -Was treated with DAA around 2015 and achieved SVR. Her last HCVRNA in April 2023 was not detected. HCV fibrosure on 6/20/23 showed moderate fibrosis (F2). Unknown if this is an improvement or worsening of fibrosis since post HCV tx b/c no fibrosis assessment done in the past as per pt. Recommended fibroscan test to re-evaluate fibrosis. \par -Was never exposed to HBV (HBsAg and HBcAb neg), will check if she needs vaccination\par \par #Elevated liver enzymes\par Her recent BW in April and May 2023 showed isolated mildly elevated ALT 50/46. Will repeat BW. Abdo US ordered to evaluate for fatty liver. \par \par Plan:\par BW today, fibroscan test, abdo US, RTC 2-3 weeks.

## 2023-07-12 NOTE — HISTORY OF PRESENT ILLNESS
[Infected Sexual Partner] : no infected sexual partner [IV Drug Use] : no IV drug use [Tattoo] : no tattoos [Body Piercing] : no body piercing [Hemodialysis] : no hemodialysis [Transfusion before 1992] : no transfusion before 1992 [Transplant before 1992] : no transplant before 1992 [Household Contact to HBV] : no household contact to HBV [Occupational Exposure] : no occupational exposure [Cocaine Use] : no cocaine use [de-identified] : 63 yoF with HTN, HLD, osteoporosis, MGUS, large vessel vasculitis, cardiomyopathy/cardiac sarcoidosis here for liver evaluation referred by her rheumatologist Dr. Neva Patino b/c of consideration in starting methotrexate for cardiac sarcoidosis. \par \par She was Dx w/HCV by her GI several years ago  during a screening colonoscopy and was treated with DAA around 2015 and achieved SVR. Her last HCVRNA in April 2023 was not detected. HCV fibrosure on 6/20/23 showed moderate fibrosis (F2). She doesn't know if any test was done prior to HCV treatment to determined fibrosis staging. Never had a liver bx. Her BW showed that she was never exposed to HBV.  Her recent BW in April and May 2023 showed isolated mildly elevated ALT 50/46. \par \par Born in [place] and moved to  1999. Works as .\par Thinks she may have contracted HCV from vaccination as a child in [place] due to unclean needles. \par No family h/o liver disease. Never use drugs. Does not drink alcohol. \par Her current weight is 153 lbs, BMI 26. She is mainly a vegan, usually healthy food choices. She has not been exercising in the last few years due to covid and more recently b/c right ankle pain from a fx. \par \par -Abdo CT 2021 showed stable 2.4 cm hepatic cyst

## 2023-07-13 LAB
A1AT SERPL-MCNC: 114 MG/DL
ALBUMIN SERPL ELPH-MCNC: 4.8 G/DL
ALP BLD-CCNC: 55 U/L
ALT SERPL-CCNC: 25 U/L
ANION GAP SERPL CALC-SCNC: 15 MMOL/L
AST SERPL-CCNC: 21 U/L
BILIRUB SERPL-MCNC: 0.6 MG/DL
BUN SERPL-MCNC: 15 MG/DL
CALCIUM SERPL-MCNC: 9.4 MG/DL
CERULOPLASMIN SERPL-MCNC: 19 MG/DL
CHLORIDE SERPL-SCNC: 104 MMOL/L
CO2 SERPL-SCNC: 24 MMOL/L
CREAT SERPL-MCNC: 0.74 MG/DL
EGFR: 91 ML/MIN/1.73M2
HBV SURFACE AB SER QL: NONREACTIVE
HEPATITIS A IGG ANTIBODY: REACTIVE
INR PPP: 1.08 RATIO
POTASSIUM SERPL-SCNC: 4.4 MMOL/L
PROT SERPL-MCNC: 6.5 G/DL
PT BLD: 12.7 SEC
SODIUM SERPL-SCNC: 142 MMOL/L

## 2023-07-14 ENCOUNTER — OUTPATIENT (OUTPATIENT)
Dept: OUTPATIENT SERVICES | Facility: HOSPITAL | Age: 63
LOS: 1 days | End: 2023-07-14
Payer: COMMERCIAL

## 2023-07-14 ENCOUNTER — APPOINTMENT (OUTPATIENT)
Dept: ULTRASOUND IMAGING | Facility: IMAGING CENTER | Age: 63
End: 2023-07-14
Payer: COMMERCIAL

## 2023-07-14 DIAGNOSIS — Z98.89 OTHER SPECIFIED POSTPROCEDURAL STATES: Chronic | ICD-10-CM

## 2023-07-14 DIAGNOSIS — G56.01 CARPAL TUNNEL SYNDROME, RIGHT UPPER LIMB: Chronic | ICD-10-CM

## 2023-07-14 DIAGNOSIS — Z79.899 OTHER LONG TERM (CURRENT) DRUG THERAPY: ICD-10-CM

## 2023-07-14 DIAGNOSIS — Z98.890 OTHER SPECIFIED POSTPROCEDURAL STATES: Chronic | ICD-10-CM

## 2023-07-14 LAB
LKM AB SER QL IF: <20.1 UNITS
MITOCHONDRIA AB SER IF-ACNC: NORMAL
SMOOTH MUSCLE AB SER QL IF: NORMAL

## 2023-07-14 PROCEDURE — 76700 US EXAM ABDOM COMPLETE: CPT

## 2023-07-14 PROCEDURE — 76700 US EXAM ABDOM COMPLETE: CPT | Mod: 26

## 2023-07-18 ENCOUNTER — NON-APPOINTMENT (OUTPATIENT)
Age: 63
End: 2023-07-18

## 2023-07-18 LAB — SOLUBLE LIVER IGG SER IA-ACNC: 0.5

## 2023-07-19 ENCOUNTER — APPOINTMENT (OUTPATIENT)
Dept: HEMATOLOGY ONCOLOGY | Facility: CLINIC | Age: 63
End: 2023-07-19
Payer: COMMERCIAL

## 2023-07-19 ENCOUNTER — RESULT REVIEW (OUTPATIENT)
Age: 63
End: 2023-07-19

## 2023-07-19 VITALS
OXYGEN SATURATION: 99 % | WEIGHT: 154.74 LBS | HEART RATE: 70 BPM | BODY MASS INDEX: 26.56 KG/M2 | SYSTOLIC BLOOD PRESSURE: 107 MMHG | RESPIRATION RATE: 16 BRPM | DIASTOLIC BLOOD PRESSURE: 65 MMHG | TEMPERATURE: 97.5 F

## 2023-07-19 DIAGNOSIS — D47.2 MONOCLONAL GAMMOPATHY: ICD-10-CM

## 2023-07-19 LAB
BASOPHILS # BLD AUTO: 0.05 K/UL — SIGNIFICANT CHANGE UP (ref 0–0.2)
BASOPHILS NFR BLD AUTO: 0.7 % — SIGNIFICANT CHANGE UP (ref 0–2)
EOSINOPHIL # BLD AUTO: 0.29 K/UL — SIGNIFICANT CHANGE UP (ref 0–0.5)
EOSINOPHIL NFR BLD AUTO: 4.2 % — SIGNIFICANT CHANGE UP (ref 0–6)
HCT VFR BLD CALC: 39.8 % — SIGNIFICANT CHANGE UP (ref 34.5–45)
HGB BLD-MCNC: 14 G/DL — SIGNIFICANT CHANGE UP (ref 11.5–15.5)
IMM GRANULOCYTES NFR BLD AUTO: 0.1 % — SIGNIFICANT CHANGE UP (ref 0–0.9)
LYMPHOCYTES # BLD AUTO: 1.69 K/UL — SIGNIFICANT CHANGE UP (ref 1–3.3)
LYMPHOCYTES # BLD AUTO: 24.4 % — SIGNIFICANT CHANGE UP (ref 13–44)
MCHC RBC-ENTMCNC: 32 PG — SIGNIFICANT CHANGE UP (ref 27–34)
MCHC RBC-ENTMCNC: 35.2 G/DL — SIGNIFICANT CHANGE UP (ref 32–36)
MCV RBC AUTO: 91.1 FL — SIGNIFICANT CHANGE UP (ref 80–100)
MONOCYTES # BLD AUTO: 0.59 K/UL — SIGNIFICANT CHANGE UP (ref 0–0.9)
MONOCYTES NFR BLD AUTO: 8.5 % — SIGNIFICANT CHANGE UP (ref 2–14)
NEUTROPHILS # BLD AUTO: 4.31 K/UL — SIGNIFICANT CHANGE UP (ref 1.8–7.4)
NEUTROPHILS NFR BLD AUTO: 62.1 % — SIGNIFICANT CHANGE UP (ref 43–77)
NRBC # BLD: 0 /100 WBCS — SIGNIFICANT CHANGE UP (ref 0–0)
PLATELET # BLD AUTO: 201 K/UL — SIGNIFICANT CHANGE UP (ref 150–400)
RBC # BLD: 4.37 M/UL — SIGNIFICANT CHANGE UP (ref 3.8–5.2)
RBC # FLD: 12.1 % — SIGNIFICANT CHANGE UP (ref 10.3–14.5)
WBC # BLD: 6.94 K/UL — SIGNIFICANT CHANGE UP (ref 3.8–10.5)
WBC # FLD AUTO: 6.94 K/UL — SIGNIFICANT CHANGE UP (ref 3.8–10.5)

## 2023-07-19 PROCEDURE — 99213 OFFICE O/P EST LOW 20 MIN: CPT

## 2023-07-21 ENCOUNTER — APPOINTMENT (OUTPATIENT)
Dept: HEPATOLOGY | Facility: CLINIC | Age: 63
End: 2023-07-21
Payer: COMMERCIAL

## 2023-07-21 ENCOUNTER — APPOINTMENT (OUTPATIENT)
Dept: HEART FAILURE | Facility: CLINIC | Age: 63
End: 2023-07-21

## 2023-07-21 PROCEDURE — 76981 USE PARENCHYMA: CPT

## 2023-07-25 ENCOUNTER — APPOINTMENT (OUTPATIENT)
Dept: RHEUMATOLOGY | Facility: CLINIC | Age: 63
End: 2023-07-25
Payer: COMMERCIAL

## 2023-07-25 VITALS
RESPIRATION RATE: 16 BRPM | TEMPERATURE: 97.1 F | HEART RATE: 90 BPM | OXYGEN SATURATION: 98 % | BODY MASS INDEX: 26.29 KG/M2 | DIASTOLIC BLOOD PRESSURE: 80 MMHG | WEIGHT: 154 LBS | HEIGHT: 64 IN | SYSTOLIC BLOOD PRESSURE: 134 MMHG

## 2023-07-25 DIAGNOSIS — R21 RASH AND OTHER NONSPECIFIC SKIN ERUPTION: ICD-10-CM

## 2023-07-25 PROCEDURE — 99214 OFFICE O/P EST MOD 30 MIN: CPT

## 2023-07-25 NOTE — HISTORY OF PRESENT ILLNESS
[Disease:__________________________] : Disease: [unfilled] [Treatment Protocol] : Treatment Protocol [de-identified] : 60 yo post-menopausal female with a history of Hep C (s/p curative therapy), vasculitis / aortitis (active), migraines and MGUS (dx 2/2021) here to establish care with a new hematologist. She was previously seen by Dr Tobias Bocanegra who diagnosed her with MGUS after performing a myeloma workup. 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 No increase in blasts.\par Flow: Monoclonal lambda plasma cell population that is CD38+ encompassed < 1 % of analyzed WBCs\par Cytogenetics: Normal 46,XX female karyotype\par FISH: Positive for CCND1/IGH t(11;14) -- Negative for RB1 deletion, TP53 (17p13) deletion, duplication 1q21, FGFR3/IGH t(4;14) and IGH/MAF t(14;16)\par \par Imaging:\par Skeletal Survey 2/8/21: No lytic or blastic osseous lesions\par CT Chest (w/o contrast) 2/17/21: 3 mm subpleural nodule thought to be postinflammatory or benign lymph node. No destructive lesions or other significant findings.\par \par PET-CT 1/19/22: \par 1. FDG avidity along the walls of the thoracic and abdominal aorta, with activity extending into the right brachiocephalic and bilateral subclavian arteries, and bilateral iliac and femoral vessels. This is concerning for vasculitis. \par 2. Cystic lesions in bilateral breasts with associated FDG avid soft tissue in the anterior posterior aspects.\par 3. No abnormal FDG activity in bones\par \par Social Hx:\par , lives in [place], works as an , born in [place]\par Current Smoker \par No significant Alcohol use\par \par Allergies:\par NKDA\par \par ========================================================\par Care Providers\par \par Prior Hematologist / Oncologist: Dr Tobias Damon ([facility])\par Rheumatologist: Dr Сергей Patino\par PMD: Dr. Rolly Beard: [phone]\par \par ======================================================== [de-identified] : N/A [de-identified] : See above [de-identified] : Positive for t(11;14) which is favorable in MGUG/Myeloma [de-identified] : 2/9/22: Initial Visit. Ms Rigoberto presents today to establish care with a new hematology team connected to [facility]. Today she complains of severe neck, right ankle, leg/back pain limiting her ability to work and stand for long periods of time. She has had a spinal workup in the past with multiple herniated discs, and also has had steroid injections without significant improvement as of recent (last injection on Alhaji 3, 2022). She has had severe pain in her right ankle for many weeks now and she feels it may radiate from her back or leg. She denied any numbness. This has significantly affected her ability to function both at home and at work and descending altitude on the plane makes the pain unbearable. Recent PET-CT showed recurrent aortitis in Jan 2022. She denies any recent fevers, chills, night sweats, significant weight loss. She also reports that she was recently told that she has smoldering myeloma, which at this time does not appear to be true.\par \par 5/11/22: Follow- up. She is getting treated for her vasculitis with Dr Сергей Patino with no recent flare ups. She is now off steroids. She still has been in severe pain in her right ankle (uses a brace now), leg/back pain. Cataract surgery in left eye planned on 5/20/22. She is getting a mammogram and ultrasound on 5/31/22. Denies swelling in ankles even when flying. She bruises easily. Recently had a left tooth abscess that caused facial swelling 3 weeks ago.  She denies any recent fevers, chills, night sweats, significant weight loss. No new lumps or bumps. \par \par 8/24/22: Follow-up. Her PCP is concerned about her HTN. Her BP is slightly elevated today in 150/80s range. We checked her home BP device and it is higher than our measurements, she was advised to check also when she sees her PCP. She has been on metoprolol 50 mg for 2 months and losartan 50 mg for last one month with some very mild improvement. She is still working as a  for [employer], her pain is much better controlled and she is able to work now without limitations. Overall she has had a significant improvement since starting Tocilizumab and trigger point injections in her back and right ankle. Compression stockings also help. A few months ago and was hospitalized for abscess, received abx, with subsequent yeast infection, however he has otherwise not experienced any other recent infections.. Patient still smokes cigarettes. No new noticeable masses No trouble swallowing,issues with bowel or urinary function, changes in appetite. \par \par 1/18/23: Follow-up. Ms Rigoberto has had significant improvement in her foot and back pain. She continues to work as a . Her vasculitis has also been under better control, and she is now on less frequent dosing of tocilizumab. She has not experienced any recent infections. She denies constitutional complaints.\par \par 7/19/23: Follow-up. Ms feels like she is developing a URI. She has been having sinus congestion and her ears have been popping when flying. She also has issues walking for long periods of time. She continues to work. She recently travelled to [place] for a vacation and was not too restricted. While she she was there she developed a whole body rash after swimming in a pool, most of the rash has since improved over the last 2 weeks, however there is some residual rash on her shins.\par \par ___________\par A comprehensive review of systems was performed including constitutional, eyes, ENT, cardiovascular, respiratory, gastrointestinal, genitourinary, musculoskeletal, integumentary, neurological, psychiatric and hematologic / lymphatic. All pertinent positives are included in the H&P under interval history above and the remaining review of systems listed are negative.  [FreeTextEntry1] : Observation

## 2023-07-25 NOTE — PHYSICAL EXAM
[General Appearance - Alert] : alert [General Appearance - In No Acute Distress] : in no acute distress [Auscultation Breath Sounds / Voice Sounds] : lungs were clear to auscultation bilaterally [Heart Sounds] : normal S1 and S2 [Musculoskeletal - Swelling] : no joint swelling seen [FreeTextEntry1] : Faint erythematous lesions over lower extremities [Impaired Insight] : insight and judgment were intact

## 2023-07-25 NOTE — ASSESSMENT
[FreeTextEntry1] : 1- Large Vessel Vasculitis \par Aortitis with wall thickening of TA Ao and bilateral LORI seen on CT A/P since 2/2021 \par IgG4 levels normal ,ALDAIR, ANCA and all serology negative, elevated ESR, CRP\par \par *PET CT 1/19/22: FDG avidity along the walls of the thoracic and abdominal aorta with activity extending into the right BC, bilateral SC arteries, bilateral iliac and femoral vessels.\par **Started TCZ weekly on 2/25/22 \par - PET CT Nov 2022 : almost resolution of FDG activity in the walls of the thoracic and abdominal aorta and its branches suggesting response to therapy\par -switched to TCZ q 2 weeks dosing since December 2022, has been off Actemra for the past month\par \par \par 2-High risk medication use:\par -Recent monitoring blood work completed with hematology reviewed today\par -Lipid monitoring with PMD/cardiology\par \par \par 3- Cardiomyopathy, 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 Esquivel\par \par -patient has no overt extra cardiac manifestations of sarcoidosis (seen on PET or historically)\par -discussed that aortitis is very rare in sarcoidosis and it is possible that she has two separate issues \par -ACE normal, Vitamin D 1, 25 normal, no hyper Ca\par - opthalmology evaluation for screening\par -s.p mobile cardiac telemetry: no arrhythmias \par -discussed the option of switching to MTX or TNFi\par discussed with patient and Dr Esquivel at length \par given her work and infectious risk, joint decision made to proceed with MTX first \par risks/benefits/alternatives/side effects reviewed with patient at length \par given history of HCV, will check FibroSURE and refer to hepatology/ fibroscan as baseline\par \par [] I reach out to hepatology today to see if they have the results of the FibroScan and their input regarding starting methotrexate\par If cleared by hepatology, patient to start methotrexate and will get monitoring blood work in 2 weeks\par \par \par \par 4-osteoporosis\par DEXA 2/16/2022: T score L-spine -2.7\par Started Alendronate March 2023, tolerating well \par \par 5- Vaccination status/immunosuppressed status: \par COVID vaccine completed , booster :11/30/21 - second booster received 5/4/22, third booster 11/20/22\par PCV 13 -received\par PPSV 23 administered 2/10/22\par Shingrix-first dose 2/25-**Second dose administered 6/18/22\par \par 6- MGUS, following with hematologist \par [] I reach out to hematologist today to make sure no contraindication to transition her to methotrexate\par \par 7- History of positive QTB \par completed a course of rifampin from 09/2021- 1/1/22\par \par 8-History of HCV treatment>6 years ago ~2015- HCV RNA undetectable \par s.p hepatology evaluation\par \par 10- developed an erythematous generalized body rash after being admitted foam party in the sun\par Overall resolved with antihistamines and topical steroids, remnant rash over lower extremities\par [] Dermatology referral provided today\par \par Follow-up after above\par

## 2023-07-25 NOTE — ASSESSMENT
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 \par \par She has no CRAB signs or symptoms and her most recent PET-CT imaging was in Jan 2022. Her disease consists mostly of elevated lambda light chain with an involved vs uninvolved ratio that remains < 100, which has improved slightly since her last visit. She does have a weak M-spike that is not quantifiable on recent blood work. Her last BM biopsy in 2/2021 showed 10% monotypic plasma cells which puts her on the border of MGUS/Smoldering Myeloma, both of which should be monitored and not treated with myeloma-directed therapies (pathology not verified here as of yet). At this time she does not meet revised IMWG criteria for multiple myeloma and remains on observation. \par \par She underwent a left posterior maxillary hard tissue biopsy on 4/6/22 that showed osteitis. She was treated for an oral abscess. She has not experienced any other infectious complaints since. She remains on Tocilizumab for vasculitis, which on recent PET-CT showed marked improvement. The Toci has contributed to elevated blood pressures, which is now improved on amlodipine (3 antihypertensives now). She had been on tocilizumab from Dec 2022 to June 2023. Now under consideration for other DMARD.\par \par \par Plan:\par - Repeat basic MGUS / smoldering myeloma labs with serum immunoelectrophoresis\par - Monitor serum immunoelectrophoresis every 3 months as long as stable\par - Hold off additional BM biopsies at this time, can repeat if significant changes in immunoelectrophoresis studies or CBC\par - Rheum Dr Сергей Patino for management of vasculitis/ sarcoidosis, completed last dose of Tocilizumab in June 2023, plan to start Humira +/- methotrexate pending liver tests. Cleared from heme standpoint for methotrexate and immunologics.\par - Cardiology: Had seen Dr Esquivel who was monitoring her for cardiac amyloidosis\par - Osteoporosis: On alendronate\par - HTN: follow-up PMD as needed, now on triple therapy\par - Current smoker: Has tried multiple smoking cessation aids without success, discussed cessation options again today\par - Continue every 3 months\par \par _____\par I personally have spent a total of 25 minutes of time on the date of this encounter reviewing test results, documenting findings, providing education, coordinating care and directly consulting with the patient and/or designated family member.

## 2023-07-25 NOTE — HISTORY OF PRESENT ILLNESS
[FreeTextEntry1] : #Interval events:\par -During her trip to [place], she attended a foam party and shortly after developed generalized body rash\par Went to an urgent care and was given antihistamines and topical creams\par Rash resolved but she has some remnant lesions over lower extremities\par -She also feels a little bit congested she attributes this to the change of temperature during her flights \par Otherwise no fevers or chills, no worsening shortness of breath, no chest pain\par -No joint pain or swelling\par -Completed initial evaluation by hepatology, awaiting to get results\par -Has not taken Actemra for the past month\par \par \par \par ==========================\par \par \par 1. Large vessel vasculitis \par aortitis on imaging March 2021 \par IgG4 wnl , ALDAIR, ANCA negative, elevated ESR and CRP (on steroid) \par CT angio neck and chest May 2021: neg \par CT Abd/Pelvis showed wall thickening of thoracic and abd aorta, consistent with large vessel vasculitis \par PET scan - Jan 2022 - activities in thoracic, abd aorta and common iliac vessels \par Actemra started Feb 2022 \par \par 2. History of +HCV, status post treatment>6 years ago ~2015 and cured)\par  HCV RNA undetectable \par \par 3.MGUS \par \par 4. positive quantiferon\par  started INH and B6 5/3/2021, stopped in July 2021 due to transaminitis \par completed a course of rifampin from 09/2021- 1/1/22. \par \par \par Medications:\par Losartan 50 mg od\par Metoprolol 100 mg od\par Amlodipine 2.5 mg od\par Actemra s.c discontinued\par \par

## 2023-07-26 ENCOUNTER — TRANSCRIPTION ENCOUNTER (OUTPATIENT)
Age: 63
End: 2023-07-26

## 2023-07-26 RX ORDER — TOCILIZUMAB 180 MG/ML
162 INJECTION, SOLUTION SUBCUTANEOUS
Qty: 4 | Refills: 3 | Status: COMPLETED | COMMUNITY
Start: 2022-02-25 | End: 2023-07-26

## 2023-07-27 LAB
ALBUMIN MFR SERPL ELPH: 65 %
ALBUMIN SERPL ELPH-MCNC: 4.7 G/DL
ALBUMIN SERPL-MCNC: 4.1 G/DL
ALBUMIN/GLOB SERPL: 1.9 RATIO
ALP BLD-CCNC: 56 U/L
ALPHA1 GLOB MFR SERPL ELPH: 4.3 %
ALPHA1 GLOB SERPL ELPH-MCNC: 0.3 G/DL
ALPHA2 GLOB MFR SERPL ELPH: 14.4 %
ALPHA2 GLOB SERPL ELPH-MCNC: 0.9 G/DL
ALT SERPL-CCNC: 22 U/L
ANION GAP SERPL CALC-SCNC: 11 MMOL/L
AST SERPL-CCNC: 25 U/L
B-GLOBULIN MFR SERPL ELPH: 9.9 %
B-GLOBULIN SERPL ELPH-MCNC: 0.6 G/DL
B2 MICROGLOB SERPL-MCNC: 2.8 MG/L
BILIRUB SERPL-MCNC: 0.5 MG/DL
BUN SERPL-MCNC: 10 MG/DL
CALCIUM SERPL-MCNC: 9.6 MG/DL
CHLORIDE SERPL-SCNC: 107 MMOL/L
CO2 SERPL-SCNC: 25 MMOL/L
CREAT SERPL-MCNC: 0.62 MG/DL
DEPRECATED KAPPA LC FREE/LAMBDA SER: 0.08 RATIO
EGFR: 100 ML/MIN/1.73M2
GAMMA GLOB FLD ELPH-MCNC: 0.4 G/DL
GAMMA GLOB MFR SERPL ELPH: 6.4 %
GLUCOSE SERPL-MCNC: 104 MG/DL
IGA SER QL IEP: 62 MG/DL
IGG SER QL IEP: 386 MG/DL
IGM SER QL IEP: 20 MG/DL
INTERPRETATION SERPL IEP-IMP: NORMAL
KAPPA LC CSF-MCNC: 13.79 MG/DL
KAPPA LC SERPL-MCNC: 1.15 MG/DL
M PROTEIN SPEC IFE-MCNC: NORMAL
POTASSIUM SERPL-SCNC: 4.7 MMOL/L
PROT SERPL-MCNC: 6.3 G/DL
PROT SERPL-MCNC: 6.3 G/DL
PROT SERPL-MCNC: 6.4 G/DL
SODIUM SERPL-SCNC: 143 MMOL/L

## 2023-08-01 ENCOUNTER — APPOINTMENT (OUTPATIENT)
Dept: DERMATOLOGY | Facility: CLINIC | Age: 63
End: 2023-08-01
Payer: COMMERCIAL

## 2023-08-01 DIAGNOSIS — L81.0 POSTINFLAMMATORY HYPERPIGMENTATION: ICD-10-CM

## 2023-08-01 DIAGNOSIS — L30.9 DERMATITIS, UNSPECIFIED: ICD-10-CM

## 2023-08-01 DIAGNOSIS — L85.3 XEROSIS CUTIS: ICD-10-CM

## 2023-08-01 PROCEDURE — 99204 OFFICE O/P NEW MOD 45 MIN: CPT

## 2023-08-01 RX ORDER — HYDROQUINONE 40 MG/G
4 CREAM TOPICAL
Qty: 1 | Refills: 1 | Status: ACTIVE | COMMUNITY
Start: 2023-08-01 | End: 1900-01-01

## 2023-08-01 NOTE — PHYSICAL EXAM
[No Chromhidrosis] : no chromhidrosis [FreeTextEntry3] : General: well appearing person in nad, alert, pleasant Focused Skin Exam per patient preference: hyperpigmented macules and patches on bl lower legs mild xerosis

## 2023-08-01 NOTE — ASSESSMENT
[FreeTextEntry1] : # Dermatitis, legs new diagnosis with uncertain prognosis Favor PIH from prior ACD/rash - however, as slight yellow hue could be reminiscent of early NLD but unlikely as no atrophic plaques - we have discussed the nature and course of this condition, treatment options and expectations. - can stop halobetasol 0.05% cream from pcp - pt counseled on importance of sun protection, SPF30 or higher - discussed can just do sunprotection/time. If would like to hasten resolution of dark marks, could start Hydroquinone 4% cream at night only dark marks on leg for 3 months max, then stop. SED including irritation, risk of exogenous ochronosis, and lightening more or less than intended - GoodRx coupon provided - gentle skin care - rtc if changing/worsening or becomes symptomatic  #Xerosis - Principles of dry skin care reviewed including: importance of using an emollient 2-3x/day, using gentle products, and avoiding fragranced products including soaps and detergent  RTC 3-6 months or sooner prn (if does not resolve)

## 2023-08-01 NOTE — HISTORY OF PRESENT ILLNESS
[FreeTextEntry1] : npa- dark spots [de-identified] : Pt is a 63 year old F presenting for  1. Dark spots on lower legs x 1 mo. Pt was at a pool party 1 mo ago and developed a rash on arms and legs - she thinks it is a reaction to the foam in the pool. Pt saw and UC and got prednisone x 5 days and Halobetasol cream. The rash has resolved, but it has left new dark marks on her legs.

## 2023-08-07 ENCOUNTER — TRANSCRIPTION ENCOUNTER (OUTPATIENT)
Age: 63
End: 2023-08-07

## 2023-08-08 ENCOUNTER — TRANSCRIPTION ENCOUNTER (OUTPATIENT)
Age: 63
End: 2023-08-08

## 2023-08-09 ENCOUNTER — TRANSCRIPTION ENCOUNTER (OUTPATIENT)
Age: 63
End: 2023-08-09

## 2023-08-11 ENCOUNTER — APPOINTMENT (OUTPATIENT)
Dept: HEPATOLOGY | Facility: CLINIC | Age: 63
End: 2023-08-11

## 2023-08-13 ENCOUNTER — TRANSCRIPTION ENCOUNTER (OUTPATIENT)
Age: 63
End: 2023-08-13

## 2023-08-21 ENCOUNTER — RX RENEWAL (OUTPATIENT)
Age: 63
End: 2023-08-21

## 2023-08-29 ENCOUNTER — APPOINTMENT (OUTPATIENT)
Dept: RHEUMATOLOGY | Facility: CLINIC | Age: 63
End: 2023-08-29
Payer: COMMERCIAL

## 2023-08-29 VITALS
BODY MASS INDEX: 25.95 KG/M2 | HEART RATE: 78 BPM | OXYGEN SATURATION: 98 % | TEMPERATURE: 97.1 F | SYSTOLIC BLOOD PRESSURE: 147 MMHG | HEIGHT: 64 IN | DIASTOLIC BLOOD PRESSURE: 86 MMHG | WEIGHT: 152 LBS | RESPIRATION RATE: 16 BRPM

## 2023-08-29 PROCEDURE — 99215 OFFICE O/P EST HI 40 MIN: CPT

## 2023-08-29 NOTE — HISTORY OF PRESENT ILLNESS
[de-identified] : since last visit, 7/25/23 [FreeTextEntry1] : has taken MTX 4 tabs/week 3 times, tolerating well so far feels well overall, only issue is that she feels short of breath when bending down, does not happen frquently

## 2023-08-29 NOTE — PHYSICAL EXAM
[General Appearance - Alert] : alert [General Appearance - In No Acute Distress] : in no acute distress [Impaired Insight] : insight and judgment were intact [Auscultation Breath Sounds / Voice Sounds] : lungs were clear to auscultation bilaterally [Heart Sounds] : normal S1 and S2 [Musculoskeletal - Swelling] : no joint swelling seen

## 2023-08-29 NOTE — ASSESSMENT
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 Started TCZ weekly on 2/25/22 PET CT Nov 2022 : almost resolution of FDG activity in the walls of the thoracic and abdominal aorta and its branches suggesting response to therapy switched to TCZ q 2 weeks dosing since December 2022, has been off Actemra since June 2023    2- Cardiomyopathy, possible sarcoid CM  Cardiac PET reviewed and discussed with radiologist: Focal FDG uptake at the left ventricular apex with associated small perfusion defect, corresponding to myocardial wall thickening and late gadolinium enhancement seen on MRI, favor cardiac sarcoidosis No FDG avid mediastinal or hilar lymphadenopathy or pulmonary nodules Patient has a longstanding history of arrhythmia and palpitation, status post ablation many years ago -patient has no overt extra cardiac manifestations of sarcoidosis (seen on PET or historically) -discussed that aortitis is very rare in sarcoidosis and it is possible that she has two separate issues -ACE normal, Vitamin D 1, 25 normal, no hyper Ca  - ophthalmology evaluation for screening -s.p mobile cardiac telemetry: no arrhythmias -discussed the option of switching to MTX or TNFi discussed with patient and Dr Esquivel at length given her work and infectious risk, joint decision made to proceed with MTX first risks/benefits/alternatives/side effects reviewed with patient at length, she is agreeable  given history of HCV, s.p hepatology eval  discussed with hepatology, no CI to starting MTX obtain monitoring blood work today, if normal to increase to 6 tabs/week   3-High risk medication use:  -Obtain monitoring blood work today      4-osteoporosis  DEXA 2/16/2022: T score L-spine -2.7 Started Alendronate March 2023, tolerating well    5- Vaccination status/immunosuppressed status:  COVID vaccine completed , booster :11/30/21 - second booster received 5/4/22, third booster 11/20/22 PCV 13 -received PPSV 23 administered 2/10/22 Shingrix-first dose 2/25-**Second dose administered 6/18/22    6- MGUS, following with hematologist  [] I reach out to hematologist today to make sure no contraindication to transition her to methotrexate   7- History of positive QTB  completed a course of rifampin from 09/2021- 1/1/22    8-History of HCV treatment>6 years ago ~2015- HCV RNA undetectable s.p hepatology evaluation    repeat labs in 2 weeks  RTO In 4-5 weeks

## 2023-08-30 ENCOUNTER — TRANSCRIPTION ENCOUNTER (OUTPATIENT)
Age: 63
End: 2023-08-30

## 2023-08-30 LAB
ALBUMIN SERPL ELPH-MCNC: 4.7 G/DL
ALP BLD-CCNC: 60 U/L
ALT SERPL-CCNC: 25 U/L
ANION GAP SERPL CALC-SCNC: 12 MMOL/L
AST SERPL-CCNC: 17 U/L
BASOPHILS # BLD AUTO: 0.03 K/UL
BASOPHILS NFR BLD AUTO: 0.5 %
BILIRUB SERPL-MCNC: 0.5 MG/DL
BUN SERPL-MCNC: 13 MG/DL
CALCIUM SERPL-MCNC: 9.4 MG/DL
CHLORIDE SERPL-SCNC: 107 MMOL/L
CO2 SERPL-SCNC: 25 MMOL/L
CREAT SERPL-MCNC: 0.64 MG/DL
CRP SERPL-MCNC: <3 MG/L
EGFR: 99 ML/MIN/1.73M2
EOSINOPHIL # BLD AUTO: 0.13 K/UL
EOSINOPHIL NFR BLD AUTO: 2 %
ERYTHROCYTE [SEDIMENTATION RATE] IN BLOOD BY WESTERGREN METHOD: 4 MM/HR
GLUCOSE SERPL-MCNC: 106 MG/DL
HCT VFR BLD CALC: 40.3 %
HGB BLD-MCNC: 13 G/DL
IMM GRANULOCYTES NFR BLD AUTO: 0.2 %
LYMPHOCYTES # BLD AUTO: 1.35 K/UL
LYMPHOCYTES NFR BLD AUTO: 20.8 %
MAN DIFF?: NORMAL
MCHC RBC-ENTMCNC: 31.1 PG
MCHC RBC-ENTMCNC: 32.3 GM/DL
MCV RBC AUTO: 96.4 FL
MONOCYTES # BLD AUTO: 0.47 K/UL
MONOCYTES NFR BLD AUTO: 7.2 %
NEUTROPHILS # BLD AUTO: 4.5 K/UL
NEUTROPHILS NFR BLD AUTO: 69.3 %
PLATELET # BLD AUTO: 255 K/UL
POTASSIUM SERPL-SCNC: 4.4 MMOL/L
PROT SERPL-MCNC: 6.6 G/DL
RBC # BLD: 4.18 M/UL
RBC # FLD: 13.1 %
SODIUM SERPL-SCNC: 144 MMOL/L
WBC # FLD AUTO: 6.49 K/UL

## 2023-09-01 ENCOUNTER — TRANSCRIPTION ENCOUNTER (OUTPATIENT)
Age: 63
End: 2023-09-01

## 2023-09-05 ENCOUNTER — TRANSCRIPTION ENCOUNTER (OUTPATIENT)
Age: 63
End: 2023-09-05

## 2023-09-05 ENCOUNTER — APPOINTMENT (OUTPATIENT)
Dept: RHEUMATOLOGY | Facility: CLINIC | Age: 63
End: 2023-09-05
Payer: COMMERCIAL

## 2023-09-05 VITALS
RESPIRATION RATE: 16 BRPM | OXYGEN SATURATION: 98 % | DIASTOLIC BLOOD PRESSURE: 85 MMHG | BODY MASS INDEX: 25.95 KG/M2 | HEART RATE: 80 BPM | SYSTOLIC BLOOD PRESSURE: 151 MMHG | TEMPERATURE: 97.1 F | WEIGHT: 152 LBS | HEIGHT: 64 IN

## 2023-09-05 LAB
ALBUMIN SERPL ELPH-MCNC: 4.5 G/DL
ALP BLD-CCNC: 58 U/L
ALT SERPL-CCNC: 15 U/L
ANION GAP SERPL CALC-SCNC: 12 MMOL/L
AST SERPL-CCNC: 16 U/L
BASOPHILS # BLD AUTO: 0.03 K/UL
BASOPHILS NFR BLD AUTO: 0.6 %
BILIRUB SERPL-MCNC: 0.4 MG/DL
BUN SERPL-MCNC: 13 MG/DL
CALCIUM SERPL-MCNC: 9.2 MG/DL
CHLORIDE SERPL-SCNC: 108 MMOL/L
CO2 SERPL-SCNC: 24 MMOL/L
CREAT SERPL-MCNC: 0.64 MG/DL
CRP SERPL-MCNC: <3 MG/L
EGFR: 99 ML/MIN/1.73M2
EOSINOPHIL # BLD AUTO: 0.12 K/UL
EOSINOPHIL NFR BLD AUTO: 2.3 %
ERYTHROCYTE [SEDIMENTATION RATE] IN BLOOD BY WESTERGREN METHOD: 7 MM/HR
HCT VFR BLD CALC: 39.9 %
HGB BLD-MCNC: 13.5 G/DL
IMM GRANULOCYTES NFR BLD AUTO: 0.2 %
LYMPHOCYTES # BLD AUTO: 1.47 K/UL
LYMPHOCYTES NFR BLD AUTO: 27.9 %
MAN DIFF?: NORMAL
MCHC RBC-ENTMCNC: 31.9 PG
MCHC RBC-ENTMCNC: 33.8 GM/DL
MCV RBC AUTO: 94.3 FL
MONOCYTES # BLD AUTO: 0.42 K/UL
MONOCYTES NFR BLD AUTO: 8 %
NEUTROPHILS # BLD AUTO: 3.22 K/UL
NEUTROPHILS NFR BLD AUTO: 61 %
PLATELET # BLD AUTO: 253 K/UL
POTASSIUM SERPL-SCNC: 4.5 MMOL/L
PROT SERPL-MCNC: 6.6 G/DL
RBC # BLD: 4.23 M/UL
RBC # FLD: 13 %
SODIUM SERPL-SCNC: 144 MMOL/L
WBC # FLD AUTO: 5.27 K/UL

## 2023-09-05 PROCEDURE — 99214 OFFICE O/P EST MOD 30 MIN: CPT

## 2023-09-05 NOTE — HISTORY OF PRESENT ILLNESS
[FreeTextEntry1] : Acute visit today Patient was working as a  over the weekend, on the flight back, she had an acute episode of severe chest pain, similar to her initial presentation of the aortitis Associated with shortness of breath, found to have very high blood pressure, was given nitroglycerin and by the time the flight landed, she was feeling much better, was evaluated by ENT upon landing, blood pressure remained high but improved, EKG reported as normal Prior to this episode, she had reported infrequent instances of mild shortness of breath when bending forward At this time, she has taken 4 doses of methotrexate 10 mg/weekly

## 2023-09-05 NOTE — ASSESSMENT
[FreeTextEntry1] : Acute visit today Patient was working as a  over the weekend, on the flight back, she had an acute episode of severe chest pain, similar to her initial presentation of the aortitis Associated with shortness of breath, found to have very high blood pressure, was given nitroglycerin and by the time the flight landed, she was feeling much better, was evaluated by ENT upon landing, blood pressure remained high but improved, EKG reported as normal Prior to this episode, she had reported infrequent instances of mild shortness of breath when bending forward At this time, she has taken 4 doses of methotrexate 10 mg/weekly Last taken Actemra around 2 months ago -We discussed the possibility of her aortitis flare given that she has been off Actemra and has not reached the optimal dose of methotrexate yet We would like to establish if she needs to remain on Actemra and add methotrexate versus methotrexate monotherapy We will obtain monitoring blood work today, as well as disease activity markers We will obtain an MRA to evaluate active aortitis/relapse I reach out to cardiologist Dr. Esquivel to inform him about this event  Follow-up after above

## 2023-09-06 ENCOUNTER — TRANSCRIPTION ENCOUNTER (OUTPATIENT)
Age: 63
End: 2023-09-06

## 2023-09-07 ENCOUNTER — TRANSCRIPTION ENCOUNTER (OUTPATIENT)
Age: 63
End: 2023-09-07

## 2023-09-07 LAB — IL6 SERPL-MCNC: 4.4 PG/ML

## 2023-09-08 ENCOUNTER — TRANSCRIPTION ENCOUNTER (OUTPATIENT)
Age: 63
End: 2023-09-08

## 2023-09-14 ENCOUNTER — APPOINTMENT (OUTPATIENT)
Dept: MRI IMAGING | Facility: CLINIC | Age: 63
End: 2023-09-14
Payer: COMMERCIAL

## 2023-09-14 ENCOUNTER — TRANSCRIPTION ENCOUNTER (OUTPATIENT)
Age: 63
End: 2023-09-14

## 2023-09-14 PROCEDURE — 71555 MRI ANGIO CHEST W OR W/O DYE: CPT

## 2023-09-14 PROCEDURE — A9585: CPT

## 2023-09-15 ENCOUNTER — TRANSCRIPTION ENCOUNTER (OUTPATIENT)
Age: 63
End: 2023-09-15

## 2023-09-22 ENCOUNTER — TRANSCRIPTION ENCOUNTER (OUTPATIENT)
Age: 63
End: 2023-09-22

## 2023-09-25 ENCOUNTER — TRANSCRIPTION ENCOUNTER (OUTPATIENT)
Age: 63
End: 2023-09-25

## 2023-09-29 ENCOUNTER — TRANSCRIPTION ENCOUNTER (OUTPATIENT)
Age: 63
End: 2023-09-29

## 2023-09-29 ENCOUNTER — APPOINTMENT (OUTPATIENT)
Dept: HEART FAILURE | Facility: CLINIC | Age: 63
End: 2023-09-29
Payer: COMMERCIAL

## 2023-09-29 ENCOUNTER — NON-APPOINTMENT (OUTPATIENT)
Age: 63
End: 2023-09-29

## 2023-09-29 VITALS — SYSTOLIC BLOOD PRESSURE: 156 MMHG | DIASTOLIC BLOOD PRESSURE: 82 MMHG

## 2023-09-29 VITALS
HEART RATE: 80 BPM | WEIGHT: 154 LBS | TEMPERATURE: 97 F | BODY MASS INDEX: 26.29 KG/M2 | DIASTOLIC BLOOD PRESSURE: 81 MMHG | OXYGEN SATURATION: 99 % | HEIGHT: 64 IN | SYSTOLIC BLOOD PRESSURE: 144 MMHG

## 2023-09-29 VITALS — DIASTOLIC BLOOD PRESSURE: 91 MMHG | SYSTOLIC BLOOD PRESSURE: 161 MMHG

## 2023-09-29 LAB
CHOLEST SERPL-MCNC: 189 MG/DL
HDLC SERPL-MCNC: 62 MG/DL
LDLC SERPL CALC-MCNC: 105 MG/DL
NONHDLC SERPL-MCNC: 127 MG/DL
NT-PROBNP SERPL-MCNC: 1600 PG/ML
TRIGL SERPL-MCNC: 120 MG/DL

## 2023-09-29 PROCEDURE — 93000 ELECTROCARDIOGRAM COMPLETE: CPT

## 2023-09-29 PROCEDURE — 36415 COLL VENOUS BLD VENIPUNCTURE: CPT

## 2023-09-29 PROCEDURE — 99214 OFFICE O/P EST MOD 30 MIN: CPT | Mod: 25

## 2023-09-29 RX ORDER — METOPROLOL SUCCINATE 50 MG/1
50 TABLET, EXTENDED RELEASE ORAL DAILY
Qty: 90 | Refills: 3 | Status: ACTIVE | COMMUNITY
Start: 1900-01-01 | End: 1900-01-01

## 2023-09-29 RX ORDER — FOLIC ACID 1 MG/1
1 TABLET ORAL DAILY
Qty: 1 | Refills: 3 | Status: ACTIVE | COMMUNITY
Start: 2023-07-26 | End: 1900-01-01

## 2023-10-06 ENCOUNTER — APPOINTMENT (OUTPATIENT)
Dept: DERMATOLOGY | Facility: CLINIC | Age: 63
End: 2023-10-06

## 2023-10-20 ENCOUNTER — APPOINTMENT (OUTPATIENT)
Dept: WOUND CARE | Facility: CLINIC | Age: 63
End: 2023-10-20

## 2023-10-23 ENCOUNTER — APPOINTMENT (OUTPATIENT)
Dept: HEMATOLOGY ONCOLOGY | Facility: CLINIC | Age: 63
End: 2023-10-23

## 2023-10-30 ENCOUNTER — NON-APPOINTMENT (OUTPATIENT)
Age: 63
End: 2023-10-30

## 2023-10-30 ENCOUNTER — APPOINTMENT (OUTPATIENT)
Dept: CV DIAGNOSITCS | Facility: HOSPITAL | Age: 63
End: 2023-10-30
Payer: COMMERCIAL

## 2023-10-30 ENCOUNTER — OUTPATIENT (OUTPATIENT)
Dept: OUTPATIENT SERVICES | Facility: HOSPITAL | Age: 63
LOS: 1 days | End: 2023-10-30

## 2023-10-30 ENCOUNTER — APPOINTMENT (OUTPATIENT)
Dept: RHEUMATOLOGY | Facility: CLINIC | Age: 63
End: 2023-10-30
Payer: COMMERCIAL

## 2023-10-30 VITALS
OXYGEN SATURATION: 98 % | WEIGHT: 154 LBS | SYSTOLIC BLOOD PRESSURE: 147 MMHG | TEMPERATURE: 98.1 F | HEIGHT: 64 IN | BODY MASS INDEX: 26.29 KG/M2 | DIASTOLIC BLOOD PRESSURE: 89 MMHG | HEART RATE: 88 BPM | RESPIRATION RATE: 16 BRPM

## 2023-10-30 DIAGNOSIS — Z98.890 OTHER SPECIFIED POSTPROCEDURAL STATES: Chronic | ICD-10-CM

## 2023-10-30 DIAGNOSIS — I42.9 CARDIOMYOPATHY, UNSPECIFIED: ICD-10-CM

## 2023-10-30 DIAGNOSIS — G56.01 CARPAL TUNNEL SYNDROME, RIGHT UPPER LIMB: Chronic | ICD-10-CM

## 2023-10-30 DIAGNOSIS — Z98.89 OTHER SPECIFIED POSTPROCEDURAL STATES: Chronic | ICD-10-CM

## 2023-10-30 PROCEDURE — 99215 OFFICE O/P EST HI 40 MIN: CPT

## 2023-10-30 PROCEDURE — 93306 TTE W/DOPPLER COMPLETE: CPT | Mod: 26

## 2023-10-30 PROCEDURE — 93880 EXTRACRANIAL BILAT STUDY: CPT | Mod: 26

## 2023-10-30 PROCEDURE — 93930 UPPER EXTREMITY STUDY: CPT | Mod: 26

## 2023-10-31 LAB
ALBUMIN SERPL ELPH-MCNC: 4.4 G/DL
ALBUMIN SERPL ELPH-MCNC: 4.7 G/DL
ALP BLD-CCNC: 67 U/L
ALP BLD-CCNC: 73 U/L
ALT SERPL-CCNC: 24 U/L
ALT SERPL-CCNC: 24 U/L
ANION GAP SERPL CALC-SCNC: 13 MMOL/L
ANION GAP SERPL CALC-SCNC: 14 MMOL/L
AST SERPL-CCNC: 15 U/L
AST SERPL-CCNC: 17 U/L
BASOPHILS # BLD AUTO: 0.03 K/UL
BASOPHILS # BLD AUTO: 0.04 K/UL
BASOPHILS NFR BLD AUTO: 0.4 %
BASOPHILS NFR BLD AUTO: 0.6 %
BILIRUB SERPL-MCNC: 0.5 MG/DL
BILIRUB SERPL-MCNC: 0.6 MG/DL
BUN SERPL-MCNC: 11 MG/DL
BUN SERPL-MCNC: 15 MG/DL
CALCIUM SERPL-MCNC: 9.7 MG/DL
CALCIUM SERPL-MCNC: 9.8 MG/DL
CHLORIDE SERPL-SCNC: 105 MMOL/L
CHLORIDE SERPL-SCNC: 107 MMOL/L
CO2 SERPL-SCNC: 24 MMOL/L
CO2 SERPL-SCNC: 25 MMOL/L
CREAT SERPL-MCNC: 0.61 MG/DL
CREAT SERPL-MCNC: 0.63 MG/DL
EGFR: 100 ML/MIN/1.73M2
EGFR: 100 ML/MIN/1.73M2
EOSINOPHIL # BLD AUTO: 0.06 K/UL
EOSINOPHIL # BLD AUTO: 0.19 K/UL
EOSINOPHIL NFR BLD AUTO: 0.7 %
EOSINOPHIL NFR BLD AUTO: 2.7 %
ERYTHROCYTE [SEDIMENTATION RATE] IN BLOOD BY WESTERGREN METHOD: 5 MM/HR
HCT VFR BLD CALC: 38.4 %
HCT VFR BLD CALC: 40.3 %
HCV RNA FLD QL NAA+PROBE: NORMAL
HCV RNA SPEC QL PROBE+SIG AMP: NOT DETECTED
HGB BLD-MCNC: 12.6 G/DL
HGB BLD-MCNC: 13.4 G/DL
IMM GRANULOCYTES NFR BLD AUTO: 0.1 %
IMM GRANULOCYTES NFR BLD AUTO: 0.1 %
LYMPHOCYTES # BLD AUTO: 1.58 K/UL
LYMPHOCYTES # BLD AUTO: 1.58 K/UL
LYMPHOCYTES NFR BLD AUTO: 19.5 %
LYMPHOCYTES NFR BLD AUTO: 22.3 %
MAN DIFF?: NORMAL
MAN DIFF?: NORMAL
MCHC RBC-ENTMCNC: 31.5 PG
MCHC RBC-ENTMCNC: 32.1 PG
MCHC RBC-ENTMCNC: 32.8 GM/DL
MCHC RBC-ENTMCNC: 33.3 GM/DL
MCV RBC AUTO: 94.8 FL
MCV RBC AUTO: 97.7 FL
MONOCYTES # BLD AUTO: 0.46 K/UL
MONOCYTES # BLD AUTO: 0.57 K/UL
MONOCYTES NFR BLD AUTO: 5.7 %
MONOCYTES NFR BLD AUTO: 8.1 %
NEUTROPHILS # BLD AUTO: 4.69 K/UL
NEUTROPHILS # BLD AUTO: 5.95 K/UL
NEUTROPHILS NFR BLD AUTO: 66.2 %
NEUTROPHILS NFR BLD AUTO: 73.6 %
PLATELET # BLD AUTO: 284 K/UL
PLATELET # BLD AUTO: 290 K/UL
POTASSIUM SERPL-SCNC: 4.4 MMOL/L
POTASSIUM SERPL-SCNC: 4.8 MMOL/L
PROT SERPL-MCNC: 6.4 G/DL
PROT SERPL-MCNC: 6.7 G/DL
RBC # BLD: 3.93 M/UL
RBC # BLD: 4.25 M/UL
RBC # FLD: 12.8 %
RBC # FLD: 13.3 %
SODIUM SERPL-SCNC: 142 MMOL/L
SODIUM SERPL-SCNC: 145 MMOL/L
WBC # FLD AUTO: 7.08 K/UL
WBC # FLD AUTO: 8.09 K/UL

## 2023-11-01 ENCOUNTER — RX RENEWAL (OUTPATIENT)
Age: 63
End: 2023-11-01

## 2023-11-01 LAB — CRP SERPL-MCNC: <3 MG/L

## 2023-11-03 LAB — IL6 SERPL-MCNC: 4.4 PG/ML

## 2023-11-08 ENCOUNTER — NON-APPOINTMENT (OUTPATIENT)
Age: 63
End: 2023-11-08

## 2023-11-08 ENCOUNTER — APPOINTMENT (OUTPATIENT)
Dept: CARDIOLOGY | Facility: CLINIC | Age: 63
End: 2023-11-08
Payer: COMMERCIAL

## 2023-11-08 DIAGNOSIS — I49.3 VENTRICULAR PREMATURE DEPOLARIZATION: ICD-10-CM

## 2023-11-08 DIAGNOSIS — Z82.41 FAMILY HISTORY OF SUDDEN CARDIAC DEATH: ICD-10-CM

## 2023-11-08 DIAGNOSIS — Z82.49 FAMILY HISTORY OF ISCHEMIC HEART DISEASE AND OTHER DISEASES OF THE CIRCULATORY SYSTEM: ICD-10-CM

## 2023-11-08 PROCEDURE — 99204 OFFICE O/P NEW MOD 45 MIN: CPT | Mod: 95

## 2023-11-09 ENCOUNTER — NON-APPOINTMENT (OUTPATIENT)
Age: 63
End: 2023-11-09

## 2023-11-13 ENCOUNTER — APPOINTMENT (OUTPATIENT)
Dept: CARDIOLOGY | Facility: CLINIC | Age: 63
End: 2023-11-13

## 2023-11-13 ENCOUNTER — TRANSCRIPTION ENCOUNTER (OUTPATIENT)
Age: 63
End: 2023-11-13

## 2023-11-13 LAB
ALBUMIN SERPL ELPH-MCNC: 4.7 G/DL
ALP BLD-CCNC: 68 U/L
ALT SERPL-CCNC: 40 U/L
ANION GAP SERPL CALC-SCNC: 14 MMOL/L
AST SERPL-CCNC: 30 U/L
BASOPHILS # BLD AUTO: 0.05 K/UL
BASOPHILS NFR BLD AUTO: 0.7 %
BILIRUB SERPL-MCNC: 0.4 MG/DL
BUN SERPL-MCNC: 16 MG/DL
CALCIUM SERPL-MCNC: 9.2 MG/DL
CHLORIDE SERPL-SCNC: 103 MMOL/L
CO2 SERPL-SCNC: 26 MMOL/L
CREAT SERPL-MCNC: 0.61 MG/DL
EGFR: 100 ML/MIN/1.73M2
EOSINOPHIL # BLD AUTO: 0.12 K/UL
EOSINOPHIL NFR BLD AUTO: 1.7 %
HCT VFR BLD CALC: 44.2 %
HGB BLD-MCNC: 14.5 G/DL
IMM GRANULOCYTES NFR BLD AUTO: 0.1 %
LYMPHOCYTES # BLD AUTO: 2.12 K/UL
LYMPHOCYTES NFR BLD AUTO: 30.2 %
MAN DIFF?: NORMAL
MCHC RBC-ENTMCNC: 31.2 PG
MCHC RBC-ENTMCNC: 32.8 GM/DL
MCV RBC AUTO: 95.1 FL
MONOCYTES # BLD AUTO: 0.52 K/UL
MONOCYTES NFR BLD AUTO: 7.4 %
NEUTROPHILS # BLD AUTO: 4.21 K/UL
NEUTROPHILS NFR BLD AUTO: 59.9 %
PLATELET # BLD AUTO: 251 K/UL
POTASSIUM SERPL-SCNC: 4.6 MMOL/L
PROT SERPL-MCNC: 6.8 G/DL
RBC # BLD: 4.65 M/UL
RBC # FLD: 12.7 %
SODIUM SERPL-SCNC: 143 MMOL/L
WBC # FLD AUTO: 7.03 K/UL

## 2023-11-20 ENCOUNTER — TRANSCRIPTION ENCOUNTER (OUTPATIENT)
Age: 63
End: 2023-11-20

## 2023-11-29 ENCOUNTER — APPOINTMENT (OUTPATIENT)
Dept: HEART FAILURE | Facility: CLINIC | Age: 63
End: 2023-11-29

## 2023-12-04 ENCOUNTER — TRANSCRIPTION ENCOUNTER (OUTPATIENT)
Age: 63
End: 2023-12-04

## 2023-12-05 ENCOUNTER — TRANSCRIPTION ENCOUNTER (OUTPATIENT)
Age: 63
End: 2023-12-05

## 2023-12-06 ENCOUNTER — TRANSCRIPTION ENCOUNTER (OUTPATIENT)
Age: 63
End: 2023-12-06

## 2023-12-07 ENCOUNTER — TRANSCRIPTION ENCOUNTER (OUTPATIENT)
Age: 63
End: 2023-12-07

## 2023-12-07 ENCOUNTER — NON-APPOINTMENT (OUTPATIENT)
Age: 63
End: 2023-12-07

## 2023-12-07 LAB — COMBINED CARDIAC PANEL: ABNORMAL

## 2023-12-11 ENCOUNTER — TRANSCRIPTION ENCOUNTER (OUTPATIENT)
Age: 63
End: 2023-12-11

## 2023-12-12 ENCOUNTER — TRANSCRIPTION ENCOUNTER (OUTPATIENT)
Age: 63
End: 2023-12-12

## 2023-12-15 ENCOUNTER — NON-APPOINTMENT (OUTPATIENT)
Age: 63
End: 2023-12-15

## 2023-12-19 ENCOUNTER — TRANSCRIPTION ENCOUNTER (OUTPATIENT)
Age: 63
End: 2023-12-19

## 2023-12-28 DIAGNOSIS — M31.6 OTHER GIANT CELL ARTERITIS: ICD-10-CM

## 2023-12-29 ENCOUNTER — APPOINTMENT (OUTPATIENT)
Dept: RHEUMATOLOGY | Facility: CLINIC | Age: 63
End: 2023-12-29
Payer: COMMERCIAL

## 2023-12-29 VITALS
TEMPERATURE: 98.1 F | WEIGHT: 154 LBS | BODY MASS INDEX: 26.29 KG/M2 | HEIGHT: 64 IN | RESPIRATION RATE: 16 BRPM | SYSTOLIC BLOOD PRESSURE: 151 MMHG | OXYGEN SATURATION: 98 % | DIASTOLIC BLOOD PRESSURE: 83 MMHG | HEART RATE: 93 BPM

## 2023-12-29 PROCEDURE — 99215 OFFICE O/P EST HI 40 MIN: CPT

## 2023-12-29 NOTE — ASSESSMENT
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 Started TCZ weekly on 2/25/22 PET CT Nov 2022 : almost resolution of FDG activity in the walls of the thoracic and abdominal aorta and its branches suggesting response to therapy switched to TCZ q 2 weeks dosing since December 2022, has been off Actemra since June 2023 Started MTX 6 tabs/week MRA September 2023: no evidence of active inflammation, moderate narrowing of left subclavian artery  2- Cardiomyopathy, possible sarcoid CM  Cardiac PET reviewed and discussed with radiologist: Focal FDG uptake at the left ventricular apex with associated small perfusion defect, corresponding to myocardial wall thickening and late gadolinium enhancement seen on MRI, favor cardiac sarcoidosis No FDG avid mediastinal or hilar lymphadenopathy or pulmonary nodules Patient has a longstanding history of arrhythmia and palpitation, status post ablation many years ago -patient has no overt extra cardiac manifestations of sarcoidosis (seen on PET or historically) -discussed that aortitis is very rare in sarcoidosis and it is possible that she has two separate issues -ACE normal, Vitamin D 1, 25 normal, no hyper Ca  - ophthalmology evaluation for screening -s.p mobile cardiac telemetry: no arrhythmias -switched to MTX (from TCXZ) as above, based on discussions with Dr Esquivel.   3-High risk medication use:    4-osteoporosis  DEXA 2/16/2022: T score L-spine -2.7 Started Alendronate March 2023, tolerating well    5- Vaccination status/immunosuppressed status:  COVID vaccine completed , booster :11/30/21 - second booster received 5/4/22, third booster 11/20/22 PCV 13 -received PPSV 23 administered 2/10/22 Shingrix-first dose 2/25-**Second dose administered 6/18/22 Flu vaccine received 2023    6- MGUS, following with hematologist    7- History of positive QTB  completed a course of rifampin from 09/2021- 1/1/22    8-History of HCV treatment>6 years ago ~2015- HCV RNA undetectable s.p hepatology evaluation cleared to start MTX   8- New onset of distorted vision, unclear if related to retina issue coincided with her increasing the MTX to 20 mg/week, although she was reassured by the ophthalmologist that it's unrelated, she is not comfortable with being on 20 mg/week   Plan: [] obtain monitoring blood work today [] check ESR and CRP today [] HCV RNA at next visit  [] continue with MTX 6 tabs/week [see above ]  [] continue daily folic acid [] plan to repeat PET in Feb  [] pending genetic evaluation results [ I reached out via email]   RTO in 4- 6 weeks.

## 2023-12-29 NOTE — HISTORY OF PRESENT ILLNESS
[de-identified] : interval events [FreeTextEntry1] : had new onset of blurry and distorted vision, was evaluated by her ophthalmologist and retina specialist, was told not related to the increase in dose of the MTX, no signs of inflammatory changes and was told to wear her glasses she continues to have the same symptoms and is highly concerned, having difficulty driving she saw another ophthalmologist in [place], was told that her retina is abnormal  scheduled to see a retina specialist on 1/10/24 in [place]  no headaches, no jaw claudication no chest pain or SOB no other complaints

## 2024-01-01 LAB
ALBUMIN SERPL ELPH-MCNC: 4.7 G/DL
ALP BLD-CCNC: 69 U/L
ALT SERPL-CCNC: 40 U/L
ANION GAP SERPL CALC-SCNC: 14 MMOL/L
AST SERPL-CCNC: 30 U/L
BASOPHILS # BLD AUTO: 0.03 K/UL
BASOPHILS NFR BLD AUTO: 0.4 %
BILIRUB SERPL-MCNC: 0.5 MG/DL
BUN SERPL-MCNC: 15 MG/DL
CALCIUM SERPL-MCNC: 9.6 MG/DL
CHLORIDE SERPL-SCNC: 105 MMOL/L
CO2 SERPL-SCNC: 23 MMOL/L
CREAT SERPL-MCNC: 0.64 MG/DL
CRP SERPL-MCNC: <3 MG/L
EGFR: 99 ML/MIN/1.73M2
EOSINOPHIL # BLD AUTO: 0.09 K/UL
EOSINOPHIL NFR BLD AUTO: 1.3 %
ERYTHROCYTE [SEDIMENTATION RATE] IN BLOOD BY WESTERGREN METHOD: 5 MM/HR
HCT VFR BLD CALC: 43.8 %
HGB BLD-MCNC: 14.2 G/DL
IMM GRANULOCYTES NFR BLD AUTO: 0.3 %
LYMPHOCYTES # BLD AUTO: 1.39 K/UL
LYMPHOCYTES NFR BLD AUTO: 19.9 %
MAN DIFF?: NORMAL
MCHC RBC-ENTMCNC: 30.9 PG
MCHC RBC-ENTMCNC: 32.4 GM/DL
MCV RBC AUTO: 95.2 FL
MONOCYTES # BLD AUTO: 0.53 K/UL
MONOCYTES NFR BLD AUTO: 7.6 %
NEUTROPHILS # BLD AUTO: 4.91 K/UL
NEUTROPHILS NFR BLD AUTO: 70.5 %
PLATELET # BLD AUTO: 258 K/UL
POTASSIUM SERPL-SCNC: 4.7 MMOL/L
PROT SERPL-MCNC: 6.6 G/DL
RBC # BLD: 4.6 M/UL
RBC # FLD: 13.7 %
SODIUM SERPL-SCNC: 143 MMOL/L
WBC # FLD AUTO: 6.97 K/UL

## 2024-01-03 ENCOUNTER — TRANSCRIPTION ENCOUNTER (OUTPATIENT)
Age: 64
End: 2024-01-03

## 2024-01-05 ENCOUNTER — NON-APPOINTMENT (OUTPATIENT)
Age: 64
End: 2024-01-05

## 2024-01-05 ENCOUNTER — APPOINTMENT (OUTPATIENT)
Dept: HEART FAILURE | Facility: CLINIC | Age: 64
End: 2024-01-05
Payer: COMMERCIAL

## 2024-01-05 VITALS
HEIGHT: 64 IN | HEART RATE: 72 BPM | SYSTOLIC BLOOD PRESSURE: 152 MMHG | WEIGHT: 154 LBS | OXYGEN SATURATION: 98 % | DIASTOLIC BLOOD PRESSURE: 72 MMHG | BODY MASS INDEX: 26.29 KG/M2

## 2024-01-05 VITALS — DIASTOLIC BLOOD PRESSURE: 73 MMHG | SYSTOLIC BLOOD PRESSURE: 129 MMHG

## 2024-01-05 DIAGNOSIS — I10 ESSENTIAL (PRIMARY) HYPERTENSION: ICD-10-CM

## 2024-01-05 DIAGNOSIS — I42.2 OTHER HYPERTROPHIC CARDIOMYOPATHY: ICD-10-CM

## 2024-01-05 PROCEDURE — 93000 ELECTROCARDIOGRAM COMPLETE: CPT

## 2024-01-05 PROCEDURE — 99214 OFFICE O/P EST MOD 30 MIN: CPT | Mod: 25

## 2024-01-05 RX ORDER — LOSARTAN POTASSIUM 50 MG/1
50 TABLET, FILM COATED ORAL
Qty: 30 | Refills: 2 | Status: ACTIVE | COMMUNITY
Start: 2023-05-31 | End: 1900-01-01

## 2024-01-05 RX ORDER — METHOTREXATE 2.5 MG/1
2.5 TABLET ORAL
Qty: 32 | Refills: 2 | Status: ACTIVE | COMMUNITY
Start: 2023-07-26

## 2024-01-05 RX ORDER — BUPROPION HYDROCHLORIDE 150 MG/1
150 TABLET, FILM COATED, EXTENDED RELEASE ORAL
Qty: 60 | Refills: 2 | Status: ACTIVE | COMMUNITY
Start: 2024-01-05 | End: 1900-01-01

## 2024-01-05 RX ORDER — AMLODIPINE BESYLATE 5 MG/1
5 TABLET ORAL DAILY
Qty: 30 | Refills: 0 | Status: ACTIVE | COMMUNITY
Start: 2024-01-05

## 2024-01-20 NOTE — REVIEW OF SYSTEMS
[FreeTextEntry2] : see HPI [FreeTextEntry1] : 14-point ROS negative or noncontributory except as stated in HPI

## 2024-01-20 NOTE — ASSESSMENT
[FreeTextEntry1] : Ms. Franklin is a 62 y/o F w/ h/o large vessel vasculitis (aortitis Dx 3/21; prev on tocilizumab [Actemra]), possible cardiac sarcoid (based on MRI/PET scan; now on MTX), prior PVC ablation (4/2016), active tobacco use (12 pk-yr; 5 cig/day) who presents for f/u of care. Other comorbidities include Hepatitis C (s/p Rx), latent TBI s/p Rx with INH x 6 months. Currently appears NYHA class I-II without limitations with recent TTE 10/23 with normal LVEF 58% and is otherwise compensated but improved B/P readings to 130-140's but still hypertensive.  Recent consideration of cardiac sarcoid given LGE in apex on MRI with perfusion defect and FDG uptake in same region on cardiac PET scan. While sarcoid can also cause aortitis it is rare and,in her case, it would be unusual to have cardiac and large vessel involvement without other systemic involvement. Consideration is also apical HCM especially in light of ECG abnormalities that is consistent with this, however, wouldn't cause FDG uptake as was seen. Of note, has a strong family history of a cardiomyopathy and has undergone genetic testing at [facility] which was reviewed and fairly unremarkable. Unclear significance of pathogenic variant of GAA (heterozygous) but will check alpha-glucosidase levels to see if there's a deficiency.  1. Possible sarcoidosis - unclear but suspicion raised due to h/o PVC ablation, Aortitis, and FDG uptake on cardiac PET - No further episodes of pain in diaphragm with SOB while bending similar to when diagnosed with aortitis however MRA w/o any evidence of inflammation - follow up chest CT 9/5/23 with IV contrast; no aortic wall thickening or enhancement to suggest active inflammation. Short segment moderate narrowing of the left subclavian artery increased compared to 5/10/2021 CTA. No associated wall enhancement. - no potential site of biopsy - given family history of SCD, reasonable to recommend EP study, she did a 2 week MCOT first with no significant arrhythmias - ESR/CRP normal (however was on Actemra) - referred to Dr. Newton for genetic counseling; results pending - currently on methotrexate 2.5 mg tabs- 6 tabs weekly; would consider repeat PET scan to see if FDG uptake improved  2. L Subclavian stenosis - no significant difference in UE and no symptoms - 10/30/23 carotid dopplers and upper extremity arterial dopplers with no significant stenosis.  - reviewed MRI - lipid panel above goal; increase Atorva to 40 mg daily  3. HTN - uncontrolled - reviewed BP medications and was not taking losartan - will increase losartan to 50 mg daily from 25 mg daily - on toprol 75 mg daily  4. PVCs - quiescent; s/p ablation 2016; monitor w/o PVCs and <1% PACs - on toprol 75 mg daily - MCOT as above- no significant arrhythmias  5. Smoking - will start Wellbutrin 150 mg daily for 3 days and then 150 mg bid and will try to stop smoking after 5 days of Wellbutrin and will report if she is successful - will follow up with PCP, cardiologist to repeat fasting lipid profile and rheumatologist  RTC 6 months

## 2024-01-20 NOTE — COUNSELING
[Cessation strategies including cessation program discussed] : Cessation strategies including cessation program discussed [Yes] : Willing to quit smoking [FreeTextEntry2] : Wellbutrin 150 mg bid

## 2024-01-20 NOTE — HISTORY OF PRESENT ILLNESS
[FreeTextEntry1] : Ms. Franklin is a 64 y/o F w/ h/o large vessel vasculitis (aortitis Dx 3/21; prev on tocilizumab [Actemra]), possible cardiac sarcoid (based on MRI/PET scan; now on MTX), prior PVC ablation (4/2016), active tobacco use (12 pk-yr; 5 cig/day) who presents for f/u of care. Other comorbidities include Hepatitis C (s/p Rx), latent TBI s/p Rx with INH x 6 months. Referred by Dr. Сергей Patino (rheumatology).Also followed by Dr. Mac Best (primary cardiologist).   For full initial details, please refer to note from 5/26/23. Obtained genetic testing from Dr. 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 Has since seen Dr. Newton for further genetic testing (results pending).   Since last visit, had been relatively well. Her Actemra was changed to MTX in July by Dr. Сергей Patino for treatment of presumed sarcoidosis and has decreased to 6 tabs weekly from 8 tabs. Dr. Сергей Patino may consider changing to Humira.   Has continued working as a  with no further events event at work while on the plane. Last episode of breathing issue while flying was 9/3 in setting of elevated BP requiring ASA, nitro. Had some bendopnea.   Had a cardiac MRI of chest 9/5/23 that showed no aortic wall thickening or enhancement to suggest active inflammation but did show short segment moderate narrowing of the left subclavian artery increased compared to 5/10/2021 CTA. No associated wall enhancement. Had a TTE with normal LVEF 58% 10/30/23 with carotid and arterial dopplers of upper extremities with no significant stenosis.   Currently, she feels well and is able to walk and work without dyspnea. She avoids going up stairs due to dyspnea. She is also breathless with any bending or looking down.   Sleeps well with no orthopnea/PND. BP at home has improved from 130's-140s after adding amlodipine 5 mg daily. Home B/P is usually < 5 mmHg higher in her right arm than her left.   Previously, she wanted to avoid a defibrillator as she's concerned about the constraints it may have on her lifestyle. Doesn't feel palpitations. Had a 48-hour Holter monitor in January 2023 with Dr. Best which didn't show any arrhythmias reportedly. She had a [employer] ambulatory monitor 5/26-6/10/23 (didn't work when international) with no AF, SVT, pauses, heart block or VT with HR range  bpm and 0% PVC burden and < 1% PAC's.   Denies visual issues like uveitis. Denies skin rashes. Denies cough. Admits to smoking 1/2 PPD (has been smoking since age 17) and is reluctant to stop as she is afraid of weight gain. She has not exercised in a gym in 4 years and has lost muscle mass and feels smoking cessation would lead to significant weight gain. Risk calculator with 20% risk of cardiac event if she doesn't stop.   Denies palpitations and syncope. Pt does not have an ICD. Of note, states her sister has an ICD and brother who passed away suddenly.

## 2024-01-20 NOTE — END OF VISIT
[Time Spent: ___ minutes] : I have spent [unfilled] minutes of time on the encounter. [FreeTextEntry3] : I was physically present for the key portions of the evaluation and management (E/M) service provided.  I agree with the above history, physical, and plan which I have reviewed and edited where appropriate.

## 2024-01-20 NOTE — CARDIOLOGY SUMMARY
[de-identified] : 1/5/24 - NSR 6, TN 96 ms, LVH with TWI inferior and lateral 9/29/23  NSR 68, TN 96 ms, LVH with TWI inferior and lateral 5/26/23 - NSR, LVH with TWI inferior and lateral [de-identified] : 10/30/23 TTE:-EF 58%, normal LV systolic, normal RV systolic function, IVC normal < 2.1 cm, LVIDD 4.2 cm  9/9/21 - TTE - EF 55%, mild-mod MR, mildly dilated LA, apex appears thickened, nl RV size/function, RVSP 29 [de-identified] : \par  12/1/22 - Cardiac MRI - EF 53%, LA prominent, patchy area of high signal in inferior and inferolateral walls measuring up to 1.4 cm (considerations include apical HCM or cardiac sarcoid) [de-identified] : \par  2/28/23 - Myocardial/Whole body PET - no FDG avid LN; no lung nodules; small perfusion defect at LV apex; EF 76%; increased FDG uptake at LV apex (SUV 4.3; corresponds to wall thickening and LGE seen on cardiac MRI); diffuse mild FDG uptake in walls of aorta (most marked in thoracic; decreased since 1/19/22)\par  \par  11/29/22 (on Actemra) - whole body PET - interval decrease/resolution of FDG activity in walls of thoracic and abdominal aorta and its branches suggestiing response to interval therapy \par  \par  1/19/22 - Whole body PET - FDG avidity in walls of thoracic and abdominal aorta with activity extending into R brachiocephalic and b/l subclavian arteries and b/l iliac and femoral vessels\par   [de-identified] : [employer] ([employer]) ambulatory monitor 6/10-6/23/23 (didn't work when international) with no AF, SVT, pauses, heart block or VT with HR range  bpm and 0% PVC burden and < 1% PAC's. [de-identified] : CT chest 9/5/23 with IV contrast ;  no aortic wall thickening or enhancement to suggest active inflammation. Short segment moderate narrowing of the left subclavian artery increased compared to 5/10/2021 CTA. No associated wall enhancement. [de-identified] : \par  5/15/23 PFTs FEV1 2.17 (88%), FVC 2.75 (85%), FEV1/FVC 79%, DLCO 69% (normal)

## 2024-01-31 ENCOUNTER — TRANSCRIPTION ENCOUNTER (OUTPATIENT)
Age: 64
End: 2024-01-31

## 2024-02-07 ENCOUNTER — TRANSCRIPTION ENCOUNTER (OUTPATIENT)
Age: 64
End: 2024-02-07

## 2024-02-07 ENCOUNTER — APPOINTMENT (OUTPATIENT)
Dept: CARDIOLOGY | Facility: CLINIC | Age: 64
End: 2024-02-07
Payer: COMMERCIAL

## 2024-02-07 ENCOUNTER — RX RENEWAL (OUTPATIENT)
Age: 64
End: 2024-02-07

## 2024-02-07 PROCEDURE — 99215 OFFICE O/P EST HI 40 MIN: CPT

## 2024-02-07 NOTE — FAMILY HISTORY
[FreeTextEntry1] : FamilyHistory_20_twCiteListControlStart FamilyHistory_20_twCiteListControlEnd [id number]  A four-generation family history was constructed and scanned into [employer].  Family history is significant for:  brother CM SCD, arrythmia  her maternal families originate from *[place] and paternal families originate from [place] No Ashkenazi Bahai ancestry.  Family history was positive/ negative for consanguinity   No family history of SIDS    [FreeTextEntry2] : [place] [FreeTextEntry3] : [place]

## 2024-02-07 NOTE — DISCUSSION/SUMMARY
[TextEntry] : Combined Cardiac Sequencing and Deletion/Duplication Panel Result: Uncertain Clinical Significance Gene Mode of Inheritance Variant Zygosity Classification ANK2 Autosomal dominant [id number] p.([id number]) Heterozygous Variant of Uncertain Significance BAG3 Autosomal dominant c.1126 C>T p.(P376S) Heterozygous Variant of Uncertain Significance  GAA Autosomal recessive c.266 G>A p.(R89H) Heterozygous Variant of Uncertain Significance TTN Autosomal dominant, Autosomal recessive c.8056 A>T p.(W1674X) Heterozygous Variant of Uncertain Significance  [employer] results she underwent genetic testing (sent by Dr. 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 BAG3 Autosomal dominant c.1126 C>T p.(P376S) Heterozygous Variant of Uncertain Significance GAA Autosomal recessive c.266 G>A p.(R89H) Heterozygous Pathogenic variant   Results from Gene dx did not identify any pathogenic causes for her PVCs or cardiac condition.  Additionally [employer] interprets the variant in GAA as variant of uncertain significance (VUS).  Further evaluation according to Clinvar shows that 3 entries for her variant in GAA are classified as variant of uncertain significance (VUS) and one classifies it as pathogenic. It appears that [employer] has changed its classification for her variant in GAA from pathogenic to variant of uncertain significance (VUS).  Either way Pompe is a recessive condition and this genetic finding alone will not be the cause of her condition.  The BAG3 variant of uncertain significance (VUS) was again identified and my interpretation of this variant of uncertain significance (VUS) is that it is unlikely, low clinical suspicion for causing her condition.  Another variant of uncertain significance (VUS) was identified in the ANK2 gene.  Pathogenic variants in ANK2 are associated with Long QT, Brugada and WPW.  Review of her EKGS does not support a diagnosis of any of these conditions. Additionally the variant of uncertain significance (VUS) present in ADAMS PIMENTEL in ANK2 does not change the protein structure or function.  Together I do not believe the variant of uncertain significance (VUS) in ANK2 is of low clinical suspicion, unlikely to be causing her condition.  for her recommend continued medical care as per her cardiology team   for her family At this time we recommend all of her  first degree relatives undergo a clinical cardiac evaluation with history, physical exam, EKG and ECHO. If their initial clinical evaluation is normal they should continued to have clinical cardiac evaluation with imaging every 3-5 years (for adults).

## 2024-02-07 NOTE — PLAN
[TextEntry] : See above note for recommended management. A copy of genetic testing results and clinic note will be sent to  the referring cardiologist   or physician We encourage sharing these results with family members, Contact the Cardiogenomics program at [facility] or the lab directly every few years to check on any changes in interpretation of a VUS, or if there are changes in the personal or family cardiac history. Long-term management and surveillance for patient should be based on the patients clinical treatment as recommended by their cardiologist.   Any changes in cardiac surveillance should be discussed with the patients physician.  We remain available should there be any new information for personal or family history.  If there are any additional questions, please feel free to call the Cardiogenomics program at [facility], Maegan Smalls MS, KIMI or Jalil Newton MD, PhD at [phone] Time spent counseling the patient during the visit was 45 min. I spent 45 minutes on the encounter   Patient seen with Maegan Smalls MS, CGC, board certified genetic counsellor

## 2024-02-07 NOTE — HISTORY OF PRESENT ILLNESS
[Home] : at home, [unfilled] , at the time of the visit. [Medical Office: ([facility])___] : at the medical office located in  [Verbal consent obtained from patient] : the patient, [unfilled] [FreeTextEntry1] : ADAMS PIMENTEL is a 62yo F PMH large vessel vasculitis (aortitis Dx 3/21; prev on tocilizumab [Actemra]), possible cardiac sarcoid (based on MRI/PET scan), prior PVC ablation (4/2016), active tobacco use (12 pk-yr; 5 cig/day)  as per a note she underwent genetic testing (sent by Dr. 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  no report is available unclear if this is her result, will try   symptoms began 8 years ago  Dr. Mac Best (primary cardiologist). today she presents for results of additional testing

## 2024-02-07 NOTE — REASON FOR VISIT
[FreeTextEntry3] : Dear Dr. Esquivel           . I saw your patient ADAMS PIMENTEL on 2/7/24. Please see the note below for the assessment and plan.   ADAMS PIMENTEL  was seen  for an initial consultation at the Cardiogenomics Program at [facility] on 11/08/2023.   Ms. PIMENTEL was referred by Dr. Esquivel for hereditary cardiac predisposition risk assessment and counseling, due to hx NICM

## 2024-02-07 NOTE — SIGNATURES
[TextEntry] : Jalil Newton MD, PhD  Medical Director Program for Cardiac Genetics, Genomics and Precision Medicine Department of Cardiology [facility]  [employer] at [address] [place] [address] Tel: [phone] Fax: [phone]  ([facility] office) [address], 3rd floor (between [address]) [place] [address] (p) [phone] (f) [phone]

## 2024-02-07 NOTE — ASSESSMENT
[TextEntry] : ADAMS PIMENTEL is a 64yo F PMH large vessel vasculitis (aortitis Dx 3/21; prev on tocilizumab [Actemra]), possible cardiac sarcoid (based on MRI/PET scan), prior PVC ablation (4/2016), active tobacco use (12 pk-yr; 5 cig/day)  she underwent further genetic testing  Results from Gene dx did not identify any pathogenic causes for her PVCs or cardiac condition.  Additionally Genedx interprets the variant in GAA as variant of uncertain significance (VUS).  Further evaluation according to Clinvar shows that 3 entries for her variant in GAA are classified as variant of uncertain significance (VUS) and one classifies it as pathogenic. It appears that [employer] has changed its classification for her variant in GAA from pathogenic to variant of uncertain significance (VUS).  Either way Pompe is a recessive condition and this genetic finding alone will not be the cause of her condition.  The BAG3 variant of uncertain significance (VUS) was again identified and my interpretation of this variant of uncertain significance (VUS) is that it is unlikly, low clinical suspicion for causing her condition.  Another variant of uncertain significance (VUS) was identified in the ANK2 gene.  Pathogenic variants in ANK2 are associated with Long QT, Brugada and WPW.  Review of her EKGS does not support a diagnosis of any of these conditions. Additionally the variant of uncertain significance (VUS) present in ADAMS PIMENTEL in ANK2 does not change the protein structure or function.  Together I do not believe the variant of uncertain significance (VUS) in ANK2 is of low clinical suspicion, unlikely to be causing her condition.  for her recommend continued medical care as per her cardiology team   for her family At this time we recommend all of her  first degree relatives undergo a clinical cardiac evaluation with history, physical exam, EKG and ECHO. If their initial clinical evaluation is normal they should continued to have clinical cardiac evaluation with imaging every 3-5 years (for adults).      Genetic knowledge changes rapidly.  We encourage re-contacting the cardiogenomics program at [facility] if there are significant changes in family or personal health. ADAMS PIMENTEL expressed understanding of the presented information and satisfaction with having her questions and concerns addressed.

## 2024-02-08 NOTE — ED PROVIDER NOTE - MDM ORDERS SUBMITTED SELECTION
[Time Spent: ___ minutes] : I have spent [unfilled] minutes of time on the encounter. Labs/Imaging Studies/Medications

## 2024-03-01 ENCOUNTER — APPOINTMENT (OUTPATIENT)
Dept: RHEUMATOLOGY | Facility: CLINIC | Age: 64
End: 2024-03-01
Payer: COMMERCIAL

## 2024-03-01 ENCOUNTER — LABORATORY RESULT (OUTPATIENT)
Age: 64
End: 2024-03-01

## 2024-03-01 VITALS
OXYGEN SATURATION: 98 % | SYSTOLIC BLOOD PRESSURE: 133 MMHG | BODY MASS INDEX: 25.66 KG/M2 | HEIGHT: 65 IN | RESPIRATION RATE: 16 BRPM | HEART RATE: 66 BPM | DIASTOLIC BLOOD PRESSURE: 73 MMHG | WEIGHT: 154 LBS

## 2024-03-01 DIAGNOSIS — I42.9 CARDIOMYOPATHY, UNSPECIFIED: ICD-10-CM

## 2024-03-01 DIAGNOSIS — M81.0 AGE-RELATED OSTEOPOROSIS W/OUT CURRENT PATHOLOGICAL FRACTURE: ICD-10-CM

## 2024-03-01 PROCEDURE — 99215 OFFICE O/P EST HI 40 MIN: CPT

## 2024-03-01 NOTE — ASSESSMENT
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 Started TCZ weekly on 2/25/22 PET CT Nov 2022 : almost resolution of FDG activity in the walls of the thoracic and abdominal aorta and its branches suggesting response to therapy switched to TCZ q 2 weeks dosing since December 2022, has been off Actemra since June 2023 Started MTX 6 tabs/week MRA September 2023: no evidence of active inflammation, moderate narrowing of left subclavian artery  2- Cardiomyopathy, possible sarcoid CM  Cardiac PET reviewed and discussed with radiologist: Focal FDG uptake at the left ventricular apex with associated small perfusion defect, corresponding to myocardial wall thickening and late gadolinium enhancement seen on MRI, favor cardiac sarcoidosis No FDG avid mediastinal or hilar lymphadenopathy or pulmonary nodules Patient has a longstanding history of arrhythmia and palpitation, status post ablation many years ago -patient has no overt extra cardiac manifestations of sarcoidosis (seen on PET or historically) -discussed that aortitis is very rare in sarcoidosis and it is possible that she has two separate issues -ACE normal, Vitamin D 1, 25 normal, no hyper Ca  - ophthalmology evaluation for screening -s.p mobile cardiac telemetry: no arrhythmias -switched to MTX (from TCXZ) as above, based on discussions with Dr Esquivel -s/p cardiac genetics eval and testing ,largely unrevealing   3-High risk medication use:   4-osteoporosis  DEXA 2/16/2022: T score L-spine -2.7 Started Alendronate March 2023, tolerating well    5- Vaccination status/immunosuppressed status:  COVID vaccine completed , booster :11/30/21 - second booster received 5/4/22, third booster 11/20/22 PCV 13 -received PPSV 23 administered 2/10/22 Shingrix-first dose 2/25-**Second dose administered 6/18/22 Flu vaccine received 2023    6- MGUS, following with hematologist   7- History of positive QTB  completed a course of rifampin from 09/2021- 1/1/22    8-History of HCV treatment>6 years ago ~2015- HCV RNA undetectable s.p hepatology evaluation cleared to start MTX   8- New onset of distorted vision,  Now follows at [facility], diagnosed with choroidal neovascularization and started on intraocular bevacizumab (Avastin)  Plan: [] obtain monitoring blood work today [] check ESR and CRP today [] HCV RNA today [] continue with MTX 6 tabs/week [] continue daily folic acid [] Repeat imaging planned for June for monitoring of progression (cardiac sarcoid + aortitis)  [] repeat DEXA ordered today  [] continue close follow up with retina specialist   RTO in 2 months

## 2024-03-01 NOTE — HISTORY OF PRESENT ILLNESS
[de-identified] : interval events [FreeTextEntry1] : saw a retina specialist on 1/10/24 in [place] , diagnosed with choroidal neovascularization and started on intraocular  bevacizumab (Avastin) no headaches, no jaw claudication no chest pain or SOB no other complaints

## 2024-03-01 NOTE — HISTORY OF PRESENT ILLNESS
[de-identified] : interval events [FreeTextEntry1] : saw a retina specialist on 1/10/24 in [place] , diagnosed with choroidal neovascularization and started on intraocular  bevacizumab (Avastin) no headaches, no jaw claudication no chest pain or SOB no other complaints

## 2024-03-01 NOTE — ASSESSMENT
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 Started TCZ weekly on 2/25/22 PET CT Nov 2022 : almost resolution of FDG activity in the walls of the thoracic and abdominal aorta and its branches suggesting response to therapy switched to TCZ q 2 weeks dosing since December 2022, has been off Actemra since June 2023 Started MTX 6 tabs/week MRA September 2023: no evidence of active inflammation, moderate narrowing of left subclavian artery  2- Cardiomyopathy, possible sarcoid CM  Cardiac PET reviewed and discussed with radiologist: Focal FDG uptake at the left ventricular apex with associated small perfusion defect, corresponding to myocardial wall thickening and late gadolinium enhancement seen on MRI, favor cardiac sarcoidosis No FDG avid mediastinal or hilar lymphadenopathy or pulmonary nodules Patient has a longstanding history of arrhythmia and palpitation, status post ablation many years ago -patient has no overt extra cardiac manifestations of sarcoidosis (seen on PET or historically) -discussed that aortitis is very rare in sarcoidosis and it is possible that she has two separate issues -ACE normal, Vitamin D 1, 25 normal, no hyper Ca  - ophthalmology evaluation for screening -s.p mobile cardiac telemetry: no arrhythmias -switched to MTX (from TCXZ) as above, based on discussions with Dr Esquivel -s/p cardiac genetics eval and testing ,largely unrevealing   3-High risk medication use:   4-osteoporosis  DEXA 2/16/2022: T score L-spine -2.7 Started Alendronate March 2023, tolerating well    5- Vaccination status/immunosuppressed status:  COVID vaccine completed , booster :11/30/21 - second booster received 5/4/22, third booster 11/20/22 PCV 13 -received PPSV 23 administered 2/10/22 Shingrix-first dose 2/25-**Second dose administered 6/18/22 Flu vaccine received 2023    6- MGUS, following with hematologist   7- History of positive QTB  completed a course of rifampin from 09/2021- 1/1/22    8-History of HCV treatment>6 years ago ~2015- HCV RNA undetectable s.p hepatology evaluation cleared to start MTX   8- New onset of distorted vision,  Now follows at [facility], diagnosed with choroidal neovascularization and started on intraocular bevacizumab (Avastin)  Plan: [] obtain monitoring blood work today [] check ESR and CRP today [] HCV RNA today [] continue with MTX 6 tabs/week [] continue daily folic acid [] Repeat imaging planned for June for monitoring of progression (cardiac sarcoid + aortitis)  [] repeat DEXA ordered today  [] continue close follow up with retina specialist   RTO in 2 months

## 2024-03-04 LAB
ALBUMIN SERPL ELPH-MCNC: 4.6 G/DL
ALP BLD-CCNC: 71 U/L
ALT SERPL-CCNC: 23 U/L
ANION GAP SERPL CALC-SCNC: 14 MMOL/L
AST SERPL-CCNC: 19 U/L
BASOPHILS # BLD AUTO: 0.04 K/UL
BASOPHILS NFR BLD AUTO: 0.7 %
BILIRUB SERPL-MCNC: 0.5 MG/DL
BUN SERPL-MCNC: 13 MG/DL
CALCIUM SERPL-MCNC: 9.7 MG/DL
CHLORIDE SERPL-SCNC: 106 MMOL/L
CO2 SERPL-SCNC: 24 MMOL/L
CREAT SERPL-MCNC: 0.66 MG/DL
CRP SERPL-MCNC: <3 MG/L
EGFR: 99 ML/MIN/1.73M2
EOSINOPHIL # BLD AUTO: 0.13 K/UL
EOSINOPHIL NFR BLD AUTO: 2.3 %
ERYTHROCYTE [SEDIMENTATION RATE] IN BLOOD BY WESTERGREN METHOD: 14 MM/HR
HCT VFR BLD CALC: 39.3 %
HCV AB SER QL: REACTIVE
HCV S/CO RATIO: 13.41 S/CO
HGB BLD-MCNC: 13.3 G/DL
IMM GRANULOCYTES NFR BLD AUTO: 0.2 %
LYMPHOCYTES # BLD AUTO: 1.41 K/UL
LYMPHOCYTES NFR BLD AUTO: 25.4 %
MAN DIFF?: NORMAL
MCHC RBC-ENTMCNC: 32.7 PG
MCHC RBC-ENTMCNC: 33.8 GM/DL
MCV RBC AUTO: 96.6 FL
MONOCYTES # BLD AUTO: 0.54 K/UL
MONOCYTES NFR BLD AUTO: 9.7 %
NEUTROPHILS # BLD AUTO: 3.43 K/UL
NEUTROPHILS NFR BLD AUTO: 61.7 %
PLATELET # BLD AUTO: 268 K/UL
POTASSIUM SERPL-SCNC: 4.8 MMOL/L
PROT SERPL-MCNC: 6.5 G/DL
RBC # BLD: 4.07 M/UL
RBC # FLD: 13.5 %
SODIUM SERPL-SCNC: 144 MMOL/L
WBC # FLD AUTO: 5.56 K/UL

## 2024-03-06 RX ORDER — ATORVASTATIN CALCIUM 40 MG/1
40 TABLET, FILM COATED ORAL
Qty: 90 | Refills: 3 | Status: ACTIVE | COMMUNITY
Start: 1900-01-01 | End: 1900-01-01

## 2024-04-13 ENCOUNTER — EMERGENCY (EMERGENCY)
Facility: HOSPITAL | Age: 64
LOS: 0 days | Discharge: HOME | End: 2024-04-13
Attending: EMERGENCY MEDICINE
Payer: COMMERCIAL

## 2024-04-13 VITALS
OXYGEN SATURATION: 100 % | RESPIRATION RATE: 20 BRPM | TEMPERATURE: 99 F | SYSTOLIC BLOOD PRESSURE: 105 MMHG | DIASTOLIC BLOOD PRESSURE: 66 MMHG | HEART RATE: 89 BPM | WEIGHT: 154.1 LBS | HEIGHT: 65 IN

## 2024-04-13 VITALS
TEMPERATURE: 98 F | OXYGEN SATURATION: 100 % | SYSTOLIC BLOOD PRESSURE: 118 MMHG | HEART RATE: 73 BPM | RESPIRATION RATE: 18 BRPM | DIASTOLIC BLOOD PRESSURE: 73 MMHG

## 2024-04-13 DIAGNOSIS — R53.1 WEAKNESS: ICD-10-CM

## 2024-04-13 DIAGNOSIS — F17.210 NICOTINE DEPENDENCE, CIGARETTES, UNCOMPLICATED: ICD-10-CM

## 2024-04-13 DIAGNOSIS — Z87.442 PERSONAL HISTORY OF URINARY CALCULI: ICD-10-CM

## 2024-04-13 DIAGNOSIS — I10 ESSENTIAL (PRIMARY) HYPERTENSION: ICD-10-CM

## 2024-04-13 DIAGNOSIS — R42 DIZZINESS AND GIDDINESS: ICD-10-CM

## 2024-04-13 DIAGNOSIS — R19.7 DIARRHEA, UNSPECIFIED: ICD-10-CM

## 2024-04-13 DIAGNOSIS — Z98.890 OTHER SPECIFIED POSTPROCEDURAL STATES: Chronic | ICD-10-CM

## 2024-04-13 DIAGNOSIS — N39.0 URINARY TRACT INFECTION, SITE NOT SPECIFIED: ICD-10-CM

## 2024-04-13 DIAGNOSIS — G56.01 CARPAL TUNNEL SYNDROME, RIGHT UPPER LIMB: Chronic | ICD-10-CM

## 2024-04-13 DIAGNOSIS — M79.10 MYALGIA, UNSPECIFIED SITE: ICD-10-CM

## 2024-04-13 DIAGNOSIS — Z98.89 OTHER SPECIFIED POSTPROCEDURAL STATES: Chronic | ICD-10-CM

## 2024-04-13 DIAGNOSIS — R63.0 ANOREXIA: ICD-10-CM

## 2024-04-13 DIAGNOSIS — D86.9 SARCOIDOSIS, UNSPECIFIED: ICD-10-CM

## 2024-04-13 DIAGNOSIS — Z20.822 CONTACT WITH AND (SUSPECTED) EXPOSURE TO COVID-19: ICD-10-CM

## 2024-04-13 LAB
ALBUMIN SERPL ELPH-MCNC: 2.8 G/DL — LOW (ref 3.3–5)
ALP SERPL-CCNC: 54 U/L — SIGNIFICANT CHANGE UP (ref 40–120)
ALT FLD-CCNC: 17 U/L — SIGNIFICANT CHANGE UP (ref 12–78)
ANION GAP SERPL CALC-SCNC: 9 MMOL/L — SIGNIFICANT CHANGE UP (ref 5–17)
APPEARANCE UR: ABNORMAL
AST SERPL-CCNC: 16 U/L — SIGNIFICANT CHANGE UP (ref 15–37)
BACTERIA # UR AUTO: ABNORMAL /HPF
BASOPHILS # BLD AUTO: 0.02 K/UL — SIGNIFICANT CHANGE UP (ref 0–0.2)
BASOPHILS NFR BLD AUTO: 0.2 % — SIGNIFICANT CHANGE UP (ref 0–2)
BILIRUB SERPL-MCNC: 0.6 MG/DL — SIGNIFICANT CHANGE UP (ref 0.2–1.2)
BILIRUB UR-MCNC: NEGATIVE — SIGNIFICANT CHANGE UP
BUN SERPL-MCNC: 13 MG/DL — SIGNIFICANT CHANGE UP (ref 7–23)
CALCIUM SERPL-MCNC: 8.8 MG/DL — SIGNIFICANT CHANGE UP (ref 8.5–10.1)
CHLORIDE SERPL-SCNC: 106 MMOL/L — SIGNIFICANT CHANGE UP (ref 96–108)
CO2 SERPL-SCNC: 24 MMOL/L — SIGNIFICANT CHANGE UP (ref 22–31)
COLOR SPEC: YELLOW — SIGNIFICANT CHANGE UP
COMMENT - URINE: SIGNIFICANT CHANGE UP
CREAT SERPL-MCNC: 0.77 MG/DL — SIGNIFICANT CHANGE UP (ref 0.5–1.3)
DIFF PNL FLD: ABNORMAL
EGFR: 87 ML/MIN/1.73M2 — SIGNIFICANT CHANGE UP
EOSINOPHIL # BLD AUTO: 0.01 K/UL — SIGNIFICANT CHANGE UP (ref 0–0.5)
EOSINOPHIL NFR BLD AUTO: 0.1 % — SIGNIFICANT CHANGE UP (ref 0–6)
EPI CELLS # UR: PRESENT
FLUAV AG NPH QL: SIGNIFICANT CHANGE UP
FLUBV AG NPH QL: SIGNIFICANT CHANGE UP
GLUCOSE SERPL-MCNC: 115 MG/DL — HIGH (ref 70–99)
GLUCOSE UR QL: NEGATIVE MG/DL — SIGNIFICANT CHANGE UP
HCT VFR BLD CALC: 34.7 % — SIGNIFICANT CHANGE UP (ref 34.5–45)
HGB BLD-MCNC: 11.8 G/DL — SIGNIFICANT CHANGE UP (ref 11.5–15.5)
IMM GRANULOCYTES NFR BLD AUTO: 0.2 % — SIGNIFICANT CHANGE UP (ref 0–0.9)
KETONES UR-MCNC: NEGATIVE MG/DL — SIGNIFICANT CHANGE UP
LEUKOCYTE ESTERASE UR-ACNC: ABNORMAL
LIDOCAIN IGE QN: 14 U/L — SIGNIFICANT CHANGE UP (ref 13–75)
LYMPHOCYTES # BLD AUTO: 0.89 K/UL — LOW (ref 1–3.3)
LYMPHOCYTES # BLD AUTO: 9.8 % — LOW (ref 13–44)
MAGNESIUM SERPL-MCNC: 2.1 MG/DL — SIGNIFICANT CHANGE UP (ref 1.6–2.6)
MCHC RBC-ENTMCNC: 31.6 PG — SIGNIFICANT CHANGE UP (ref 27–34)
MCHC RBC-ENTMCNC: 34 G/DL — SIGNIFICANT CHANGE UP (ref 32–36)
MCV RBC AUTO: 93 FL — SIGNIFICANT CHANGE UP (ref 80–100)
MONOCYTES # BLD AUTO: 1.19 K/UL — HIGH (ref 0–0.9)
MONOCYTES NFR BLD AUTO: 13.1 % — SIGNIFICANT CHANGE UP (ref 2–14)
NEUTROPHILS # BLD AUTO: 6.95 K/UL — SIGNIFICANT CHANGE UP (ref 1.8–7.4)
NEUTROPHILS NFR BLD AUTO: 76.6 % — SIGNIFICANT CHANGE UP (ref 43–77)
NITRITE UR-MCNC: NEGATIVE — SIGNIFICANT CHANGE UP
NRBC # BLD: 0 /100 WBCS — SIGNIFICANT CHANGE UP (ref 0–0)
PH UR: 6 — SIGNIFICANT CHANGE UP (ref 5–8)
PLATELET # BLD AUTO: 201 K/UL — SIGNIFICANT CHANGE UP (ref 150–400)
POTASSIUM SERPL-MCNC: 3.8 MMOL/L — SIGNIFICANT CHANGE UP (ref 3.5–5.3)
POTASSIUM SERPL-SCNC: 3.8 MMOL/L — SIGNIFICANT CHANGE UP (ref 3.5–5.3)
PROT SERPL-MCNC: 6.6 GM/DL — SIGNIFICANT CHANGE UP (ref 6–8.3)
PROT UR-MCNC: 30 MG/DL
RBC # BLD: 3.73 M/UL — LOW (ref 3.8–5.2)
RBC # FLD: 12.4 % — SIGNIFICANT CHANGE UP (ref 10.3–14.5)
RBC CASTS # UR COMP ASSIST: 10 /HPF — HIGH (ref 0–4)
SARS-COV-2 RNA SPEC QL NAA+PROBE: SIGNIFICANT CHANGE UP
SODIUM SERPL-SCNC: 139 MMOL/L — SIGNIFICANT CHANGE UP (ref 135–145)
SP GR SPEC: 1.01 — SIGNIFICANT CHANGE UP (ref 1–1.03)
UROBILINOGEN FLD QL: 0.2 MG/DL — SIGNIFICANT CHANGE UP (ref 0.2–1)
WBC # BLD: 9.08 K/UL — SIGNIFICANT CHANGE UP (ref 3.8–10.5)
WBC # FLD AUTO: 9.08 K/UL — SIGNIFICANT CHANGE UP (ref 3.8–10.5)
WBC UR QL: ABNORMAL /HPF (ref 0–5)

## 2024-04-13 PROCEDURE — 99284 EMERGENCY DEPT VISIT MOD MDM: CPT

## 2024-04-13 PROCEDURE — 71045 X-RAY EXAM CHEST 1 VIEW: CPT | Mod: 26

## 2024-04-13 RX ORDER — SODIUM CHLORIDE 9 MG/ML
1000 INJECTION INTRAMUSCULAR; INTRAVENOUS; SUBCUTANEOUS ONCE
Refills: 0 | Status: COMPLETED | OUTPATIENT
Start: 2024-04-13 | End: 2024-04-13

## 2024-04-13 RX ORDER — ACETAMINOPHEN 500 MG
1000 TABLET ORAL ONCE
Refills: 0 | Status: COMPLETED | OUTPATIENT
Start: 2024-04-13 | End: 2024-04-13

## 2024-04-13 RX ORDER — CEFDINIR 250 MG/5ML
1 POWDER, FOR SUSPENSION ORAL
Qty: 14 | Refills: 0
Start: 2024-04-13 | End: 2024-04-19

## 2024-04-13 RX ORDER — CEFTRIAXONE 500 MG/1
1000 INJECTION, POWDER, FOR SOLUTION INTRAMUSCULAR; INTRAVENOUS ONCE
Refills: 0 | Status: COMPLETED | OUTPATIENT
Start: 2024-04-13 | End: 2024-04-13

## 2024-04-13 RX ADMIN — Medication 400 MILLIGRAM(S): at 12:56

## 2024-04-13 RX ADMIN — SODIUM CHLORIDE 500 MILLILITER(S): 9 INJECTION INTRAMUSCULAR; INTRAVENOUS; SUBCUTANEOUS at 12:55

## 2024-04-13 RX ADMIN — CEFTRIAXONE 100 MILLIGRAM(S): 500 INJECTION, POWDER, FOR SOLUTION INTRAMUSCULAR; INTRAVENOUS at 17:57

## 2024-04-13 NOTE — ED PROVIDER NOTE - ATTENDING APP SHARED VISIT CONTRIBUTION OF CARE
I have personally seen and examined this pt I have fully participated in the care of this pt I have made amendments to the documentation where appropriate and otherwise hx, examine and plans are documented by the ACP. Pt is alert and oriented x 3 speaking in clear full sentences appears very comfortable Pt was also c/o hearing noise but denies hx of psych, depression, harmful thoughts to herself or others, Pt sts she travels to [place] every 6 weeks to visit her family. no urgent  pysch eval is indicated.

## 2024-04-13 NOTE — ED ADULT NURSE NOTE - NSFALLHARMRISKINTERV_ED_ALL_ED

## 2024-04-13 NOTE — ED ADULT NURSE NOTE - OBJECTIVE STATEMENT
Patient A+OX4 generalized body aches and pain, weakness and diarrhea. Patient recently traveled to [place] and [place] for 1 month, was treated in [place] for kidney stones. Since arriving home, patient states, "I have pain in my stomach, hands, quads, I was hearing voices and seeing things, like a screen saver from back in the day coming at me, today I had diarrhea." Patient denies any SI/HI, no current hallucinations, denies that hallucinations were command. Currently only complaining of weakness, hunger, fatigue, body aches, and diarrhea

## 2024-04-13 NOTE — ED PROVIDER NOTE - NS ED ROS FT
CONSTITUTIONAL: No fever, no chills.    EYES: No visual changes  ENT: No ear pain, no sore throat  CARDIOVASCULAR: No chest pain, no palpitations  RESPIRATORY: No cough, no SOB  GI: No abdominal pain, no nausea, no vomiting, no constipation.  GENITOURINARY: No dysuria, no frequency, no hematuria  SKIN: No rash  NEURO: No headache    ALL OTHER SYSTEMS NEGATIVE.

## 2024-04-13 NOTE — ED PROVIDER NOTE - CARE PLAN
1 Principal Discharge DX:	Myalgia   Principal Discharge DX:	Myalgia  Secondary Diagnosis:	Acute UTI

## 2024-04-13 NOTE — ED PROVIDER NOTE - PATIENT PORTAL LINK FT
You can access the FollowMyHealth Patient Portal offered by [facility] by registering at the following website: [email or web address]. By joining [employer]’s FollowMyHealth portal, you will also be able to view your health information using other applications (apps) compatible with our system.

## 2024-04-13 NOTE — ED PROVIDER NOTE - NSFOLLOWUPINSTRUCTIONS_ED_ALL_ED_FT
Rest, drink plenty of fluids.  Advance activity as tolerated.  Continue all previously prescribed medications as directed.  Follow up with your primary care physician in 48-72 hours- bring copies of your results.  Return to the ER for worsening or persistent symptoms, and/or ANY NEW OR CONCERNING SYMPTOMS. If you have issues obtaining follow up, please call: [phone] to obtain a doctor or specialist who takes your insurance in your area.  You may call [phone] to make an appointment with the internal medicine clinic.

## 2024-04-13 NOTE — ED PROVIDER NOTE - CLINICAL SUMMARY MEDICAL DECISION MAKING FREE TEXT BOX
62 y/o female with sarcoidosis, cardiomyopathy, htn here with bodyaches x 3 days, decrease appetite, diarrhea.   Vs stable.   LIkely viral illness.   Will obtain basic labs, flu/covid, cxr r/o PNA. 64 y/o female with sarcoidosis, cardiomyopathy, htn here with bodyaches x 3 days, decrease appetite, diarrhea.   Vs stable.   LIkely viral illness.   Will obtain basic labs, flu/covid, cxr r/o PNA.    labs reviewed and unremarkable. NO leukocytosis. FLu covid negative.   CXR negative for PNA. UA shows UTI. Will treat with rocephin.   Pt reassessed and reports feeling better.   Rx cefdinir sent to pharmacy.   Pt stable to be discharged home . Strict return precautions given.

## 2024-04-13 NOTE — ED ADULT TRIAGE NOTE - CHIEF COMPLAINT QUOTE
dizziness, hallucinating,  pain around waist, poor appetite, weakness x3 days, diarrhea today and no eating.  hx htn

## 2024-04-13 NOTE — ED PROVIDER NOTE - OBJECTIVE STATEMENT
62 y/o female with sarcoidosis, cardiomyopathy htn here with body aches x 3 days. Pt reports feeling dizzy and weakness when standing up. Pt reports having body pain all over and diarrhea x 2 episodes. Pt also noted with auditory and visual hallucinations. Pt states she is hearing voices and seen screen savers. Pt also states she has decrease appetite. Denies sick contact. Pt returned from [place] 6 days ago. Denies fever, chills, vomiting, chest pain, dyspnea, cough.  Mild burning with urination. Pt states she had a kidney stone while she was in [place] because she was fasting. Denies SI/HI.

## 2024-04-16 LAB
-  AMOXICILLIN/CLAVULANIC ACID: SIGNIFICANT CHANGE UP
-  AMPICILLIN/SULBACTAM: SIGNIFICANT CHANGE UP
-  AMPICILLIN: SIGNIFICANT CHANGE UP
-  AZTREONAM: SIGNIFICANT CHANGE UP
-  CEFAZOLIN: SIGNIFICANT CHANGE UP
-  CEFEPIME: SIGNIFICANT CHANGE UP
-  CEFTRIAXONE: SIGNIFICANT CHANGE UP
-  CEFUROXIME: SIGNIFICANT CHANGE UP
-  CIPROFLOXACIN: SIGNIFICANT CHANGE UP
-  ERTAPENEM: SIGNIFICANT CHANGE UP
-  GENTAMICIN: SIGNIFICANT CHANGE UP
-  IMIPENEM: SIGNIFICANT CHANGE UP
-  LEVOFLOXACIN: SIGNIFICANT CHANGE UP
-  MEROPENEM: SIGNIFICANT CHANGE UP
-  NITROFURANTOIN: SIGNIFICANT CHANGE UP
-  PIPERACILLIN/TAZOBACTAM: SIGNIFICANT CHANGE UP
-  TOBRAMYCIN: SIGNIFICANT CHANGE UP
-  TRIMETHOPRIM/SULFAMETHOXAZOLE: SIGNIFICANT CHANGE UP
CULTURE RESULTS: ABNORMAL
METHOD TYPE: SIGNIFICANT CHANGE UP
ORGANISM # SPEC MICROSCOPIC CNT: ABNORMAL
ORGANISM # SPEC MICROSCOPIC CNT: SIGNIFICANT CHANGE UP
SPECIMEN SOURCE: SIGNIFICANT CHANGE UP

## 2024-05-03 ENCOUNTER — RX RENEWAL (OUTPATIENT)
Age: 64
End: 2024-05-03

## 2024-05-03 RX ORDER — ALENDRONATE SODIUM 70 MG/1
70 TABLET ORAL
Qty: 12 | Refills: 0 | Status: ACTIVE | COMMUNITY
Start: 2023-03-21 | End: 1900-01-01

## 2024-05-28 ENCOUNTER — APPOINTMENT (OUTPATIENT)
Dept: RHEUMATOLOGY | Facility: CLINIC | Age: 64
End: 2024-05-28
Payer: COMMERCIAL

## 2024-05-28 ENCOUNTER — LABORATORY RESULT (OUTPATIENT)
Age: 64
End: 2024-05-28

## 2024-05-28 VITALS
HEART RATE: 73 BPM | TEMPERATURE: 98.1 F | DIASTOLIC BLOOD PRESSURE: 89 MMHG | HEIGHT: 65 IN | WEIGHT: 154 LBS | OXYGEN SATURATION: 98 % | BODY MASS INDEX: 25.66 KG/M2 | RESPIRATION RATE: 16 BRPM | SYSTOLIC BLOOD PRESSURE: 164 MMHG

## 2024-05-28 DIAGNOSIS — Z79.899 OTHER LONG TERM (CURRENT) DRUG THERAPY: ICD-10-CM

## 2024-05-28 DIAGNOSIS — I77.6 ARTERITIS, UNSPECIFIED: ICD-10-CM

## 2024-05-28 DIAGNOSIS — Z86.19 PERSONAL HISTORY OF OTHER INFECTIOUS AND PARASITIC DISEASES: ICD-10-CM

## 2024-05-28 DIAGNOSIS — D86.85 SARCOID MYOCARDITIS: ICD-10-CM

## 2024-05-28 PROCEDURE — G2211 COMPLEX E/M VISIT ADD ON: CPT | Mod: NC,1L

## 2024-05-28 PROCEDURE — 99214 OFFICE O/P EST MOD 30 MIN: CPT

## 2024-05-28 NOTE — ASSESSMENT
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 Started TCZ weekly on 2/25/22 PET CT Nov 2022 : almost resolution of FDG activity in the walls of the thoracic and abdominal aorta and its branches suggesting response to therapy switched to TCZ q 2 weeks dosing since December 2022, has been off Actemra since June 2023 Started MTX 6 tabs/week MRA September 2023: no evidence of active inflammation, moderate narrowing of left subclavian artery  2- Cardiomyopathy, possible sarcoid CM  Cardiac PET reviewed and discussed with radiologist: Focal FDG uptake at the left ventricular apex with associated small perfusion defect, corresponding to myocardial wall thickening and late gadolinium enhancement seen on MRI, favor cardiac sarcoidosis No FDG avid mediastinal or hilar lymphadenopathy or pulmonary nodules Patient has a longstanding history of arrhythmia and palpitation, status post ablation many years ago -patient has no overt extra cardiac manifestations of sarcoidosis (seen on PET or historically) -discussed that aortitis is very rare in sarcoidosis and it is possible that she has two separate issues -ACE normal, Vitamin D 1, 25 normal, no hyper Ca  - ophthalmology evaluation for screening -s.p mobile cardiac telemetry: no arrhythmias -switched to MTX (from TCXZ) as above, based on discussions with Dr Esquivel -s/p cardiac genetics eval and testing ,largely unrevealing  3-High risk medication use:   4-osteoporosis  DEXA 2/16/2022: T score L-spine -2.7 Started Alendronate March 2023, tolerating well    5- Vaccination status/immunosuppressed status:  COVID vaccine completed , booster :11/30/21 - second booster received 5/4/22, third booster 11/20/22 PCV 13 -received PPSV 23 administered 2/10/22 Shingrix-first dose 2/25-**Second dose administered 6/18/22 Flu vaccine received 2023    6- MGUS, following with hematologist   7- History of positive QTB  completed a course of rifampin from 09/2021- 1/1/22    8-History of HCV treatment>6 years ago ~2015- HCV RNA undetectable s.p hepatology evaluation cleared to start MTX  8- New onset of distorted vision, Now follows at [facility], diagnosed with choroidal neovascularization and started on intraocular bevacizumab (Avastin)  Plan: [] obtain monitoring blood work today [] check ESR and CRP today [] HCV RNA today [] continue with MTX 6 tabs/week [] continue daily folic acid [] Repeat imaging planned for June for monitoring of progression (cardiac sarcoid + aortitis) will discuss treatment changes based on above  [] repeat DEXA scheduled for June  [] continue close follow up with retina specialist  RTO after imaging

## 2024-05-28 NOTE — HISTORY OF PRESENT ILLNESS
[de-identified] : since last visit [FreeTextEntry1] : 4/13 was at [facility] ER for UTI and was feeling weak tired and lethargic treated with ABx and resolved no recurrence since  remains on MTX 6 tabs/week

## 2024-05-28 NOTE — PHYSICAL EXAM
[General Appearance - Alert] : alert [General Appearance - In No Acute Distress] : in no acute distress [Musculoskeletal - Swelling] : no joint swelling seen [Impaired Insight] : insight and judgment were intact [Auscultation Breath Sounds / Voice Sounds] : lungs were clear to auscultation bilaterally [Heart Sounds] : normal S1 and S2

## 2024-05-28 NOTE — REVIEW OF SYSTEMS
[As Noted in HPI] : as noted in HPI [Fever] : no fever [Chills] : no chills [Skin Lesions] : no skin lesions [FreeTextEntry9] : right hip pain with walking

## 2024-05-29 LAB
ALBUMIN SERPL ELPH-MCNC: 4.8 G/DL
ALP BLD-CCNC: 76 U/L
ALT SERPL-CCNC: 27 U/L
ANION GAP SERPL CALC-SCNC: 14 MMOL/L
AST SERPL-CCNC: 25 U/L
BASOPHILS # BLD AUTO: 0.05 K/UL
BASOPHILS NFR BLD AUTO: 0.7 %
BILIRUB SERPL-MCNC: 0.7 MG/DL
BUN SERPL-MCNC: 13 MG/DL
CALCIUM SERPL-MCNC: 9.5 MG/DL
CHLORIDE SERPL-SCNC: 105 MMOL/L
CO2 SERPL-SCNC: 24 MMOL/L
CREAT SERPL-MCNC: 0.67 MG/DL
CRP SERPL-MCNC: 4 MG/L
EGFR: 98 ML/MIN/1.73M2
EOSINOPHIL # BLD AUTO: 0.13 K/UL
EOSINOPHIL NFR BLD AUTO: 1.8 %
ERYTHROCYTE [SEDIMENTATION RATE] IN BLOOD BY WESTERGREN METHOD: 19 MM/HR
HBV CORE IGG+IGM SER QL: NONREACTIVE
HBV SURFACE AG SER QL: NONREACTIVE
HCT VFR BLD CALC: 40.1 %
HCV AB SER QL: REACTIVE
HCV S/CO RATIO: 13.52 S/CO
HGB BLD-MCNC: 13.3 G/DL
IMM GRANULOCYTES NFR BLD AUTO: 0.3 %
LYMPHOCYTES # BLD AUTO: 1.75 K/UL
LYMPHOCYTES NFR BLD AUTO: 24 %
MAN DIFF?: NORMAL
MCHC RBC-ENTMCNC: 31.4 PG
MCHC RBC-ENTMCNC: 33.2 GM/DL
MCV RBC AUTO: 94.6 FL
MONOCYTES # BLD AUTO: 0.51 K/UL
MONOCYTES NFR BLD AUTO: 7 %
NEUTROPHILS # BLD AUTO: 4.84 K/UL
NEUTROPHILS NFR BLD AUTO: 66.2 %
PLATELET # BLD AUTO: 319 K/UL
POTASSIUM SERPL-SCNC: 4.7 MMOL/L
PROT SERPL-MCNC: 7 G/DL
RBC # BLD: 4.24 M/UL
RBC # FLD: 14.4 %
SODIUM SERPL-SCNC: 143 MMOL/L
WBC # FLD AUTO: 7.3 K/UL

## 2024-06-10 ENCOUNTER — NON-APPOINTMENT (OUTPATIENT)
Age: 64
End: 2024-06-10

## 2024-06-24 ENCOUNTER — APPOINTMENT (OUTPATIENT)
Dept: MRI IMAGING | Facility: CLINIC | Age: 64
End: 2024-06-24

## 2024-06-24 ENCOUNTER — OUTPATIENT (OUTPATIENT)
Dept: OUTPATIENT SERVICES | Facility: HOSPITAL | Age: 64
LOS: 1 days | End: 2024-06-24
Payer: COMMERCIAL

## 2024-06-24 DIAGNOSIS — I77.6 ARTERITIS, UNSPECIFIED: ICD-10-CM

## 2024-06-24 DIAGNOSIS — G56.01 CARPAL TUNNEL SYNDROME, RIGHT UPPER LIMB: Chronic | ICD-10-CM

## 2024-06-24 DIAGNOSIS — Z98.890 OTHER SPECIFIED POSTPROCEDURAL STATES: Chronic | ICD-10-CM

## 2024-06-24 PROCEDURE — A9585: CPT

## 2024-06-24 PROCEDURE — C8911: CPT

## 2024-06-24 PROCEDURE — 71555 MRI ANGIO CHEST W OR W/O DYE: CPT | Mod: 26

## 2024-06-24 PROCEDURE — C8902: CPT

## 2024-06-24 PROCEDURE — 74185 MRA ABD W OR W/O CNTRST: CPT | Mod: 26

## 2024-07-01 ENCOUNTER — TRANSCRIPTION ENCOUNTER (OUTPATIENT)
Age: 64
End: 2024-07-01

## 2024-07-01 DIAGNOSIS — M25.551 PAIN IN RIGHT HIP: ICD-10-CM

## 2024-07-10 ENCOUNTER — NON-APPOINTMENT (OUTPATIENT)
Age: 64
End: 2024-07-10

## 2024-07-11 ENCOUNTER — APPOINTMENT (OUTPATIENT)
Dept: ORTHOPEDIC SURGERY | Facility: CLINIC | Age: 64
End: 2024-07-11
Payer: COMMERCIAL

## 2024-07-11 ENCOUNTER — NON-APPOINTMENT (OUTPATIENT)
Age: 64
End: 2024-07-11

## 2024-07-11 DIAGNOSIS — M16.11 UNILATERAL PRIMARY OSTEOARTHRITIS, RIGHT HIP: ICD-10-CM

## 2024-07-11 PROCEDURE — 99205 OFFICE O/P NEW HI 60 MIN: CPT

## 2024-07-12 ENCOUNTER — APPOINTMENT (OUTPATIENT)
Dept: HEART FAILURE | Facility: CLINIC | Age: 64
End: 2024-07-12
Payer: COMMERCIAL

## 2024-07-12 ENCOUNTER — NON-APPOINTMENT (OUTPATIENT)
Age: 64
End: 2024-07-12

## 2024-07-12 VITALS
DIASTOLIC BLOOD PRESSURE: 77 MMHG | WEIGHT: 149 LBS | OXYGEN SATURATION: 100 % | SYSTOLIC BLOOD PRESSURE: 125 MMHG | BODY MASS INDEX: 24.83 KG/M2 | HEIGHT: 65 IN | HEART RATE: 63 BPM

## 2024-07-12 PROCEDURE — 99214 OFFICE O/P EST MOD 30 MIN: CPT | Mod: 25

## 2024-07-12 PROCEDURE — 93000 ELECTROCARDIOGRAM COMPLETE: CPT

## 2024-07-17 ENCOUNTER — APPOINTMENT (OUTPATIENT)
Dept: MRI IMAGING | Facility: CLINIC | Age: 64
End: 2024-07-17

## 2024-07-17 ENCOUNTER — OUTPATIENT (OUTPATIENT)
Dept: OUTPATIENT SERVICES | Facility: HOSPITAL | Age: 64
LOS: 1 days | End: 2024-07-17
Payer: COMMERCIAL

## 2024-07-17 DIAGNOSIS — Z98.89 OTHER SPECIFIED POSTPROCEDURAL STATES: Chronic | ICD-10-CM

## 2024-07-17 DIAGNOSIS — I77.6 ARTERITIS, UNSPECIFIED: ICD-10-CM

## 2024-07-17 DIAGNOSIS — Z00.8 ENCOUNTER FOR OTHER GENERAL EXAMINATION: ICD-10-CM

## 2024-07-17 DIAGNOSIS — Z98.890 OTHER SPECIFIED POSTPROCEDURAL STATES: Chronic | ICD-10-CM

## 2024-07-17 DIAGNOSIS — G56.01 CARPAL TUNNEL SYNDROME, RIGHT UPPER LIMB: Chronic | ICD-10-CM

## 2024-07-17 PROCEDURE — A9585: CPT

## 2024-07-17 PROCEDURE — 70549 MR ANGIOGRAPH NECK W/O&W/DYE: CPT

## 2024-07-17 PROCEDURE — 70549 MR ANGIOGRAPH NECK W/O&W/DYE: CPT | Mod: 26

## 2024-07-22 ENCOUNTER — RX RENEWAL (OUTPATIENT)
Age: 64
End: 2024-07-22

## 2024-07-22 RX ORDER — CELECOXIB 200 MG/1
200 CAPSULE ORAL DAILY
Qty: 14 | Refills: 0 | Status: ACTIVE | COMMUNITY
Start: 2024-07-11 | End: 1900-01-01

## 2024-07-31 ENCOUNTER — APPOINTMENT (OUTPATIENT)
Dept: NUCLEAR MEDICINE | Facility: IMAGING CENTER | Age: 64
End: 2024-07-31

## 2024-07-31 ENCOUNTER — OUTPATIENT (OUTPATIENT)
Dept: OUTPATIENT SERVICES | Facility: HOSPITAL | Age: 64
LOS: 1 days | End: 2024-07-31
Payer: COMMERCIAL

## 2024-07-31 DIAGNOSIS — G56.01 CARPAL TUNNEL SYNDROME, RIGHT UPPER LIMB: Chronic | ICD-10-CM

## 2024-07-31 DIAGNOSIS — D86.85 SARCOID MYOCARDITIS: ICD-10-CM

## 2024-07-31 PROCEDURE — A9552: CPT

## 2024-07-31 PROCEDURE — A9500: CPT

## 2024-07-31 PROCEDURE — 78816 PET IMAGE W/CT FULL BODY: CPT

## 2024-07-31 PROCEDURE — 78451 HT MUSCLE IMAGE SPECT SING: CPT

## 2024-08-05 ENCOUNTER — RX RENEWAL (OUTPATIENT)
Age: 64
End: 2024-08-05

## 2024-08-06 ENCOUNTER — APPOINTMENT (OUTPATIENT)
Dept: RHEUMATOLOGY | Facility: CLINIC | Age: 64
End: 2024-08-06

## 2024-08-06 ENCOUNTER — LABORATORY RESULT (OUTPATIENT)
Age: 64
End: 2024-08-06

## 2024-08-06 PROCEDURE — 99215 OFFICE O/P EST HI 40 MIN: CPT | Mod: GC

## 2024-08-06 PROCEDURE — G2211 COMPLEX E/M VISIT ADD ON: CPT | Mod: NC

## 2024-08-06 NOTE — HISTORY OF PRESENT ILLNESS
[de-identified] : at today's visit [FreeTextEntry1] : Last visit May 2024 MTX 6 tabs/week completed imaging reports feeling well overall, no recurrence of chest pain or SOB she continues to have right hip pain, does not want to proceed with replacement, looking for a second opinion

## 2024-08-06 NOTE — ASSESSMENT
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 Started TCZ weekly on 2/25/22 PET CT Nov 2022 : almost resolution of FDG activity in the walls of the thoracic and abdominal aorta and its branches suggesting response to therapy switched to TCZ q 2 weeks dosing since December 2022, has been off Actemra since June 2023 Started MTX 6 tabs/week MRA September 2023: no evidence of active inflammation, moderate narrowing of left subclavian artery  2- Cardiomyopathy, possible sarcoid CM  Cardiac PET reviewed and discussed with radiologist: Focal FDG uptake at the left ventricular apex with associated small perfusion defect, corresponding to myocardial wall thickening and late gadolinium enhancement seen on MRI, favor cardiac sarcoidosis No FDG avid mediastinal or hilar lymphadenopathy or pulmonary nodules Patient has a longstanding history of arrhythmia and palpitation, status post ablation many years ago -patient has no overt extra cardiac manifestations of sarcoidosis (seen on PET or historically) -discussed that aortitis is very rare in sarcoidosis and it is possible that she has two separate issues -ACE normal, Vitamin D 1, 25 normal, no hyper Ca   -s.p mobile cardiac telemetry: no arrhythmias -switched to MTX (from TCXZ) as above, based on discussions with Dr Esquivel -s/p cardiac genetics eval and testing ,largely unrevealing  Repeat imaging June/July (detailed above), summary MRA chest/abdomen June 2024: no signs of aortitis  MRA neck: right and left carotid Normal ,vertebral circulation: Mildly augmented spiral tortuosity is nonspecific PET July 2024: Resting myocardial perfusion demonstrates a small perfusion defect at the left ventricular apex, unchanged This corresponds to myocardial wall thickening and late gadolinium enhancement seen on previous cardiac MRI They may be related to apical hypertrophic cardiomyopathy or cardiac sarcoidosis. Diffusely increased FDG activity in the wall of the aorta, most marked in the thoracic aorta, and appears increased as compared to prior study, possibly related to differences in technique/scanner. For reference, a region of the descending thoracic aorta demonstrates SUV 4.9 (image 140); previous SUV 2.9. There is no corresponding abnormality on CT or MRI   3-High risk medication use: needs monitoring q 2-3 months via labs and exam   4-osteoporosis  DEXA 2/16/2022: T score L-spine -2.7 Started Alendronate March 2023, tolerating well DEXA July 2024 T score at L-spine -2.1     5- Vaccination status/immunosuppressed status:  COVID vaccine completed , booster :11/30/21 - second booster received 5/4/22, third booster 11/20/22 PCV 13 -received PPSV 23 administered 2/10/22 Shingrix-first dose 2/25-**Second dose administered 6/18/22 Flu vaccine received 2023    6- MGUS, following with hematologist   7- History of positive QTB  completed a course of rifampin from 09/2021- 1/1/22    8-History of HCV treatment>6 years ago ~2015- HCV RNA undetectable s.p hepatology evaluation cleared to start MTX  8- New onset of distorted vision, Now follows at [facility], diagnosed with choroidal neovascularization and started on intraocular bevacizumab (Avastin)  Plan: [] reviewed imaging completed recently  discussed adding TNFi left message for Dr Esquivel to discuss  [] obtain monitoring blood work today [] check ESR and CRP today [] HCV RNA today [] continue with MTX 6 tabs/week for now will reduce dose once on TNFi [] continue daily folic acid [] continue close follow up with retina specialist  RTO in 4 weeks

## 2024-08-06 NOTE — ASSESSMENT
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 Started TCZ weekly on 2/25/22 PET CT Nov 2022 : almost resolution of FDG activity in the walls of the thoracic and abdominal aorta and its branches suggesting response to therapy switched to TCZ q 2 weeks dosing since December 2022, has been off Actemra since June 2023 Started MTX 6 tabs/week MRA September 2023: no evidence of active inflammation, moderate narrowing of left subclavian artery  2- Cardiomyopathy, possible sarcoid CM  Cardiac PET reviewed and discussed with radiologist: Focal FDG uptake at the left ventricular apex with associated small perfusion defect, corresponding to myocardial wall thickening and late gadolinium enhancement seen on MRI, favor cardiac sarcoidosis No FDG avid mediastinal or hilar lymphadenopathy or pulmonary nodules Patient has a longstanding history of arrhythmia and palpitation, status post ablation many years ago -patient has no overt extra cardiac manifestations of sarcoidosis (seen on PET or historically) -discussed that aortitis is very rare in sarcoidosis and it is possible that she has two separate issues -ACE normal, Vitamin D 1, 25 normal, no hyper Ca   -s.p mobile cardiac telemetry: no arrhythmias -switched to MTX (from TCXZ) as above, based on discussions with Dr Esquivel -s/p cardiac genetics eval and testing ,largely unrevealing  Repeat imaging June/July (detailed above), summary MRA chest/abdomen June 2024: no signs of aortitis  MRA neck: right and left carotid Normal ,vertebral circulation: Mildly augmented spiral tortuosity is nonspecific PET July 2024: Resting myocardial perfusion demonstrates a small perfusion defect at the left ventricular apex, unchanged This corresponds to myocardial wall thickening and late gadolinium enhancement seen on previous cardiac MRI They may be related to apical hypertrophic cardiomyopathy or cardiac sarcoidosis. Diffusely increased FDG activity in the wall of the aorta, most marked in the thoracic aorta, and appears increased as compared to prior study, possibly related to differences in technique/scanner. For reference, a region of the descending thoracic aorta demonstrates SUV 4.9 (image 140); previous SUV 2.9. There is no corresponding abnormality on CT or MRI   3-High risk medication use: needs monitoring q 2-3 months via labs and exam   4-osteoporosis  DEXA 2/16/2022: T score L-spine -2.7 Started Alendronate March 2023, tolerating well DEXA July 2024 T score at L-spine -2.1     5- Vaccination status/immunosuppressed status:  COVID vaccine completed , booster :11/30/21 - second booster received 5/4/22, third booster 11/20/22 PCV 13 -received PPSV 23 administered 2/10/22 Shingrix-first dose 2/25-**Second dose administered 6/18/22 Flu vaccine received 2023    6- MGUS, following with hematologist   7- History of positive QTB  completed a course of rifampin from 09/2021- 1/1/22    8-History of HCV treatment>6 years ago ~2015- HCV RNA undetectable s.p hepatology evaluation cleared to start MTX  8- New onset of distorted vision, [facility], diagnosed with choroidal neovascularization and started on intraocular bevacizumab (Avastin)  Plan: [] reviewed imaging completed recently  discussed adding TNFi left message for Dr Esquivel to discuss  [] obtain monitoring blood work today [] check ESR and CRP today [] HCV RNA today [] continue with MTX 6 tabs/week for now will reduce dose once on TNFi [] continue daily folic acid [] continue close follow up with retina specialist  RTO in 4 weeks

## 2024-08-06 NOTE — ASSESSMENT
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 Started TCZ weekly on 2/25/22 PET CT Nov 2022 : almost resolution of FDG activity in the walls of the thoracic and abdominal aorta and its branches suggesting response to therapy switched to TCZ q 2 weeks dosing since December 2022, has been off Actemra since June 2023 Started MTX 6 tabs/week MRA September 2023: no evidence of active inflammation, moderate narrowing of left subclavian artery  2- Cardiomyopathy, possible sarcoid CM  Cardiac PET reviewed and discussed with radiologist: Focal FDG uptake at the left ventricular apex with associated small perfusion defect, corresponding to myocardial wall thickening and late gadolinium enhancement seen on MRI, favor cardiac sarcoidosis No FDG avid mediastinal or hilar lymphadenopathy or pulmonary nodules Patient has a longstanding history of arrhythmia and palpitation, status post ablation many years ago -patient has no overt extra cardiac manifestations of sarcoidosis (seen on PET or historically) -discussed that aortitis is very rare in sarcoidosis and it is possible that she has two separate issues -ACE normal, Vitamin D 1, 25 normal, no hyper Ca   -s.p mobile cardiac telemetry: no arrhythmias -switched to MTX (from TCXZ) as above, based on discussions with Dr Esquivel -s/p cardiac genetics eval and testing ,largely unrevealing  Repeat imaging June/July (detailed above), summary MRA chest/abdomen June 2024: no signs of aortitis  MRA neck: right and left carotid Normal ,vertebral circulation: Mildly augmented spiral tortuosity is nonspecific PET July 2024: Resting myocardial perfusion demonstrates a small perfusion defect at the left ventricular apex, unchanged This corresponds to myocardial wall thickening and late gadolinium enhancement seen on previous cardiac MRI They may be related to apical hypertrophic cardiomyopathy or cardiac sarcoidosis. Diffusely increased FDG activity in the wall of the aorta, most marked in the thoracic aorta, and appears increased as compared to prior study, possibly related to differences in technique/scanner. For reference, a region of the descending thoracic aorta demonstrates SUV 4.9 (image 140); previous SUV 2.9. There is no corresponding abnormality on CT or MRI   3-High risk medication use: needs monitoring q 2-3 months via labs and exam   4-osteoporosis  DEXA 2/16/2022: T score L-spine -2.7 Started Alendronate March 2023, tolerating well DEXA July 2024 T score at L-spine -2.1     5- Vaccination status/immunosuppressed status:  COVID vaccine completed , booster :11/30/21 - second booster received 5/4/22, third booster 11/20/22 PCV 13 -received PPSV 23 administered 2/10/22 Shingrix-first dose 2/25-**Second dose administered 6/18/22 Flu vaccine received 2023    6- MGUS, following with hematologist   7- History of positive QTB  completed a course of rifampin from 09/2021- 1/1/22    8-History of HCV treatment>6 years ago ~2015- HCV RNA undetectable s.p hepatology evaluation cleared to start MTX  8- New onset of distorted vision, Now follows at [facility], diagnosed with choroidal neovascularization and started on intraocular bevacizumab (Avastin)  Plan: [] reviewed imaging completed recently  discussed adding TNFi left message for Dr Esquivel to discuss  [] obtain monitoring blood work today [] check ESR and CRP today [] HCV RNA today [] continue with MTX 6 tabs/week for now will reduce dose once on TNFi [] continue daily folic acid [] continue close follow up with retina specialist  RTO in 4 weeks

## 2024-08-06 NOTE — DATA REVIEWED
[FreeTextEntry1] : Labs, imaging and chart notes reviewed today with patient  normal inflammatory markers MRA chest/abdomen June 2024: Normal caliber thoracic aorta. Normal branching pattern of the aortic arch. Mild narrowing of the proximal left subclavian artery appears less pronounced on MRI 9/14/2023. Normal caliber abdominal aorta with patent major branches. No substantial persistent wall thickening or enhancement identified throughout the thoracic or abdominal aorta. No periaortic fatty infiltration.  MRA neck: right and left carotid Normal  vertebral circulation: Mildly augmented spiral tortuosity is nonspecific   PET July 2024: Resting myocardial perfusion demonstrates a small perfusion defect at the left ventricular apex, unchanged This corresponds to myocardial wall thickening and late gadolinium enhancement seen on previous cardiac MRI They may be related to apical hypertrophic cardiomyopathy or cardiac sarcoidosis.  Diffusely increased FDG activity in the wall of the aorta, most marked in the thoracic aorta, and appears increased as compared to prior study, possibly related to differences in technique/scanner. For reference, a region of the descending thoracic aorta demonstrates SUV 4.9 (image 140); previous SUV 2.9. There is no corresponding abnormality on CT or MRI

## 2024-08-06 NOTE — HISTORY OF PRESENT ILLNESS
[de-identified] : at today's visit [FreeTextEntry1] : Last visit May 2024 MTX 6 tabs/week completed imaging reports feeling well overall, no recurrence of chest pain or SOB she continues to have right hip pain, does not want to proceed with replacement, looking for a second opinion

## 2024-08-06 NOTE — HISTORY OF PRESENT ILLNESS
[de-identified] : at today's visit [FreeTextEntry1] : Last visit May 2024 MTX 6 tabs/week completed imaging reports feeling well overall, no recurrence of chest pain or SOB she continues to have right hip pain, does not want to proceed with replacement, looking for a second opinion

## 2024-08-11 ENCOUNTER — OUTPATIENT (OUTPATIENT)
Dept: OUTPATIENT SERVICES | Facility: HOSPITAL | Age: 64
LOS: 1 days | Discharge: HOME | End: 2024-08-11

## 2024-08-11 DIAGNOSIS — D47.2 MONOCLONAL GAMMOPATHY: ICD-10-CM

## 2024-08-11 DIAGNOSIS — G56.01 CARPAL TUNNEL SYNDROME, RIGHT UPPER LIMB: Chronic | ICD-10-CM

## 2024-08-11 DIAGNOSIS — Z98.890 OTHER SPECIFIED POSTPROCEDURAL STATES: Chronic | ICD-10-CM

## 2024-08-11 DIAGNOSIS — Z98.89 OTHER SPECIFIED POSTPROCEDURAL STATES: Chronic | ICD-10-CM

## 2024-08-19 ENCOUNTER — RX RENEWAL (OUTPATIENT)
Age: 64
End: 2024-08-19

## 2024-08-21 ENCOUNTER — RESULT REVIEW (OUTPATIENT)
Age: 64
End: 2024-08-21

## 2024-08-21 ENCOUNTER — APPOINTMENT (OUTPATIENT)
Dept: HEMATOLOGY ONCOLOGY | Facility: CLINIC | Age: 64
End: 2024-08-21
Payer: COMMERCIAL

## 2024-08-21 ENCOUNTER — TRANSCRIPTION ENCOUNTER (OUTPATIENT)
Age: 64
End: 2024-08-21

## 2024-08-21 VITALS
SYSTOLIC BLOOD PRESSURE: 157 MMHG | HEART RATE: 76 BPM | DIASTOLIC BLOOD PRESSURE: 85 MMHG | WEIGHT: 155.21 LBS | HEIGHT: 65 IN | OXYGEN SATURATION: 100 % | BODY MASS INDEX: 25.86 KG/M2 | TEMPERATURE: 97.3 F | RESPIRATION RATE: 16 BRPM

## 2024-08-21 DIAGNOSIS — D47.2 MONOCLONAL GAMMOPATHY: ICD-10-CM

## 2024-08-21 LAB
BASOPHILS # BLD AUTO: 0.04 K/UL — SIGNIFICANT CHANGE UP (ref 0–0.2)
BASOPHILS NFR BLD AUTO: 0.6 % — SIGNIFICANT CHANGE UP (ref 0–2)
EOSINOPHIL # BLD AUTO: 0.1 K/UL — SIGNIFICANT CHANGE UP (ref 0–0.5)
EOSINOPHIL NFR BLD AUTO: 1.5 % — SIGNIFICANT CHANGE UP (ref 0–6)
HCT VFR BLD CALC: 40.8 % — SIGNIFICANT CHANGE UP (ref 34.5–45)
HGB BLD-MCNC: 13.9 G/DL — SIGNIFICANT CHANGE UP (ref 11.5–15.5)
IMM GRANULOCYTES NFR BLD AUTO: 0.2 % — SIGNIFICANT CHANGE UP (ref 0–0.9)
LYMPHOCYTES # BLD AUTO: 1.46 K/UL — SIGNIFICANT CHANGE UP (ref 1–3.3)
LYMPHOCYTES # BLD AUTO: 22.1 % — SIGNIFICANT CHANGE UP (ref 13–44)
MCHC RBC-ENTMCNC: 31.8 PG — SIGNIFICANT CHANGE UP (ref 27–34)
MCHC RBC-ENTMCNC: 34.1 G/DL — SIGNIFICANT CHANGE UP (ref 32–36)
MCV RBC AUTO: 93.4 FL — SIGNIFICANT CHANGE UP (ref 80–100)
MONOCYTES # BLD AUTO: 0.38 K/UL — SIGNIFICANT CHANGE UP (ref 0–0.9)
MONOCYTES NFR BLD AUTO: 5.7 % — SIGNIFICANT CHANGE UP (ref 2–14)
NEUTROPHILS # BLD AUTO: 4.63 K/UL — SIGNIFICANT CHANGE UP (ref 1.8–7.4)
NEUTROPHILS NFR BLD AUTO: 69.9 % — SIGNIFICANT CHANGE UP (ref 43–77)
NRBC # BLD: 0 /100 WBCS — SIGNIFICANT CHANGE UP (ref 0–0)
PLATELET # BLD AUTO: 260 K/UL — SIGNIFICANT CHANGE UP (ref 150–400)
RBC # BLD: 4.37 M/UL — SIGNIFICANT CHANGE UP (ref 3.8–5.2)
RBC # FLD: 13.2 % — SIGNIFICANT CHANGE UP (ref 10.3–14.5)
WBC # BLD: 6.62 K/UL — SIGNIFICANT CHANGE UP (ref 3.8–10.5)
WBC # FLD AUTO: 6.62 K/UL — SIGNIFICANT CHANGE UP (ref 3.8–10.5)

## 2024-08-21 PROCEDURE — G2211 COMPLEX E/M VISIT ADD ON: CPT | Mod: NC

## 2024-08-21 PROCEDURE — 99213 OFFICE O/P EST LOW 20 MIN: CPT

## 2024-08-21 NOTE — HISTORY OF PRESENT ILLNESS
[Disease:__________________________] : Disease: [unfilled] [Treatment Protocol] : Treatment Protocol [de-identified] : 60 yo post-menopausal female with a history of Hep C (s/p curative therapy), vasculitis / aortitis (active), migraines and MGUS (dx 2/2021) here to establish care with a new hematologist. She was previously seen by Dr Tobias Bocanegra who diagnosed her with MGUS after performing a myeloma workup. 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 No increase in blasts.\par  Flow: Monoclonal lambda plasma cell population that is CD38+ encompassed < 1 % of analyzed WBCs\par  Cytogenetics: Normal 46,XX female karyotype\par  FISH: Positive for CCND1/IGH t(11;14) -- Negative for RB1 deletion, TP53 (17p13) deletion, duplication 1q21, FGFR3/IGH t(4;14) and IGH/MAF t(14;16)\par  \par  Imaging:\par  Skeletal Survey 2/8/21: No lytic or blastic osseous lesions\par  CT Chest (w/o contrast) 2/17/21: 3 mm subpleural nodule thought to be postinflammatory or benign lymph node. No destructive lesions or other significant findings.\par  \par  PET-CT 1/19/22: \par  1. FDG avidity along the walls of the thoracic and abdominal aorta, with activity extending into the right brachiocephalic and bilateral subclavian arteries, and bilateral iliac and femoral vessels. This is concerning for vasculitis. \par  2. Cystic lesions in bilateral breasts with associated FDG avid soft tissue in the anterior posterior aspects.\par  3. No abnormal FDG activity in bones\par  \par  Social Hx:\par  , lives in [place], works as an , born in [place]\par  Current Smoker \par  No significant Alcohol use\par  \par  Allergies:\par  NKDA\par  \par  ========================================================\par  Care Providers\par  \par  Prior Hematologist / Oncologist: Dr Tobias Damon ([facility])\par  Rheumatologist: Dr Сергей Patino\par  PMD: Dr. Rolly Beard: [phone]\par  \par  ======================================================== [de-identified] : N/A [de-identified] : See above [de-identified] : Positive for t(11;14) which is favorable in MGUG/Myeloma [de-identified] : 2/9/22: Initial Visit. Ms Rigoberto presents today to establish care with a new hematology team connected to [facility]. Today she complains of severe neck, right ankle, leg/back pain limiting her ability to work and stand for long periods of time. She has had a spinal workup in the past with multiple herniated discs, and also has had steroid injections without significant improvement as of recent (last injection on Alhaji 3, 2022). She has had severe pain in her right ankle for many weeks now and she feels it may radiate from her back or leg. She denied any numbness. This has significantly affected her ability to function both at home and at work and descending altitude on the plane makes the pain unbearable. Recent PET-CT showed recurrent aortitis in Jan 2022. She denies any recent fevers, chills, night sweats, significant weight loss. She also reports that she was recently told that she has smoldering myeloma, which at this time does not appear to be true.  5/11/22: Follow- up. She is getting treated for her vasculitis with Dr Сергей Patino with no recent flare ups. She is now off steroids. She still has been in severe pain in her right ankle (uses a brace now), leg/back pain. Cataract surgery in left eye planned on 5/20/22. She is getting a mammogram and ultrasound on 5/31/22. Denies swelling in ankles even when flying. She bruises easily. Recently had a left tooth abscess that caused facial swelling 3 weeks ago.  She denies any recent fevers, chills, night sweats, significant weight loss. No new lumps or bumps.   8/24/22: Follow-up. Her PCP is concerned about her HTN. Her BP is slightly elevated today in 150/80s range. We checked her home BP device and it is higher than our measurements, she was advised to check also when she sees her PCP. She has been on metoprolol 50 mg for 2 months and losartan 50 mg for last one month with some very mild improvement. She is still working as a  for [employer], her pain is much better controlled and she is able to work now without limitations. Overall she has had a significant improvement since starting Tocilizumab and trigger point injections in her back and right ankle. Compression stockings also help. A few months ago and was hospitalized for abscess, received abx, with subsequent yeast infection, however he has otherwise not experienced any other recent infections.. Patient still smokes cigarettes. No new noticeable masses No trouble swallowing,issues with bowel or urinary function, changes in appetite.   1/18/23: Follow-up. Ms Franklin has had significant improvement in her foot and back pain. She continues to work as a . Her vasculitis has also been under better control, and she is now on less frequent dosing of tocilizumab. She has not experienced any recent infections. She denies constitutional complaints.  7/19/23: Follow-up. Ms feels like she is developing a URI. She has been having sinus congestion and her ears have been popping when flying. She also has issues walking for long periods of time. She continues to work. She recently travelled to [place] for a vacation and was not too restricted. While she she was there she developed a whole body rash after swimming in a pool, most of the rash has since improved over the last 2 weeks, however there is some residual rash on her shins.  8/21/24: Follow-up. Ms Franklin has been feeling well overall. She reports she is asymptomatic in terms of her ongoing vasculitis. She had a recent PET-CT that showed continued inflammation of the large vessels and heart and now Humira is under consideration. She does complain of issues with her right hip but does not affect her ability to fucntion or work. She was evaluated by ortho and was told she needed a hip replacement. She continues to smoke about 5-6 cigs a day.  ___________ A comprehensive review of systems was performed including constitutional, eyes, ENT, cardiovascular, respiratory, gastrointestinal, genitourinary, musculoskeletal, integumentary, neurological, psychiatric and hematologic / lymphatic. All pertinent positives are included in the H&P under interval history above and the remaining review of systems listed are negative.  [FreeTextEntry1] : Observation

## 2024-08-21 NOTE — ASSESSMENT
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   She has no CRAB signs or symptoms and her most recent PET-CT imaging was in Jan 2022. Her disease consists mostly of elevated lambda light chain with an involved vs uninvolved ratio that remains < 100, which has improved slightly since her last visit. She does have a weak M-spike that is not quantifiable on recent blood work. Her last BM biopsy in 2/2021 showed 10% monotypic plasma cells which puts her on the border of MGUS/Smoldering Myeloma, both of which should be monitored and not treated with myeloma-directed therapies (pathology not verified here as of yet). At this time, she does not meet revised IMWG criteria for multiple myeloma and remains on observation.   Previously the Toci given for vasculitis contributed to elevated blood pressures, which now remains elevated while on amlodipine, losartan and metoprolol (3 antihypertensives, doses not maxed out as of now). She had been on tocilizumab from Dec 2022 to June 2023. Since then she has been on low dose methotrexate therapy. Currently under consideration for Humira.   Plan: - Repeat basic MGUS / smoldering myeloma labs with serum immunoelectrophoresis - Monitor serum immunoelectrophoresis every 4 months as long as stable - Hold off additional BM biopsies at this time, can repeat if significant changes in immunoelectrophoresis studies or CBC - Rheum Dr Сергей Patino for management of vasculitis/ sarcoidosis, completed last dose of Tocilizumab in June 2023, plan to start Humira +/- methotrexate pending liver tests. Cleared from heme standpoint for methotrexate and immunologics including Humira. - Cardiology: Had seen Dr Esquivel who was monitoring her for cardiac amyloidosis - Osteoporosis: On alendronate - HTN: follow-up PMD as needed, now on triple therapy - Current smoker: Has tried multiple smoking cessation aids without success, discussed cessation options again today - Continue every 4 months  _____ I personally have spent a total of 25 minutes of time on the date of this encounter reviewing test results, documenting findings, providing education, coordinating care and directly consulting with the patient and/or designated family member.

## 2024-08-22 LAB
ALBUMIN SERPL ELPH-MCNC: 4.7 G/DL
ALP BLD-CCNC: 62 U/L
ALT SERPL-CCNC: 17 U/L
ANION GAP SERPL CALC-SCNC: 15 MMOL/L
AST SERPL-CCNC: 21 U/L
B2 MICROGLOB SERPL-MCNC: 2.4 MG/L
BILIRUB SERPL-MCNC: 0.6 MG/DL
BUN SERPL-MCNC: 19 MG/DL
CALCIUM SERPL-MCNC: 10 MG/DL
CHLORIDE SERPL-SCNC: 105 MMOL/L
CO2 SERPL-SCNC: 25 MMOL/L
CREAT SERPL-MCNC: 0.63 MG/DL
EGFR: 99 ML/MIN/1.73M2
GLUCOSE SERPL-MCNC: 97 MG/DL
POTASSIUM SERPL-SCNC: 4.7 MMOL/L
PROT SERPL-MCNC: 6.7 G/DL
SODIUM SERPL-SCNC: 144 MMOL/L

## 2024-08-28 ENCOUNTER — NON-APPOINTMENT (OUTPATIENT)
Age: 64
End: 2024-08-28

## 2024-08-29 LAB
ALBUMIN MFR SERPL ELPH: 64 %
ALBUMIN SERPL-MCNC: 4.3 G/DL
ALBUMIN/GLOB SERPL: 1.8 RATIO
ALPHA1 GLOB MFR SERPL ELPH: 4.3 %
ALPHA1 GLOB SERPL ELPH-MCNC: 0.3 G/DL
ALPHA2 GLOB MFR SERPL ELPH: 13.8 %
ALPHA2 GLOB SERPL ELPH-MCNC: 0.9 G/DL
B-GLOBULIN MFR SERPL ELPH: 10.8 %
B-GLOBULIN SERPL ELPH-MCNC: 0.7 G/DL
DEPRECATED KAPPA LC FREE/LAMBDA SER: 0.09 RATIO
GAMMA GLOB FLD ELPH-MCNC: 0.5 G/DL
GAMMA GLOB MFR SERPL ELPH: 7.1 %
IGA SER QL IEP: 60 MG/DL
IGG SER QL IEP: 490 MG/DL
IGM SER QL IEP: 21 MG/DL
INTERPRETATION SERPL IEP-IMP: NORMAL
KAPPA LC CSF-MCNC: 10.65 MG/DL
KAPPA LC SERPL-MCNC: 0.92 MG/DL
M PROTEIN SPEC IFE-MCNC: NORMAL
PROT SERPL-MCNC: 6.7 G/DL
PROT SERPL-MCNC: 6.7 G/DL

## 2024-08-29 NOTE — ED ADULT TRIAGE NOTE - ARRIVAL FROM
Dx: Age-related osteoporosis with current pathological fracture, other site, subsequent encounter for fracture with routine healing [M80.0AXD]    S)  Reports increased soreness secondary to having walked increased distances the previous visit.     O)    PROM-AAROM to BLE, D1-D2 PNF pattern with prolonged stretch and hold in the end range to increase functional ROM.          - Distraction + oscillation while in supine position.        - Working below the pain threshold.     Sitting trunk-core stability, all motions left-right-forward-backward-CCW rotation, with patient reaching across midline and out of base of support.     Standing weight shifts [id number], all activity requiring assistance and cueing for completion.        A)  Returns demonstration of HEP as above with instruction to work below pain threshold observing safety awareness, energy conservation and most of all fall prevention.   HEP review with detailed instruction for supine-seated-standing BEL strengthening and dynamic balance training with emphasis on HEP instruction compliance all while working below pain threshold.   All activity working below pain threshold, while monitoring vitals, for increased dynamic balance, BLE strengthening, greater patient independence and fall prevention.     P)  Continuing with current plan of care for dynamic balance, gait, BLE strengthening all for increased patient independence, family ease of care all for fall prevention. Home

## 2024-09-10 ENCOUNTER — TRANSCRIPTION ENCOUNTER (OUTPATIENT)
Age: 64
End: 2024-09-10

## 2024-09-10 ENCOUNTER — RX RENEWAL (OUTPATIENT)
Age: 64
End: 2024-09-10

## 2024-09-20 ENCOUNTER — TRANSCRIPTION ENCOUNTER (OUTPATIENT)
Age: 64
End: 2024-09-20

## 2024-09-24 ENCOUNTER — RX RENEWAL (OUTPATIENT)
Age: 64
End: 2024-09-24

## 2024-09-27 ENCOUNTER — TRANSCRIPTION ENCOUNTER (OUTPATIENT)
Age: 64
End: 2024-09-27

## 2024-09-29 ENCOUNTER — NON-APPOINTMENT (OUTPATIENT)
Age: 64
End: 2024-09-29

## 2024-09-30 ENCOUNTER — APPOINTMENT (OUTPATIENT)
Dept: RHEUMATOLOGY | Facility: CLINIC | Age: 64
End: 2024-09-30
Payer: COMMERCIAL

## 2024-09-30 VITALS
OXYGEN SATURATION: 98 % | WEIGHT: 154 LBS | HEIGHT: 65 IN | DIASTOLIC BLOOD PRESSURE: 70 MMHG | HEART RATE: 63 BPM | BODY MASS INDEX: 25.66 KG/M2 | SYSTOLIC BLOOD PRESSURE: 116 MMHG

## 2024-09-30 DIAGNOSIS — M31.6 OTHER GIANT CELL ARTERITIS: ICD-10-CM

## 2024-09-30 DIAGNOSIS — I42.9 CARDIOMYOPATHY, UNSPECIFIED: ICD-10-CM

## 2024-09-30 DIAGNOSIS — Z79.899 OTHER LONG TERM (CURRENT) DRUG THERAPY: ICD-10-CM

## 2024-09-30 DIAGNOSIS — D86.85 SARCOID MYOCARDITIS: ICD-10-CM

## 2024-09-30 PROCEDURE — 99214 OFFICE O/P EST MOD 30 MIN: CPT

## 2024-09-30 PROCEDURE — G2211 COMPLEX E/M VISIT ADD ON: CPT | Mod: NC

## 2024-09-30 NOTE — HISTORY OF PRESENT ILLNESS
[de-identified] :  At today's visit. [FreeTextEntry1] : MTX 6 tabs/week reports feeling well overall, no recurrence of chest pain or SOB she continues to have right hip pain, does not want to proceed with replacement, looking for a second opinion at [facility]

## 2024-09-30 NOTE — DATA REVIEWED
[FreeTextEntry1] : Labs, imaging and chart notes reviewed today with patient normal inflammatory markers MRA chest/abdomen June 2024: Normal caliber thoracic aorta. Normal branching pattern of the aortic arch. Mild narrowing of the proximal left subclavian artery appears less pronounced on MRI 9/14/2023. Normal caliber abdominal aorta with patent major branches. No substantial persistent wall thickening or enhancement identified throughout the thoracic or abdominal aorta. No periaortic fatty infiltration.  MRA neck: right and left carotid Normal vertebral circulation: Mildly augmented spiral tortuosity is nonspecific   PET July 2024: Resting myocardial perfusion demonstrates a small perfusion defect at the left ventricular apex, unchanged This corresponds to myocardial wall thickening and late gadolinium enhancement seen on previous cardiac MRI They may be related to apical hypertrophic cardiomyopathy or cardiac sarcoidosis.  Diffusely increased FDG activity in the wall of the aorta, most marked in the thoracic aorta, and appears increased as compared to prior study, possibly related to differences in technique/scanner. For reference, a region of the descending thoracic aorta demonstrates SUV 4.9 (image 140); previous SUV 2.9. There is no corresponding abnormality on CT or MRI.     Vitals

## 2024-09-30 NOTE — PHYSICAL EXAM
[General Appearance - Alert] : alert [General Appearance - In No Acute Distress] : in no acute distress [Auscultation Breath Sounds / Voice Sounds] : lungs were clear to auscultation bilaterally [Heart Sounds] : normal S1 and S2 [Musculoskeletal - Swelling] : no joint swelling seen [No Focal Deficits] : no focal deficits [Impaired Insight] : insight and judgment were intact

## 2024-09-30 NOTE — ASSESSMENT
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 Started TCZ weekly on 2/25/22 PET CT Nov 2022 : almost resolution of FDG activity in the walls of the thoracic and abdominal aorta and its branches suggesting response to therapy switched to TCZ q 2 weeks dosing since December 2022, has been off Actemra since June 2023 Started MTX 6 tabs/week MRA September 2023: no evidence of active inflammation, moderate narrowing of left subclavian artery  2- Cardiomyopathy, possible sarcoid CM  Cardiac PET reviewed and discussed with radiologist: Focal FDG uptake at the left ventricular apex with associated small perfusion defect, corresponding to myocardial wall thickening and late gadolinium enhancement seen on MRI, favor cardiac sarcoidosis No FDG avid mediastinal or hilar lymphadenopathy or pulmonary nodules Patient has a longstanding history of arrhythmia and palpitation, status post ablation many years ago -patient has no overt extra cardiac manifestations of sarcoidosis (seen on PET or historically) -discussed that aortitis is very rare in sarcoidosis and it is possible that she has two separate issues -ACE normal, Vitamin D 1, 25 normal, no hyper Ca   -s.p mobile cardiac telemetry: no arrhythmias -switched to MTX (from TCZ) as above, based on discussions with Dr Esquivel -s/p cardiac genetics eval and testing ,largely unrevealing  Repeat imaging June/July (detailed above), summary MRA chest/abdomen June 2024: no signs of aortitis MRA neck: right and left carotid Normal ,vertebral circulation: Mildly augmented spiral tortuosity is nonspecific PET July 2024: Resting myocardial perfusion demonstrates a small perfusion defect at the left ventricular apex, unchanged This corresponds to myocardial wall thickening and late gadolinium enhancement seen on previous cardiac MRI They may be related to apical hypertrophic cardiomyopathy or cardiac sarcoidosis. Diffusely increased FDG activity in the wall of the aorta, most marked in the thoracic aorta, and appears increased as compared to prior study, possibly related to differences in technique/scanner. For reference, a region of the descending thoracic aorta demonstrates SUV 4.9 (image 140); previous SUV 2.9. There is no corresponding abnormality on CT or MRI   3-High risk medication use: needs monitoring q 2-3 months via labs and exam  4-osteoporosis  DEXA 2/16/2022: T score L-spine -2.7 Started Alendronate March 2023, tolerating well DEXA July 2024 T score at L-spine -2.1    5- Vaccination status/immunosuppressed status:  COVID vaccine completed , booster :11/30/21 - second booster received 5/4/22, third booster 11/20/22 PCV 13 -received PPSV 23 administered 2/10/22 Shingrix-first dose 2/25-**Second dose administered 6/18/22 Flu vaccine received 2023    6- MGUS, following with hematologist   7- History of positive QTB  completed a course of rifampin from 09/2021- 1/1/22    8-History of HCV treatment>6 years ago ~2015- HCV RNA undetectable s.p hepatology evaluation cleared to start MTX  8- New onset of distorted vision, Now follows at [facility], diagnosed with choroidal neovascularization and started on intraocular bevacizumab (Avastin)  Plan: [] reviewed imaging completed recently discussed adding TNFi left message for Dr Esquivel to discuss [] obtain monitoring blood work today [] check ESR and CRP today [] HCV RNA negative in August  [] continue with MTX 6 tabs/week for now will reduce dose once on TNFi [] continue daily folic acid [] continue close follow up with retina specialist  RTO in 4- 8 weeks.

## 2024-10-01 ENCOUNTER — TRANSCRIPTION ENCOUNTER (OUTPATIENT)
Age: 64
End: 2024-10-01

## 2024-10-01 LAB
ALBUMIN SERPL ELPH-MCNC: 4.5 G/DL
ALP BLD-CCNC: 63 U/L
ALT SERPL-CCNC: 23 U/L
ANION GAP SERPL CALC-SCNC: 14 MMOL/L
AST SERPL-CCNC: 20 U/L
BASOPHILS # BLD AUTO: 0.04 K/UL
BASOPHILS NFR BLD AUTO: 0.6 %
BILIRUB SERPL-MCNC: 0.6 MG/DL
BUN SERPL-MCNC: 11 MG/DL
CALCIUM SERPL-MCNC: 10 MG/DL
CHLORIDE SERPL-SCNC: 103 MMOL/L
CO2 SERPL-SCNC: 26 MMOL/L
CREAT SERPL-MCNC: 0.66 MG/DL
CRP SERPL-MCNC: 4 MG/L
EGFR: 98 ML/MIN/1.73M2
EOSINOPHIL # BLD AUTO: 0.15 K/UL
EOSINOPHIL NFR BLD AUTO: 2.1 %
ERYTHROCYTE [SEDIMENTATION RATE] IN BLOOD BY WESTERGREN METHOD: 8 MM/HR
HCT VFR BLD CALC: 41 %
HGB BLD-MCNC: 13.6 G/DL
IMM GRANULOCYTES NFR BLD AUTO: 0.3 %
LYMPHOCYTES # BLD AUTO: 1.62 K/UL
LYMPHOCYTES NFR BLD AUTO: 22.7 %
MAN DIFF?: NORMAL
MCHC RBC-ENTMCNC: 31.8 PG
MCHC RBC-ENTMCNC: 33.2 GM/DL
MCV RBC AUTO: 95.8 FL
MONOCYTES # BLD AUTO: 0.62 K/UL
MONOCYTES NFR BLD AUTO: 8.7 %
NEUTROPHILS # BLD AUTO: 4.69 K/UL
NEUTROPHILS NFR BLD AUTO: 65.6 %
PLATELET # BLD AUTO: 265 K/UL
POTASSIUM SERPL-SCNC: 5.1 MMOL/L
PROT SERPL-MCNC: 6.8 G/DL
RBC # BLD: 4.28 M/UL
RBC # FLD: 13.7 %
SODIUM SERPL-SCNC: 143 MMOL/L
WBC # FLD AUTO: 7.14 K/UL

## 2024-10-03 NOTE — REASON FOR VISIT
"    DAILY PROGRESS NOTE  [facility]    Patient Identification:  Name: Marbin Hawkins  Age: 58 y.o.  Sex: male  :  1966  MRN: [id number]         Primary Care Physician: Joyce Restrepo APRN    Subjective:  Interval History: OR plan today.  No new complaints fever nausea vomiting or diarrhea    Objective: Spouse at bedside.  Lots of questions.  Patient relaxed and resting in bed    Scheduled Meds:cefepime, 2,000 mg, Intravenous, Q8H  clopidogrel, 75 mg, Oral, Daily  glipizide, 5 mg, Oral, BID AC  insulin lispro, 2-7 Units, Subcutaneous, 4x Daily AC & at Bedtime  metFORMIN, 500 mg, Oral, BID With Meals  sodium chloride, 10 mL, Intravenous, Q12H  vancomycin, 1,250 mg, Intravenous, Q12H      Continuous Infusions:Pharmacy to dose vancomycin,         Vital signs in last 24 hours:  Temp:  [97.3 °F (36.3 °C)-97.7 °F (36.5 °C)] 97.5 °F (36.4 °C)  Heart Rate:  [74-86] 74  Resp:  [18] 18  BP: (110-117)/(72-79) 110/72    Intake/Output:    Intake/Output Summary (Last 24 hours) at 10/3/2024 1046  Last data filed at 10/2/2024 1840  Gross per 24 hour   Intake 580 ml   Output --   Net 580 ml       Exam:  /72 (BP Location: Right arm, Patient Position: Lying)   Pulse 74   Temp 97.5 °F (36.4 °C) (Oral)   Resp 18   Ht 203.2 cm (80\")   Wt 121 kg (266 lb 12.1 oz)   SpO2 99%   BMI 29.30 kg/m²     General Appearance:    Alert, cooperative, nontoxic, AAOx3                         Throat:   Oral mucosa pink and moist                         Lungs:    Clear to auscultation bilaterally, respirations unlabored                          Heart:    Regular rate and rhythm, S1 and S2 normal                  Abdomen:     Soft, nontender, bowel sounds active                 Extremities: Chronic PVD style changes, great toe on right foot swollen red with ulcer                             Data Review:  Labs in chart were reviewed.    Assessment:  Active Hospital Problems    Diagnosis  POA    **Osteomyelitis of great toe " of right foot [M86.9]  Unknown    Peripheral vascular disease [I73.9]  Unknown    Atherosclerosis of native artery of extremity [I70.209]  Yes    Diabetic foot ulcer [E11.621, L97.509]  Yes    Type 2 diabetes mellitus [E11.9]  Yes      Resolved Hospital Problems   No resolved problems to display.       Plan:    MRI positive for osteomyelitis    Continue IV vancomycin and cefepime and vascular taken to the OR today for probable partial toe amputation with debridement    DM2 with circulatory disorder/PVD with previous bilateral iliac stents  With an A1c of 5.9      Disposition -patient spouse has a lot of questions regarding wound care and wound checks and will await further recommendations pending vascular   -CCP help in with discharge needs    Gato Camarillo MD  10/3/2024  10:46 EDT     [Follow-Up: _____] : a [unfilled] follow-up visit

## 2024-10-07 ENCOUNTER — RX RENEWAL (OUTPATIENT)
Age: 64
End: 2024-10-07

## 2024-12-16 NOTE — ASSESSMENT
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 No findings of TB.\par Had MRI chest 8/20/21 w/o findings of TB as well.\par \par Had to stop INH due to transaminitis and muscle aches.\par \par Continue Rifampin 600 mg po daily x 4 months.  Start date 9/1/2021.\par Can interact with omeprazole and atorvastatin. Will monitor closely. \par \par LFTs normal now. Checked 9/10/21.\par \par Discussed side effects of Rifampin with pt including but not limited to GI upset, elevated LFTs, bone marrow suppression, orange colored urine.  She does not drink alcohol.\par \par RTC 6 weeks.  She is traveling to [place] and will return to see me on her return. \par \par \par \par

## 2024-12-31 ENCOUNTER — OUTPATIENT (OUTPATIENT)
Dept: OUTPATIENT SERVICES | Facility: HOSPITAL | Age: 64
LOS: 1 days | Discharge: HOME | End: 2024-12-31

## 2024-12-31 DIAGNOSIS — D47.2 MONOCLONAL GAMMOPATHY: ICD-10-CM

## 2024-12-31 DIAGNOSIS — Z98.890 OTHER SPECIFIED POSTPROCEDURAL STATES: Chronic | ICD-10-CM

## 2024-12-31 DIAGNOSIS — G56.01 CARPAL TUNNEL SYNDROME, RIGHT UPPER LIMB: Chronic | ICD-10-CM

## 2024-12-31 DIAGNOSIS — Z98.89 OTHER SPECIFIED POSTPROCEDURAL STATES: Chronic | ICD-10-CM

## 2025-01-03 ENCOUNTER — LABORATORY RESULT (OUTPATIENT)
Age: 65
End: 2025-01-03

## 2025-01-03 DIAGNOSIS — M31.6 OTHER GIANT CELL ARTERITIS: ICD-10-CM

## 2025-01-03 DIAGNOSIS — Z86.19 PERSONAL HISTORY OF OTHER INFECTIOUS AND PARASITIC DISEASES: ICD-10-CM

## 2025-01-03 DIAGNOSIS — Z79.899 OTHER LONG TERM (CURRENT) DRUG THERAPY: ICD-10-CM

## 2025-01-05 LAB
ALBUMIN SERPL ELPH-MCNC: 4.9 G/DL
ALP BLD-CCNC: 67 U/L
ALT SERPL-CCNC: 15 U/L
ANION GAP SERPL CALC-SCNC: 14 MMOL/L
AST SERPL-CCNC: 20 U/L
BASOPHILS # BLD AUTO: 0.02 K/UL
BASOPHILS NFR BLD AUTO: 0.3 %
BILIRUB SERPL-MCNC: 0.4 MG/DL
BUN SERPL-MCNC: 17 MG/DL
CALCIUM SERPL-MCNC: 9.9 MG/DL
CHLORIDE SERPL-SCNC: 106 MMOL/L
CO2 SERPL-SCNC: 23 MMOL/L
CREAT SERPL-MCNC: 0.65 MG/DL
CRP SERPL-MCNC: 3 MG/L
EGFR: 98 ML/MIN/1.73M2
EOSINOPHIL # BLD AUTO: 0.11 K/UL
EOSINOPHIL NFR BLD AUTO: 1.8 %
ERYTHROCYTE [SEDIMENTATION RATE] IN BLOOD BY WESTERGREN METHOD: 6 MM/HR
HCT VFR BLD CALC: 41.1 %
HCV AB SER QL: REACTIVE
HCV S/CO RATIO: 14.47 S/CO
HGB BLD-MCNC: 13.2 G/DL
IMM GRANULOCYTES NFR BLD AUTO: 0.2 %
LYMPHOCYTES # BLD AUTO: 1.42 K/UL
LYMPHOCYTES NFR BLD AUTO: 22.8 %
MAN DIFF?: NORMAL
MCHC RBC-ENTMCNC: 31.4 PG
MCHC RBC-ENTMCNC: 32.1 G/DL
MCV RBC AUTO: 97.6 FL
MONOCYTES # BLD AUTO: 0.46 K/UL
MONOCYTES NFR BLD AUTO: 7.4 %
NEUTROPHILS # BLD AUTO: 4.21 K/UL
NEUTROPHILS NFR BLD AUTO: 67.5 %
PLATELET # BLD AUTO: 266 K/UL
POTASSIUM SERPL-SCNC: 4.4 MMOL/L
PROT SERPL-MCNC: 7.2 G/DL
RBC # BLD: 4.21 M/UL
RBC # FLD: 13 %
SODIUM SERPL-SCNC: 143 MMOL/L
WBC # FLD AUTO: 6.23 K/UL

## 2025-01-06 ENCOUNTER — APPOINTMENT (OUTPATIENT)
Dept: HEMATOLOGY ONCOLOGY | Facility: CLINIC | Age: 65
End: 2025-01-06
Payer: COMMERCIAL

## 2025-01-06 ENCOUNTER — RESULT REVIEW (OUTPATIENT)
Age: 65
End: 2025-01-06

## 2025-01-06 VITALS
RESPIRATION RATE: 16 BRPM | BODY MASS INDEX: 25.72 KG/M2 | OXYGEN SATURATION: 98 % | DIASTOLIC BLOOD PRESSURE: 69 MMHG | TEMPERATURE: 97.3 F | SYSTOLIC BLOOD PRESSURE: 120 MMHG | WEIGHT: 154.54 LBS | HEART RATE: 73 BPM

## 2025-01-06 DIAGNOSIS — D47.2 MONOCLONAL GAMMOPATHY: ICD-10-CM

## 2025-01-06 LAB
ALBUMIN SERPL ELPH-MCNC: 4.5 G/DL
ALP BLD-CCNC: 68 U/L
ALT SERPL-CCNC: 18 U/L
ANION GAP SERPL CALC-SCNC: 15 MMOL/L
AST SERPL-CCNC: 22 U/L
BASOPHILS # BLD AUTO: 0.02 K/UL — SIGNIFICANT CHANGE UP (ref 0–0.2)
BASOPHILS NFR BLD AUTO: 0.3 % — SIGNIFICANT CHANGE UP (ref 0–2)
BILIRUB SERPL-MCNC: 0.5 MG/DL
BUN SERPL-MCNC: 15 MG/DL
CALCIUM SERPL-MCNC: 9.9 MG/DL
CHLORIDE SERPL-SCNC: 108 MMOL/L
CO2 SERPL-SCNC: 24 MMOL/L
CREAT SERPL-MCNC: 0.6 MG/DL
EGFR: 100 ML/MIN/1.73M2
EOSINOPHIL # BLD AUTO: 0.14 K/UL — SIGNIFICANT CHANGE UP (ref 0–0.5)
EOSINOPHIL NFR BLD AUTO: 2.2 % — SIGNIFICANT CHANGE UP (ref 0–6)
GLUCOSE SERPL-MCNC: 95 MG/DL
HCT VFR BLD CALC: 40.4 % — SIGNIFICANT CHANGE UP (ref 34.5–45)
HGB BLD-MCNC: 13.5 G/DL — SIGNIFICANT CHANGE UP (ref 11.5–15.5)
IMM GRANULOCYTES NFR BLD AUTO: 0.2 % — SIGNIFICANT CHANGE UP (ref 0–0.9)
LYMPHOCYTES # BLD AUTO: 1.47 K/UL — SIGNIFICANT CHANGE UP (ref 1–3.3)
LYMPHOCYTES # BLD AUTO: 22.7 % — SIGNIFICANT CHANGE UP (ref 13–44)
MCHC RBC-ENTMCNC: 31.9 PG — SIGNIFICANT CHANGE UP (ref 27–34)
MCHC RBC-ENTMCNC: 33.4 G/DL — SIGNIFICANT CHANGE UP (ref 32–36)
MCV RBC AUTO: 95.5 FL — SIGNIFICANT CHANGE UP (ref 80–100)
MONOCYTES # BLD AUTO: 0.42 K/UL — SIGNIFICANT CHANGE UP (ref 0–0.9)
MONOCYTES NFR BLD AUTO: 6.5 % — SIGNIFICANT CHANGE UP (ref 2–14)
NEUTROPHILS # BLD AUTO: 4.41 K/UL — SIGNIFICANT CHANGE UP (ref 1.8–7.4)
NEUTROPHILS NFR BLD AUTO: 68.1 % — SIGNIFICANT CHANGE UP (ref 43–77)
NRBC # BLD: 0 /100 WBCS — SIGNIFICANT CHANGE UP (ref 0–0)
NRBC BLD-RTO: 0 /100 WBCS — SIGNIFICANT CHANGE UP (ref 0–0)
PLATELET # BLD AUTO: 285 K/UL — SIGNIFICANT CHANGE UP (ref 150–400)
POTASSIUM SERPL-SCNC: 5.1 MMOL/L
PROT SERPL-MCNC: 6.9 G/DL
RBC # BLD: 4.23 M/UL — SIGNIFICANT CHANGE UP (ref 3.8–5.2)
RBC # FLD: 12.6 % — SIGNIFICANT CHANGE UP (ref 10.3–14.5)
SODIUM SERPL-SCNC: 148 MMOL/L
WBC # BLD: 6.47 K/UL — SIGNIFICANT CHANGE UP (ref 3.8–10.5)
WBC # FLD AUTO: 6.47 K/UL — SIGNIFICANT CHANGE UP (ref 3.8–10.5)

## 2025-01-06 PROCEDURE — 99213 OFFICE O/P EST LOW 20 MIN: CPT

## 2025-01-06 PROCEDURE — G2211 COMPLEX E/M VISIT ADD ON: CPT | Mod: NC

## 2025-01-07 LAB — B2 MICROGLOB SERPL-MCNC: 2.4 MG/L

## 2025-01-10 ENCOUNTER — APPOINTMENT (OUTPATIENT)
Dept: HEART FAILURE | Facility: CLINIC | Age: 65
End: 2025-01-10

## 2025-01-12 LAB
ALBUMIN MFR SERPL ELPH: 63.5 %
ALBUMIN SERPL-MCNC: 4.4 G/DL
ALBUMIN/GLOB SERPL: 1.8 RATIO
ALPHA1 GLOB MFR SERPL ELPH: 4.2 %
ALPHA1 GLOB SERPL ELPH-MCNC: 0.3 G/DL
ALPHA2 GLOB MFR SERPL ELPH: 14.2 %
ALPHA2 GLOB SERPL ELPH-MCNC: 1 G/DL
B-GLOBULIN MFR SERPL ELPH: 10.7 %
B-GLOBULIN SERPL ELPH-MCNC: 0.7 G/DL
DEPRECATED KAPPA LC FREE/LAMBDA SER: 0.07 RATIO
GAMMA GLOB FLD ELPH-MCNC: 0.5 G/DL
GAMMA GLOB MFR SERPL ELPH: 7.4 %
IGA SER QL IEP: 64 MG/DL
IGG SER QL IEP: 539 MG/DL
IGM SER QL IEP: 19 MG/DL
INTERPRETATION SERPL IEP-IMP: NORMAL
KAPPA LC CSF-MCNC: 13.43 MG/DL
KAPPA LC SERPL-MCNC: 0.88 MG/DL
M PROTEIN SPEC IFE-MCNC: NORMAL
PROT SERPL-MCNC: 6.9 G/DL
PROT SERPL-MCNC: 6.9 G/DL

## 2025-01-14 ENCOUNTER — APPOINTMENT (OUTPATIENT)
Dept: ORTHOPEDIC SURGERY | Facility: CLINIC | Age: 65
End: 2025-01-14

## 2025-02-16 ENCOUNTER — NON-APPOINTMENT (OUTPATIENT)
Age: 65
End: 2025-02-16

## 2025-03-04 ENCOUNTER — APPOINTMENT (OUTPATIENT)
Dept: RHEUMATOLOGY | Facility: CLINIC | Age: 65
End: 2025-03-04
Payer: COMMERCIAL

## 2025-03-04 VITALS
BODY MASS INDEX: 25.49 KG/M2 | OXYGEN SATURATION: 98 % | DIASTOLIC BLOOD PRESSURE: 80 MMHG | WEIGHT: 153 LBS | HEIGHT: 65 IN | SYSTOLIC BLOOD PRESSURE: 153 MMHG | HEART RATE: 109 BPM

## 2025-03-04 DIAGNOSIS — D86.85 SARCOID MYOCARDITIS: ICD-10-CM

## 2025-03-04 DIAGNOSIS — M31.6 OTHER GIANT CELL ARTERITIS: ICD-10-CM

## 2025-03-04 DIAGNOSIS — Z79.899 OTHER LONG TERM (CURRENT) DRUG THERAPY: ICD-10-CM

## 2025-03-04 PROCEDURE — 99214 OFFICE O/P EST MOD 30 MIN: CPT

## 2025-03-04 PROCEDURE — G2211 COMPLEX E/M VISIT ADD ON: CPT | Mod: NC

## 2025-03-26 ENCOUNTER — RX RENEWAL (OUTPATIENT)
Age: 65
End: 2025-03-26

## 2025-04-06 ENCOUNTER — NON-APPOINTMENT (OUTPATIENT)
Age: 65
End: 2025-04-06

## 2025-04-06 ENCOUNTER — EMERGENCY (EMERGENCY)
Facility: HOSPITAL | Age: 65
LOS: 1 days | End: 2025-04-06
Attending: EMERGENCY MEDICINE | Admitting: EMERGENCY MEDICINE
Payer: COMMERCIAL

## 2025-04-06 VITALS
SYSTOLIC BLOOD PRESSURE: 145 MMHG | DIASTOLIC BLOOD PRESSURE: 87 MMHG | HEIGHT: 65 IN | WEIGHT: 154.98 LBS | TEMPERATURE: 98 F | HEART RATE: 91 BPM | OXYGEN SATURATION: 98 % | RESPIRATION RATE: 18 BRPM

## 2025-04-06 VITALS
OXYGEN SATURATION: 100 % | RESPIRATION RATE: 18 BRPM | SYSTOLIC BLOOD PRESSURE: 141 MMHG | HEART RATE: 79 BPM | DIASTOLIC BLOOD PRESSURE: 85 MMHG | TEMPERATURE: 98 F

## 2025-04-06 DIAGNOSIS — G56.01 CARPAL TUNNEL SYNDROME, RIGHT UPPER LIMB: Chronic | ICD-10-CM

## 2025-04-06 DIAGNOSIS — Z98.89 OTHER SPECIFIED POSTPROCEDURAL STATES: Chronic | ICD-10-CM

## 2025-04-06 DIAGNOSIS — Z98.890 OTHER SPECIFIED POSTPROCEDURAL STATES: Chronic | ICD-10-CM

## 2025-04-06 LAB
ALBUMIN SERPL ELPH-MCNC: 4.2 G/DL — SIGNIFICANT CHANGE UP (ref 3.3–5)
ALP SERPL-CCNC: 61 U/L — SIGNIFICANT CHANGE UP (ref 40–120)
ALT FLD-CCNC: 14 U/L — SIGNIFICANT CHANGE UP (ref 4–33)
ANION GAP SERPL CALC-SCNC: 14 MMOL/L — SIGNIFICANT CHANGE UP (ref 7–14)
AST SERPL-CCNC: 18 U/L — SIGNIFICANT CHANGE UP (ref 4–32)
BASOPHILS # BLD AUTO: 0.03 K/UL — SIGNIFICANT CHANGE UP (ref 0–0.2)
BASOPHILS NFR BLD AUTO: 0.5 % — SIGNIFICANT CHANGE UP (ref 0–2)
BILIRUB SERPL-MCNC: 0.5 MG/DL — SIGNIFICANT CHANGE UP (ref 0.2–1.2)
BUN SERPL-MCNC: 9 MG/DL — SIGNIFICANT CHANGE UP (ref 7–23)
CALCIUM SERPL-MCNC: 9.6 MG/DL — SIGNIFICANT CHANGE UP (ref 8.4–10.5)
CHLORIDE SERPL-SCNC: 104 MMOL/L — SIGNIFICANT CHANGE UP (ref 98–107)
CO2 SERPL-SCNC: 22 MMOL/L — SIGNIFICANT CHANGE UP (ref 22–31)
CREAT SERPL-MCNC: 0.65 MG/DL — SIGNIFICANT CHANGE UP (ref 0.5–1.3)
EGFR: 98 ML/MIN/1.73M2 — SIGNIFICANT CHANGE UP
EGFR: 98 ML/MIN/1.73M2 — SIGNIFICANT CHANGE UP
EOSINOPHIL # BLD AUTO: 0.05 K/UL — SIGNIFICANT CHANGE UP (ref 0–0.5)
EOSINOPHIL NFR BLD AUTO: 0.8 % — SIGNIFICANT CHANGE UP (ref 0–6)
GLUCOSE SERPL-MCNC: 107 MG/DL — HIGH (ref 70–99)
HCG SERPL-ACNC: 4.6 MIU/ML — SIGNIFICANT CHANGE UP
HCT VFR BLD CALC: 40.5 % — SIGNIFICANT CHANGE UP (ref 34.5–45)
HGB BLD-MCNC: 13.7 G/DL — SIGNIFICANT CHANGE UP (ref 11.5–15.5)
IANC: 4.44 K/UL — SIGNIFICANT CHANGE UP (ref 1.8–7.4)
IMM GRANULOCYTES NFR BLD AUTO: 0.2 % — SIGNIFICANT CHANGE UP (ref 0–0.9)
LYMPHOCYTES # BLD AUTO: 1.43 K/UL — SIGNIFICANT CHANGE UP (ref 1–3.3)
LYMPHOCYTES # BLD AUTO: 21.5 % — SIGNIFICANT CHANGE UP (ref 13–44)
MCHC RBC-ENTMCNC: 31.9 PG — SIGNIFICANT CHANGE UP (ref 27–34)
MCHC RBC-ENTMCNC: 33.8 G/DL — SIGNIFICANT CHANGE UP (ref 32–36)
MCV RBC AUTO: 94.2 FL — SIGNIFICANT CHANGE UP (ref 80–100)
MONOCYTES # BLD AUTO: 0.69 K/UL — SIGNIFICANT CHANGE UP (ref 0–0.9)
MONOCYTES NFR BLD AUTO: 10.4 % — SIGNIFICANT CHANGE UP (ref 2–14)
NEUTROPHILS # BLD AUTO: 4.44 K/UL — SIGNIFICANT CHANGE UP (ref 1.8–7.4)
NEUTROPHILS NFR BLD AUTO: 66.6 % — SIGNIFICANT CHANGE UP (ref 43–77)
NRBC # BLD AUTO: 0 K/UL — SIGNIFICANT CHANGE UP (ref 0–0)
NRBC # FLD: 0 K/UL — SIGNIFICANT CHANGE UP (ref 0–0)
NRBC BLD AUTO-RTO: 0 /100 WBCS — SIGNIFICANT CHANGE UP (ref 0–0)
PLATELET # BLD AUTO: 279 K/UL — SIGNIFICANT CHANGE UP (ref 150–400)
POTASSIUM SERPL-MCNC: 4.9 MMOL/L — SIGNIFICANT CHANGE UP (ref 3.5–5.3)
POTASSIUM SERPL-SCNC: 4.9 MMOL/L — SIGNIFICANT CHANGE UP (ref 3.5–5.3)
PROT SERPL-MCNC: 6.9 G/DL — SIGNIFICANT CHANGE UP (ref 6–8.3)
RBC # BLD: 4.3 M/UL — SIGNIFICANT CHANGE UP (ref 3.8–5.2)
RBC # FLD: 13.4 % — SIGNIFICANT CHANGE UP (ref 10.3–14.5)
SODIUM SERPL-SCNC: 140 MMOL/L — SIGNIFICANT CHANGE UP (ref 135–145)
WBC # BLD: 6.65 K/UL — SIGNIFICANT CHANGE UP (ref 3.8–10.5)
WBC # FLD AUTO: 6.65 K/UL — SIGNIFICANT CHANGE UP (ref 3.8–10.5)

## 2025-04-06 PROCEDURE — 70481 CT ORBIT/EAR/FOSSA W/DYE: CPT | Mod: 26

## 2025-04-06 PROCEDURE — 99285 EMERGENCY DEPT VISIT HI MDM: CPT

## 2025-04-06 RX ORDER — DOXYCYCLINE HYCLATE 100 MG
1 TABLET ORAL
Qty: 14 | Refills: 0
Start: 2025-04-06 | End: 2025-04-12

## 2025-04-06 RX ORDER — DOXYCYCLINE HYCLATE 100 MG
100 TABLET ORAL ONCE
Refills: 0 | Status: COMPLETED | OUTPATIENT
Start: 2025-04-06 | End: 2025-04-06

## 2025-04-06 RX ADMIN — Medication 100 MILLIGRAM(S): at 15:06

## 2025-04-06 NOTE — ED PROVIDER NOTE - CLINICAL SUMMARY MEDICAL DECISION MAKING FREE TEXT BOX
64 year old woman PMH sarcoidosis on MTX, s/p ablation, HTN presenting with pain and redness around surgical site after Portuguese threads face lift, induration without signs of systemic infection or discrete fluid collection, no neuro defcitis concerning for post operative complication vs infection, will obtain CT to eval.

## 2025-04-06 NOTE — ED PROVIDER NOTE - PROGRESS NOTE DETAILS
Dea ROMERO PGY1: Patient feeling well, discussed medications, return precautions, follow up, patient expressed understanding.

## 2025-04-06 NOTE — ED ADULT NURSE NOTE - OBJECTIVE STATEMENT
64 year old female, received to intake. A&Ox4, ambulatory. Respirations equal and unlabored. Hx of hepC, GERD, hyperthyroidism, KIM, genital herpes. Pt c/o L ear pain and redness to the area x4 days. Seen at urgent care and sent in for purulent drainage. Pt states she had a face lift in January of 2025 and had no complications until 4 days ago. Pt denies chest pain, SOB, palpitations, dizziness, blurry vision, n/v/d, fevers, chills. 20G IV placed to L wrist. Labs obtained. Pending CT. No acute distress noted. Safety maintained.

## 2025-04-06 NOTE — ED ADULT NURSE NOTE - CHIEF COMPLAINT QUOTE
c/o pain , swelling and purulent drainage from surgical site anterior to left ear, states had 'Ukrainian threads" procedure done in [place] 1/28/25. Some swelling and redness noted to the affected site, Phx of Hep[ C carrier.

## 2025-04-06 NOTE — ED PROVIDER NOTE - PATIENT PORTAL LINK FT
You can access the FollowMyHealth Patient Portal offered by [facility] by registering at the following website: [email or web address]. By joining [facility]’s FollowMyHealth portal, you will also be able to view your health information using other applications (apps) compatible with our system.

## 2025-04-06 NOTE — ED PROVIDER NOTE - ATTENDING CONTRIBUTION TO CARE
Agree with resident note  64-year-old female with past medical history of sarcoidosis on methotrexate, status post ablation, hypertension, works as a , is [place] born, recently had a facelift done in [place] which is called a "Yi thread" procedure as explained by patient grooves are created on both sides strings were inserted and face is lifted.  States has noticed crusting near her left ear.  States mild swelling.  Denies any fevers.  Does state has ear pain.  Physical exam  Well-appearing female in no respiratory distress  Vital signs stable, afebrile  Face mild swelling around preauricular area no tenderness at mastoid, back of states she felt postauricular, some crusting superficially, no active drainage appreciated  Impression  Will get CT to rule out abscess, patient has work at 6 PM to fly out has her follow-up appointment with surgeon in 10 days and he tripped  Will reassess after labs and CT return

## 2025-04-06 NOTE — ED ADULT TRIAGE NOTE - CHIEF COMPLAINT QUOTE
c/o pain , swelling and purulent drainage from surgical site anterior to left ear, states had 'Sinhala threads" procedure done in [place] 1/28/25. Some swelling and redness noted to the affected site, Phx of Hep[ C carrier.

## 2025-04-06 NOTE — ED PROVIDER NOTE - NSFOLLOWUPINSTRUCTIONS_ED_ALL_ED_FT
YOu were seen in the Emergency Department for face pain. Your CT scan did not show any emergencies but did show cellulitis, an infection of the skin. YOu were prescribed doxycycline 100mg twice a day for 7 days. Please follow up with your plastic surgeon. For pain you can take 1000mg of tylenol every 6 hours, no more than 4000mg in one day, and ibuprofen 400mg every 8 hours as needed.     Return to the Emergency Department if you experience severe pain not controlled with medication, spreading of the redness and pain to other parts of the face, severe headache, slurred speech, difficulty hearing, changes in balance, or numbness / weakness on one side fo the body.

## 2025-04-06 NOTE — ED PROVIDER NOTE - OBJECTIVE STATEMENT
64 year old woman PMH sarcoidosis on MTX, s/p ablation, HTN presenting with pain and redness around surgical site. at the end of January she had a Lithuan[place] threads face lift in [place], involving a thread implanted into the skin beneath the ear bilaterally. two days ago she began having pain and redness on the left side of her face around the ear with ear pain. Denies fevers, chills, changes in hearing or balance, drainiage from ear canal, ches tpain, sob, abd pain, n/v/d/c, dysuria. She called her surgeon in [place] hwo told her to come to the ED.

## 2025-04-06 NOTE — ED ADULT NURSE NOTE - NSFALLUNIVINTERV_ED_ALL_ED
Bed/Stretcher in lowest position, wheels locked, appropriate side rails in place/Call bell, personal items and telephone in reach/Instruct patient to call for assistance before getting out of bed/chair/stretcher/Non-slip footwear applied when patient is off stretcher/[place] to call system/Physically safe environment - no spills, clutter or unnecessary equipment/Purposeful proactive rounding/Room/bathroom lighting operational, light cord in reach

## 2025-04-06 NOTE — ED ADULT TRIAGE NOTE - MODE OF ARRIVAL
Problem: Potential for Falls  Goal: Patient will remain free of falls  Description: INTERVENTIONS:  - Educate patient/family on patient safety including physical limitations  - Instruct patient to call for assistance with activity   - Consult OT/PT to assist with strengthening/mobility   - Keep Call bell within reach  - Keep bed low and locked with side rails adjusted as appropriate  - Keep care items and personal belongings within reach  - Initiate and maintain comfort rounds  - Make Fall Risk Sign visible to staff  - Offer Toileting every 2 Hours, in advance of need  - Initiate/Maintain fall alarm  - Obtain necessary fall risk management equipment: non-skid footwear, call bell  - Apply yellow socks and bracelet for high fall risk patients  - Consider moving patient to room near nurses station  9/28/2023 1355 by Beatrice Reynolds RN  Outcome: Adequate for Discharge  9/28/2023 1342 by Beatrice Reynolds RN  Outcome: Adequate for Discharge  9/28/2023 0736 by Beatrice Reynolds RN  Outcome: Progressing     Problem: Nutrition/Hydration-ADULT  Goal: Nutrient/Hydration intake appropriate for improving, restoring or maintaining nutritional needs  Description: Monitor and assess patient's nutrition/hydration status for malnutrition. Collaborate with interdisciplinary team and initiate plan and interventions as ordered. Monitor patient's weight and dietary intake as ordered or per policy. Utilize nutrition screening tool and intervene as necessary. Determine patient's food preferences and provide high-protein, high-caloric foods as appropriate.      INTERVENTIONS:  - Monitor oral intake, urinary output, labs, and treatment plans  - Assess nutrition and hydration status and recommend course of action  - Evaluate amount of meals eaten  - Assist patient with eating if necessary   - Allow adequate time for meals  - Recommend/ encourage appropriate diets, oral nutritional supplements, and vitamin/mineral supplements  - Order, calculate, and assess calorie counts as needed  - Recommend, monitor, and adjust tube feedings and TPN/PPN based on assessed needs  - Assess need for intravenous fluids  - Provide specific nutrition/hydration education as appropriate  - Include patient/family/caregiver in decisions related to nutrition  9/28/2023 1355 by Hansel Kawasaki, RN  Outcome: Adequate for Discharge  9/28/2023 1342 by Hansel Kawasaki, RN  Outcome: Adequate for Discharge  9/28/2023 0736 by Hansel Kawasaki, RN  Outcome: Progressing     Problem: MOBILITY - ADULT  Goal: Maintain or return to baseline ADL function  Description: INTERVENTIONS:  -  Assess patient's ability to carry out ADLs; assess patient's baseline for ADL function and identify physical deficits which impact ability to perform ADLs (bathing, care of mouth/teeth, toileting, grooming, dressing, etc.)  - Assess/evaluate cause of self-care deficits   - Assess range of motion  - Assess patient's mobility; develop plan if impaired  - Assess patient's need for assistive devices and provide as appropriate  - Encourage maximum independence but intervene and supervise when necessary  - Involve family in performance of ADLs  - Assess for home care needs following discharge   - Consider OT consult to assist with ADL evaluation and planning for discharge  - Provide patient education as appropriate  9/28/2023 1355 by Hansel Kawasaki, RN  Outcome: Adequate for Discharge  9/28/2023 1342 by Hansel Kawasaki, RN  Outcome: Adequate for Discharge  9/28/2023 0736 by Hansel Kawasaki, RN  Outcome: Progressing  Goal: Maintains/Returns to pre admission functional level  Description: INTERVENTIONS:  - Perform BMAT or MOVE assessment daily.   - Set and communicate daily mobility goal to care team and patient/family/caregiver. - Collaborate with rehabilitation services on mobility goals if consulted  - Perform Range of Motion 3 times a day. - Reposition patient every 2 hours.   - Dangle patient 3 times a day  - Stand patient 3 times a day  - Ambulate patient 3 times a day  - Out of bed to chair 3 times a day   - Out of bed for meals 3 times a day  - Out of bed for toileting  - Record patient progress and toleration of activity level   9/28/2023 1355 by Grady King RN  Outcome: Adequate for Discharge  9/28/2023 1342 by Grady King RN  Outcome: Adequate for Discharge  9/28/2023 0736 by Grady King RN  Outcome: Progressing     Problem: Prexisting or High Potential for Compromised Skin Integrity  Goal: Skin integrity is maintained or improved  Description: INTERVENTIONS:  - Identify patients at risk for skin breakdown  - Assess and monitor skin integrity  - Assess and monitor nutrition and hydration status  - Monitor labs   - Assess for incontinence   - Turn and reposition patient  - Assist with mobility/ambulation  - Relieve pressure over bony prominences  - Avoid friction and shearing  - Provide appropriate hygiene as needed including keeping skin clean and dry  - Evaluate need for skin moisturizer/barrier cream  - Collaborate with interdisciplinary team   - Patient/family teaching  - Consider wound care consult   9/28/2023 1355 by Grady King RN  Outcome: Adequate for Discharge  9/28/2023 1342 by Grady King RN  Outcome: Adequate for Discharge  9/28/2023 0736 by Grady King RN  Outcome: Progressing     Problem: PAIN - ADULT  Goal: Verbalizes/displays adequate comfort level or baseline comfort level  Description: Interventions:  - Encourage patient to monitor pain and request assistance  - Assess pain using appropriate pain scale  - Administer analgesics based on type and severity of pain and evaluate response  - Implement non-pharmacological measures as appropriate and evaluate response  - Consider cultural and social influences on pain and pain management  - Notify physician/advanced practitioner if interventions unsuccessful or patient reports new pain  9/28/2023 1355 by Grady King RN  Outcome: Adequate for Discharge  9/28/2023 1342 by Roderick Young RN  Outcome: Adequate for Discharge  9/28/2023 3893 by Roderick Young RN  Outcome: Progressing     Problem: INFECTION - ADULT  Goal: Absence or prevention of progression during hospitalization  Description: INTERVENTIONS:  - Assess and monitor for signs and symptoms of infection  - Monitor lab/diagnostic results  - Monitor all insertion sites, i.e. indwelling lines, tubes, and drains  - Monitor endotracheal if appropriate and nasal secretions for changes in amount and color  - Jakin appropriate cooling/warming therapies per order  - Administer medications as ordered  - Instruct and encourage patient and family to use good hand hygiene technique  - Identify and instruct in appropriate isolation precautions for identified infection/condition  9/28/2023 1355 by Roderick Young RN  Outcome: Adequate for Discharge  9/28/2023 1342 by Roderick Young RN  Outcome: Adequate for Discharge  9/28/2023 0736 by Roderick Young RN  Outcome: Progressing  Goal: Absence of fever/infection during neutropenic period  Description: INTERVENTIONS:  - Monitor WBC    9/28/2023 1355 by Roderick Young RN  Outcome: Adequate for Discharge  9/28/2023 1342 by Roderick Young RN  Outcome: Adequate for Discharge  9/28/2023 0736 by Roderick Young RN  Outcome: Progressing     Problem: SAFETY ADULT  Goal: Patient will remain free of falls  Description: INTERVENTIONS:  - Educate patient/family on patient safety including physical limitations  - Instruct patient to call for assistance with activity   - Consult OT/PT to assist with strengthening/mobility   - Keep Call bell within reach  - Keep bed low and locked with side rails adjusted as appropriate  - Keep care items and personal belongings within reach  - Initiate and maintain comfort rounds  - Make Fall Risk Sign visible to staff  - Offer Toileting every 2 Hours, in advance of need  - Initiate/Maintain fall alarm  - Obtain necessary fall risk management equipment: non-skid footwear, call bell  - Apply yellow socks and bracelet for high fall risk patients  - Consider moving patient to room near nurses station  9/28/2023 1355 by Dinorah Le RN  Outcome: Adequate for Discharge  9/28/2023 1342 by Dinorah Le RN  Outcome: Adequate for Discharge  9/28/2023 0736 by Dinorah Le RN  Outcome: Progressing  Goal: Maintain or return to baseline ADL function  Description: INTERVENTIONS:  -  Assess patient's ability to carry out ADLs; assess patient's baseline for ADL function and identify physical deficits which impact ability to perform ADLs (bathing, care of mouth/teeth, toileting, grooming, dressing, etc.)  - Assess/evaluate cause of self-care deficits   - Assess range of motion  - Assess patient's mobility; develop plan if impaired  - Assess patient's need for assistive devices and provide as appropriate  - Encourage maximum independence but intervene and supervise when necessary  - Involve family in performance of ADLs  - Assess for home care needs following discharge   - Consider OT consult to assist with ADL evaluation and planning for discharge  - Provide patient education as appropriate  9/28/2023 1355 by Dinorah Le RN  Outcome: Adequate for Discharge  9/28/2023 1342 by Dinorah Le RN  Outcome: Adequate for Discharge  9/28/2023 0736 by Dinorah Le RN  Outcome: Progressing  Goal: Maintains/Returns to pre admission functional level  Description: INTERVENTIONS:  - Perform BMAT or MOVE assessment daily.   - Set and communicate daily mobility goal to care team and patient/family/caregiver. - Collaborate with rehabilitation services on mobility goals if consulted  - Perform Range of Motion 3 times a day. - Reposition patient every 2 hours.   - Dangle patient 3 times a day  - Stand patient 3 times a day  - Ambulate patient 3 times a day  - Out of bed to chair 3 times a day   - Out of bed for meals 3 times a day  - Out of bed for toileting  - Record patient progress and toleration of activity level   9/28/2023 1355 by Meseret Cervantes RN  Outcome: Adequate for Discharge  9/28/2023 1342 by Meseret Cervantes RN  Outcome: Adequate for Discharge  9/28/2023 0736 by Meseret Cervantes RN  Outcome: Progressing     Problem: DISCHARGE PLANNING  Goal: Discharge to home or other facility with appropriate resources  Description: INTERVENTIONS:  - Identify barriers to discharge w/patient and caregiver  - Arrange for needed discharge resources and transportation as appropriate  - Identify discharge learning needs (meds, wound care, etc.)  - Arrange for interpretive services to assist at discharge as needed  - Refer to Case Management Department for coordinating discharge planning if the patient needs post-hospital services based on physician/advanced practitioner order or complex needs related to functional status, cognitive ability, or social support system  9/28/2023 1355 by Meseret Cervantes RN  Outcome: Adequate for Discharge  9/28/2023 1342 by Meseret Cervantes RN  Outcome: Adequate for Discharge  9/28/2023 0736 by Meseret Cervantes RN  Outcome: Progressing     Problem: Knowledge Deficit  Goal: Patient/family/caregiver demonstrates understanding of disease process, treatment plan, medications, and discharge instructions  Description: Complete learning assessment and assess knowledge base.   Interventions:  - Provide teaching at level of understanding  - Provide teaching via preferred learning methods  9/28/2023 1355 by Meseret Cervantes RN  Outcome: Adequate for Discharge  9/28/2023 1342 by Meseret Cervantes RN  Outcome: Adequate for Discharge  9/28/2023 0736 by Meseret Cervantes RN  Outcome: Progressing     Problem: CARDIOVASCULAR - ADULT  Goal: Maintains optimal cardiac output and hemodynamic stability  Description: INTERVENTIONS:  - Monitor I/O, vital signs and rhythm  - Monitor for S/S and trends of decreased cardiac output  - Administer and titrate ordered vasoactive medications to optimize hemodynamic stability  - Assess quality of pulses, skin color and temperature  - Assess for signs of decreased coronary artery perfusion  - Instruct patient to report change in severity of symptoms  9/28/2023 1355 by Dinorah Le RN  Outcome: Adequate for Discharge  9/28/2023 1342 by Dinorah Le RN  Outcome: Adequate for Discharge  9/28/2023 0736 by Dinorah Le RN  Outcome: Progressing  Goal: Absence of cardiac dysrhythmias or at baseline rhythm  Description: INTERVENTIONS:  - Continuous cardiac monitoring, vital signs, obtain 12 lead EKG if ordered  - Administer antiarrhythmic and heart rate control medications as ordered  - Monitor electrolytes and administer replacement therapy as ordered  9/28/2023 1355 by Dinorah Le RN  Outcome: Adequate for Discharge  9/28/2023 1342 by Dinorah Le RN  Outcome: Adequate for Discharge  9/28/2023 0736 by Dinorah Le RN  Outcome: Progressing     Problem: GASTROINTESTINAL - ADULT  Goal: Minimal or absence of nausea and/or vomiting  Description: INTERVENTIONS:  - Administer IV fluids if ordered to ensure adequate hydration  - Maintain NPO status until nausea and vomiting are resolved  - Nasogastric tube if ordered  - Administer ordered antiemetic medications as needed  - Provide nonpharmacologic comfort measures as appropriate  - Advance diet as tolerated, if ordered  - Consider nutrition services referral to assist patient with adequate nutrition and appropriate food choices  9/28/2023 1355 by Dinorah Le RN  Outcome: Adequate for Discharge  9/28/2023 1342 by Dinorah Le RN  Outcome: Adequate for Discharge  9/28/2023 0736 by Dinorah Le RN  Outcome: Progressing  Goal: Maintains or returns to baseline bowel function  Description: INTERVENTIONS:  - Assess bowel function  - Encourage oral fluids to ensure adequate hydration  - Administer IV fluids if ordered to ensure adequate hydration  - Administer ordered medications as needed  - Encourage mobilization and activity  - Consider nutritional services referral to assist patient with adequate nutrition and appropriate food choices  9/28/2023 1355 by Zena Tate RN  Outcome: Adequate for Discharge  9/28/2023 1342 by Zena Tate RN  Outcome: Adequate for Discharge  9/28/2023 0736 by Zena Tate RN  Outcome: Progressing  Goal: Maintains adequate nutritional intake  Description: INTERVENTIONS:  - Monitor percentage of each meal consumed  - Identify factors contributing to decreased intake, treat as appropriate  - Assist with meals as needed  - Monitor I&O, weight, and lab values if indicated  - Obtain nutrition services referral as needed  9/28/2023 1355 by Zena Tate RN  Outcome: Adequate for Discharge  9/28/2023 1342 by Zena Tate RN  Outcome: Adequate for Discharge  9/28/2023 0736 by Zena Tate RN  Outcome: Progressing  Goal: Oral mucous membranes remain intact  Description: INTERVENTIONS  - Assess oral mucosa and hygiene practices  - Implement preventative oral hygiene regimen  - Implement oral medicated treatments as ordered  - Initiate Nutrition services referral as needed  9/28/2023 1355 by Zena Tate RN  Outcome: Adequate for Discharge  9/28/2023 1342 by Zena Tate RN  Outcome: Adequate for Discharge  9/28/2023 0736 by Zena Tate RN  Outcome: Progressing     Problem: METABOLIC, FLUID AND ELECTROLYTES - ADULT  Goal: Electrolytes maintained within normal limits  Description: INTERVENTIONS:  - Monitor labs and assess patient for signs and symptoms of electrolyte imbalances  - Administer electrolyte replacement as ordered  - Monitor response to electrolyte replacements, including repeat lab results as appropriate  - Instruct patient on fluid and nutrition as appropriate  9/28/2023 1355 by Zena Tate RN  Outcome: Adequate for Discharge  9/28/2023 1342 by Zena Tate RN  Outcome: Adequate for Discharge  9/28/2023 0736 by Zena Tate RN  Outcome: Progressing  Goal: Fluid balance maintained  Description: INTERVENTIONS:  - Monitor labs   - Monitor I/O and WT  - Instruct patient on fluid and nutrition as appropriate  - Assess for signs & symptoms of volume excess or deficit  9/28/2023 1355 by Angel Armendariz RN  Outcome: Adequate for Discharge  9/28/2023 1342 by Angel Armendariz RN  Outcome: Adequate for Discharge  9/28/2023 0736 by Angel Armendariz RN  Outcome: Progressing  Goal: Glucose maintained within target range  Description: INTERVENTIONS:  - Monitor Blood Glucose as ordered  - Assess for signs and symptoms of hyperglycemia and hypoglycemia  - Administer ordered medications to maintain glucose within target range  - Assess nutritional intake and initiate nutrition service referral as needed  9/28/2023 1355 by Angel Armendariz RN  Outcome: Adequate for Discharge  9/28/2023 1342 by Angel Armendariz RN  Outcome: Adequate for Discharge  9/28/2023 0736 by Angel Armendariz RN  Outcome: Progressing     Problem: HEMATOLOGIC - ADULT  Goal: Maintains hematologic stability  Description: INTERVENTIONS  - Assess for signs and symptoms of bleeding or hemorrhage  - Monitor labs  - Administer supportive blood products/factors as ordered and appropriate  9/28/2023 1355 by Angel Armendariz RN  Outcome: Adequate for Discharge  9/28/2023 1342 by Angel Armendariz RN  Outcome: Adequate for Discharge  9/28/2023 0736 by Angel Armendariz RN  Outcome: Progressing     Problem: MUSCULOSKELETAL - ADULT  Goal: Maintain or return mobility to safest level of function  Description: INTERVENTIONS:  - Assess patient's ability to carry out ADLs; assess patient's baseline for ADL function and identify physical deficits which impact ability to perform ADLs (bathing, care of mouth/teeth, toileting, grooming, dressing, etc.)  - Assess/evaluate cause of self-care deficits   - Assess range of motion  - Assess patient's mobility  - Assess patient's need for assistive devices and provide as appropriate  - Encourage maximum independence but intervene and supervise when necessary  - Involve family in performance of ADLs  - Assess for home care needs following discharge   - Consider OT consult to assist with ADL evaluation and planning for discharge  - Provide patient education as appropriate  9/28/2023 1355 by Dora Regan RN  Outcome: Adequate for Discharge  9/28/2023 1342 by Dora Regan RN  Outcome: Adequate for Discharge  9/28/2023 0736 by Dora Regan RN  Outcome: Progressing  Goal: Maintain proper alignment of affected body part  Description: INTERVENTIONS:  - Support, maintain and protect limb and body alignment  - Provide patient/ family with appropriate education  9/28/2023 1355 by Dora Regan RN  Outcome: Adequate for Discharge  9/28/2023 1342 by Dora Regan RN  Outcome: Adequate for Discharge  9/28/2023 0736 by Dora Regan RN  Outcome: Progressing Walk in

## 2025-04-06 NOTE — ED PROVIDER NOTE - PHYSICAL EXAMINATION
Physical Exam:  Gen: no acute distress, AOx3, nontoxic appearing, able to ambulate without assistance  Head: NCAT  HEENT: EOMI, PEERLA, normal conjunctiva, tongue midline, oral mucosa moist, ear canal and tympanic membranes normal bilaterally. redness and induration inferior to ear lob posterior and anterior at surtgical site.   Lung: CTAB, no respiratory distress, no wheezes/rhonchi/rales B/L, speaking in full sentences  CV: RRR, no murmurs, rubs or gallops  Abd: soft, NT, ND, no guarding, no rigidity, no rebound tenderness, no CVA tenderness  MSK: no visible deformities, ROM normal in UE/LE, no neck / back pain, calf tenderness  Neuro: No focal sensory or motor deficits in cranial nerves, upper and lower extremities  Skin: Warm, well perfused, no rash, no leg swelling

## 2025-04-09 NOTE — ED ADULT TRIAGE NOTE - BP NONINVASIVE SYSTOLIC (MM HG)
Hospitalized or in ED since last visit: No    Medication Changes (Include start / end dates and DDI info): Yes 4/5/25 prescribed Ceftin 500 mg  BID x 10 days (4-5-4/15) for LRTI     Upcoming/Recent Procedures: No    Referral / AAC Agreement  Expiration Date Approaching: Yes Due 6/6/25    BP Readings from Last 1 Encounters:   04/05/25 (!) 148/82       105

## 2025-04-10 ENCOUNTER — RX RENEWAL (OUTPATIENT)
Age: 65
End: 2025-04-10

## 2025-04-16 ENCOUNTER — RX RENEWAL (OUTPATIENT)
Age: 65
End: 2025-04-16

## 2025-05-11 NOTE — END OF VISIT
[] : Fellow [Time Spent: ___ minutes] : I have spent [unfilled] minutes of time on the encounter. Attending with

## 2025-06-16 ENCOUNTER — APPOINTMENT (OUTPATIENT)
Dept: RHEUMATOLOGY | Facility: CLINIC | Age: 65
End: 2025-06-16
Payer: MEDICARE

## 2025-06-16 VITALS
BODY MASS INDEX: 25.66 KG/M2 | HEART RATE: 87 BPM | SYSTOLIC BLOOD PRESSURE: 124 MMHG | HEIGHT: 65 IN | DIASTOLIC BLOOD PRESSURE: 73 MMHG | RESPIRATION RATE: 16 BRPM | WEIGHT: 154 LBS | OXYGEN SATURATION: 98 %

## 2025-06-16 PROCEDURE — G2211 COMPLEX E/M VISIT ADD ON: CPT

## 2025-06-16 PROCEDURE — 99214 OFFICE O/P EST MOD 30 MIN: CPT

## 2025-06-19 ENCOUNTER — TRANSCRIPTION ENCOUNTER (OUTPATIENT)
Age: 65
End: 2025-06-19

## 2025-06-23 ENCOUNTER — TRANSCRIPTION ENCOUNTER (OUTPATIENT)
Age: 65
End: 2025-06-23

## 2025-06-24 LAB
ALBUMIN SERPL ELPH-MCNC: 4.5 G/DL
ALP BLD-CCNC: 80 U/L
ALT SERPL-CCNC: 57 U/L
ANION GAP SERPL CALC-SCNC: 14 MMOL/L
AST SERPL-CCNC: 39 U/L
BASOPHILS # BLD AUTO: 0.04 K/UL
BASOPHILS NFR BLD AUTO: 0.6 %
BILIRUB SERPL-MCNC: 0.2 MG/DL
BUN SERPL-MCNC: 18 MG/DL
CALCIUM SERPL-MCNC: 10.2 MG/DL
CHLORIDE SERPL-SCNC: 106 MMOL/L
CO2 SERPL-SCNC: 23 MMOL/L
CREAT SERPL-MCNC: 0.72 MG/DL
CRP SERPL-MCNC: <3 MG/L
EGFRCR SERPLBLD CKD-EPI 2021: 93 ML/MIN/1.73M2
EOSINOPHIL # BLD AUTO: 0.14 K/UL
EOSINOPHIL NFR BLD AUTO: 2 %
ERYTHROCYTE [SEDIMENTATION RATE] IN BLOOD BY WESTERGREN METHOD: 4 MM/HR
HBV CORE IGG+IGM SER QL: NONREACTIVE
HBV SURFACE AG SER QL: NONREACTIVE
HCT VFR BLD CALC: 36.9 %
HCV RNA SERPL NAA DL=5-ACNC: NOT DETECTED IU/ML
HCV RNA SERPL NAA+PROBE-LOG IU: NOT DETECTED LOGIU/ML
HEPC RNA INTERP: NOT DETECTED
HGB BLD-MCNC: 11.9 G/DL
IMM GRANULOCYTES NFR BLD AUTO: 0.1 %
LYMPHOCYTES # BLD AUTO: 1.41 K/UL
LYMPHOCYTES NFR BLD AUTO: 19.7 %
MAN DIFF?: NORMAL
MCHC RBC-ENTMCNC: 31.8 PG
MCHC RBC-ENTMCNC: 32.2 G/DL
MCV RBC AUTO: 98.7 FL
MONOCYTES # BLD AUTO: 0.55 K/UL
MONOCYTES NFR BLD AUTO: 7.7 %
NEUTROPHILS # BLD AUTO: 5.01 K/UL
NEUTROPHILS NFR BLD AUTO: 69.9 %
PLATELET # BLD AUTO: 292 K/UL
POTASSIUM SERPL-SCNC: 4.8 MMOL/L
PROT SERPL-MCNC: 6.5 G/DL
RBC # BLD: 3.74 M/UL
RBC # FLD: 13.7 %
SODIUM SERPL-SCNC: 143 MMOL/L
WBC # FLD AUTO: 7.16 K/UL

## 2025-07-03 ENCOUNTER — OUTPATIENT (OUTPATIENT)
Dept: OUTPATIENT SERVICES | Facility: HOSPITAL | Age: 65
LOS: 1 days | Discharge: HOME | End: 2025-07-03

## 2025-07-03 ENCOUNTER — TRANSCRIPTION ENCOUNTER (OUTPATIENT)
Age: 65
End: 2025-07-03

## 2025-07-03 DIAGNOSIS — Z98.89 OTHER SPECIFIED POSTPROCEDURAL STATES: Chronic | ICD-10-CM

## 2025-07-03 DIAGNOSIS — Z98.890 OTHER SPECIFIED POSTPROCEDURAL STATES: Chronic | ICD-10-CM

## 2025-07-03 DIAGNOSIS — G56.01 CARPAL TUNNEL SYNDROME, RIGHT UPPER LIMB: Chronic | ICD-10-CM

## 2025-07-03 DIAGNOSIS — D47.2 MONOCLONAL GAMMOPATHY: ICD-10-CM

## 2025-07-03 LAB
ALBUMIN SERPL ELPH-MCNC: 4.6 G/DL
ALP BLD-CCNC: 74 U/L
ALT SERPL-CCNC: 33 U/L
AST SERPL-CCNC: 29 U/L
BILIRUB DIRECT SERPL-MCNC: 0.19 MG/DL
BILIRUB INDIRECT SERPL-MCNC: 0.3 MG/DL
BILIRUB SERPL-MCNC: 0.5 MG/DL
PROT SERPL-MCNC: 6.7 G/DL

## 2025-07-07 ENCOUNTER — RESULT REVIEW (OUTPATIENT)
Age: 65
End: 2025-07-07

## 2025-07-07 ENCOUNTER — APPOINTMENT (OUTPATIENT)
Dept: HEMATOLOGY ONCOLOGY | Facility: CLINIC | Age: 65
End: 2025-07-07
Payer: MEDICARE

## 2025-07-07 VITALS
RESPIRATION RATE: 16 BRPM | OXYGEN SATURATION: 98 % | HEART RATE: 73 BPM | SYSTOLIC BLOOD PRESSURE: 129 MMHG | BODY MASS INDEX: 25.63 KG/M2 | TEMPERATURE: 97.2 F | DIASTOLIC BLOOD PRESSURE: 80 MMHG | WEIGHT: 153.99 LBS

## 2025-07-07 PROBLEM — D47.2 MONOCLONAL GAMMOPATHY: Status: ACTIVE | Noted: 2021-04-21

## 2025-07-07 LAB
ALBUMIN SERPL ELPH-MCNC: 4.6 G/DL
ALP BLD-CCNC: 74 U/L
ALT SERPL-CCNC: 23 U/L
ANION GAP SERPL CALC-SCNC: 13 MMOL/L
AST SERPL-CCNC: 26 U/L
B2 MICROGLOB SERPL-MCNC: 2.7 MG/L
BASOPHILS # BLD AUTO: 0.02 K/UL — SIGNIFICANT CHANGE UP (ref 0–0.2)
BASOPHILS NFR BLD AUTO: 0.3 % — SIGNIFICANT CHANGE UP (ref 0–2)
BILIRUB SERPL-MCNC: 0.5 MG/DL
BUN SERPL-MCNC: 11 MG/DL
CALCIUM SERPL-MCNC: 9.7 MG/DL
CHLORIDE SERPL-SCNC: 106 MMOL/L
CO2 SERPL-SCNC: 24 MMOL/L
CREAT SERPL-MCNC: 0.63 MG/DL
EGFRCR SERPLBLD CKD-EPI 2021: 98 ML/MIN/1.73M2
EOSINOPHIL # BLD AUTO: 0.12 K/UL — SIGNIFICANT CHANGE UP (ref 0–0.5)
EOSINOPHIL NFR BLD AUTO: 1.7 % — SIGNIFICANT CHANGE UP (ref 0–6)
GLUCOSE SERPL-MCNC: 100 MG/DL
HCT VFR BLD CALC: 39.8 % — SIGNIFICANT CHANGE UP (ref 34.5–45)
HGB BLD-MCNC: 13.5 G/DL — SIGNIFICANT CHANGE UP (ref 11.5–15.5)
IMM GRANULOCYTES NFR BLD AUTO: 0.3 % — SIGNIFICANT CHANGE UP (ref 0–0.9)
LDH SERPL-CCNC: 227 U/L
LYMPHOCYTES # BLD AUTO: 1.53 K/UL — SIGNIFICANT CHANGE UP (ref 1–3.3)
LYMPHOCYTES # BLD AUTO: 21.5 % — SIGNIFICANT CHANGE UP (ref 13–44)
MCHC RBC-ENTMCNC: 31.8 PG — SIGNIFICANT CHANGE UP (ref 27–34)
MCHC RBC-ENTMCNC: 33.9 G/DL — SIGNIFICANT CHANGE UP (ref 32–36)
MCV RBC AUTO: 93.6 FL — SIGNIFICANT CHANGE UP (ref 80–100)
MONOCYTES # BLD AUTO: 0.55 K/UL — SIGNIFICANT CHANGE UP (ref 0–0.9)
MONOCYTES NFR BLD AUTO: 7.7 % — SIGNIFICANT CHANGE UP (ref 2–14)
NEUTROPHILS # BLD AUTO: 4.87 K/UL — SIGNIFICANT CHANGE UP (ref 1.8–7.4)
NEUTROPHILS NFR BLD AUTO: 68.5 % — SIGNIFICANT CHANGE UP (ref 43–77)
NRBC BLD AUTO-RTO: 0 /100 WBCS — SIGNIFICANT CHANGE UP (ref 0–0)
PLATELET # BLD AUTO: 264 K/UL — SIGNIFICANT CHANGE UP (ref 150–400)
POTASSIUM SERPL-SCNC: 4.8 MMOL/L
PROT SERPL-MCNC: 6.6 G/DL
RBC # BLD: 4.25 M/UL — SIGNIFICANT CHANGE UP (ref 3.8–5.2)
RBC # FLD: 13.2 % — SIGNIFICANT CHANGE UP (ref 10.3–14.5)
SODIUM SERPL-SCNC: 144 MMOL/L
WBC # BLD: 7.11 K/UL — SIGNIFICANT CHANGE UP (ref 3.8–10.5)
WBC # FLD AUTO: 7.11 K/UL — SIGNIFICANT CHANGE UP (ref 3.8–10.5)

## 2025-07-07 PROCEDURE — 99203 OFFICE O/P NEW LOW 30 MIN: CPT

## 2025-07-07 PROCEDURE — G2211 COMPLEX E/M VISIT ADD ON: CPT

## 2025-07-11 LAB
ALBUMIN MFR SERPL ELPH: 64.1 %
ALBUMIN SERPL-MCNC: 4.2 G/DL
ALBUMIN/GLOB SERPL: 1.8 RATIO
ALPHA1 GLOB MFR SERPL ELPH: 4.5 %
ALPHA1 GLOB SERPL ELPH-MCNC: 0.3 G/DL
ALPHA2 GLOB MFR SERPL ELPH: 14.2 %
ALPHA2 GLOB SERPL ELPH-MCNC: 0.9 G/DL
B-GLOBULIN MFR SERPL ELPH: 10.6 %
B-GLOBULIN SERPL ELPH-MCNC: 0.7 G/DL
DEPRECATED KAPPA LC FREE/LAMBDA SER: 0.08 RATIO
GAMMA GLOB FLD ELPH-MCNC: 0.4 G/DL
GAMMA GLOB MFR SERPL ELPH: 6.6 %
IGA SERPL-MCNC: 60 MG/DL
IGG SERPL-MCNC: 451 MG/DL
IGM SERPL-MCNC: 22 MG/DL
INTERPRETATION SERPL IEP-IMP: NORMAL
KAPPA LC CSF-MCNC: 13.07 MG/DL
KAPPA LC SERPL-MCNC: 1 MG/DL
M PROTEIN MFR SERPL ELPH: NORMAL
M PROTEIN SPEC IFE-MCNC: NORMAL
MONOCLON BAND OBS SERPL: NORMAL
PROT SERPL-MCNC: 6.6 G/DL
PROT SERPL-MCNC: 6.6 G/DL

## 2025-07-29 ENCOUNTER — APPOINTMENT (OUTPATIENT)
Dept: NUCLEAR MEDICINE | Facility: IMAGING CENTER | Age: 65
End: 2025-07-29
Payer: COMMERCIAL

## 2025-07-29 ENCOUNTER — RESULT REVIEW (OUTPATIENT)
Age: 65
End: 2025-07-29

## 2025-07-29 ENCOUNTER — OUTPATIENT (OUTPATIENT)
Dept: OUTPATIENT SERVICES | Facility: HOSPITAL | Age: 65
LOS: 1 days | End: 2025-07-29
Payer: COMMERCIAL

## 2025-07-29 DIAGNOSIS — Z98.89 OTHER SPECIFIED POSTPROCEDURAL STATES: Chronic | ICD-10-CM

## 2025-07-29 DIAGNOSIS — G56.01 CARPAL TUNNEL SYNDROME, RIGHT UPPER LIMB: Chronic | ICD-10-CM

## 2025-07-29 DIAGNOSIS — Z98.890 OTHER SPECIFIED POSTPROCEDURAL STATES: Chronic | ICD-10-CM

## 2025-07-29 DIAGNOSIS — Z00.8 ENCOUNTER FOR OTHER GENERAL EXAMINATION: ICD-10-CM

## 2025-07-29 PROCEDURE — 78451 HT MUSCLE IMAGE SPECT SING: CPT | Mod: 26

## 2025-07-29 PROCEDURE — 78429 MYOCRD IMG PET 1 STD W/CT: CPT

## 2025-07-29 PROCEDURE — A9500: CPT

## 2025-07-29 PROCEDURE — A9552: CPT

## 2025-07-29 PROCEDURE — 78816 PET IMAGE W/CT FULL BODY: CPT

## 2025-07-29 PROCEDURE — 78429 MYOCRD IMG PET 1 STD W/CT: CPT | Mod: 26

## 2025-07-29 PROCEDURE — 78816 PET IMAGE W/CT FULL BODY: CPT | Mod: 26

## 2025-07-29 PROCEDURE — 78451 HT MUSCLE IMAGE SPECT SING: CPT

## 2025-07-31 ENCOUNTER — TRANSCRIPTION ENCOUNTER (OUTPATIENT)
Age: 65
End: 2025-07-31

## 2025-08-01 ENCOUNTER — TRANSCRIPTION ENCOUNTER (OUTPATIENT)
Age: 65
End: 2025-08-01

## 2025-08-06 ENCOUNTER — APPOINTMENT (OUTPATIENT)
Dept: RHEUMATOLOGY | Facility: CLINIC | Age: 65
End: 2025-08-06
Payer: MEDICARE

## 2025-08-06 DIAGNOSIS — D86.85 SARCOID MYOCARDITIS: ICD-10-CM

## 2025-08-06 DIAGNOSIS — M31.6 OTHER GIANT CELL ARTERITIS: ICD-10-CM

## 2025-08-06 PROCEDURE — 99212 OFFICE O/P EST SF 10 MIN: CPT | Mod: 93

## 2025-08-12 ENCOUNTER — TRANSCRIPTION ENCOUNTER (OUTPATIENT)
Age: 65
End: 2025-08-12

## 2025-08-12 DIAGNOSIS — D86.9 SARCOIDOSIS, UNSPECIFIED: ICD-10-CM

## 2025-08-12 DIAGNOSIS — R91.8 OTHER NONSPECIFIC ABNORMAL FINDING OF LUNG FIELD: ICD-10-CM

## 2025-08-25 ENCOUNTER — TRANSCRIPTION ENCOUNTER (OUTPATIENT)
Age: 65
End: 2025-08-25

## 2025-09-16 ENCOUNTER — RX RENEWAL (OUTPATIENT)
Age: 65
End: 2025-09-16

## 2025-09-17 ENCOUNTER — APPOINTMENT (OUTPATIENT)
Dept: CT IMAGING | Facility: IMAGING CENTER | Age: 65
End: 2025-09-17
Payer: MEDICARE

## 2025-09-17 PROCEDURE — 71250 CT THORAX DX C-: CPT | Mod: 26

## 2025-09-19 ENCOUNTER — TRANSCRIPTION ENCOUNTER (OUTPATIENT)
Age: 65
End: 2025-09-19